# Patient Record
Sex: MALE | Race: OTHER | Employment: OTHER | URBAN - METROPOLITAN AREA
[De-identification: names, ages, dates, MRNs, and addresses within clinical notes are randomized per-mention and may not be internally consistent; named-entity substitution may affect disease eponyms.]

---

## 2017-02-15 ENCOUNTER — LAB ENCOUNTER (OUTPATIENT)
Dept: LAB | Facility: HOSPITAL | Age: 82
End: 2017-02-15
Attending: INTERNAL MEDICINE
Payer: MEDICARE

## 2017-02-15 DIAGNOSIS — I49.9 IRREGULAR HEART BEAT: Primary | ICD-10-CM

## 2017-02-15 PROCEDURE — 93010 ELECTROCARDIOGRAM REPORT: CPT | Performed by: INTERNAL MEDICINE

## 2017-02-15 PROCEDURE — 93005 ELECTROCARDIOGRAM TRACING: CPT

## 2017-03-02 ENCOUNTER — LAB ENCOUNTER (OUTPATIENT)
Dept: LAB | Facility: HOSPITAL | Age: 82
End: 2017-03-02
Attending: INTERNAL MEDICINE
Payer: MEDICARE

## 2017-03-02 ENCOUNTER — PRIOR ORIGINAL RECORDS (OUTPATIENT)
Dept: OTHER | Age: 82
End: 2017-03-02

## 2017-03-02 DIAGNOSIS — N39.0 UTI (URINARY TRACT INFECTION): ICD-10-CM

## 2017-03-02 DIAGNOSIS — I10 HYPERTENSION: ICD-10-CM

## 2017-03-02 DIAGNOSIS — E78.5 HYPERLIPIDEMIA: Primary | ICD-10-CM

## 2017-03-02 DIAGNOSIS — N40.0 PROSTATE ENLARGEMENT: ICD-10-CM

## 2017-03-02 LAB
ALBUMIN SERPL BCP-MCNC: 3.6 G/DL (ref 3.5–4.8)
ALBUMIN/GLOB SERPL: 1.2 {RATIO} (ref 1–2)
ALP SERPL-CCNC: 52 U/L (ref 32–100)
ALT SERPL-CCNC: 18 U/L (ref 17–63)
ANION GAP SERPL CALC-SCNC: 8 MMOL/L (ref 0–18)
AST SERPL-CCNC: 20 U/L (ref 15–41)
BILIRUB SERPL-MCNC: 1.1 MG/DL (ref 0.3–1.2)
BILIRUB UR QL: NEGATIVE
BUN SERPL-MCNC: 10 MG/DL (ref 8–20)
BUN/CREAT SERPL: 9.3 (ref 10–20)
CALCIUM SERPL-MCNC: 9.3 MG/DL (ref 8.5–10.5)
CHLORIDE SERPL-SCNC: 104 MMOL/L (ref 95–110)
CHOLEST SERPL-MCNC: 158 MG/DL (ref 110–200)
CLARITY UR: CLEAR
CO2 SERPL-SCNC: 29 MMOL/L (ref 22–32)
COLOR UR: YELLOW
CREAT SERPL-MCNC: 1.08 MG/DL (ref 0.5–1.5)
GLOBULIN PLAS-MCNC: 2.9 G/DL (ref 2.5–3.7)
GLUCOSE SERPL-MCNC: 92 MG/DL (ref 70–99)
GLUCOSE UR-MCNC: NEGATIVE MG/DL
HDLC SERPL-MCNC: 44 MG/DL
HGB UR QL STRIP.AUTO: NEGATIVE
KETONES UR-MCNC: NEGATIVE MG/DL
LDLC SERPL CALC-MCNC: 74 MG/DL (ref 0–99)
LEUKOCYTE ESTERASE UR QL STRIP.AUTO: NEGATIVE
NITRITE UR QL STRIP.AUTO: NEGATIVE
NONHDLC SERPL-MCNC: 114 MG/DL
OSMOLALITY UR CALC.SUM OF ELEC: 291 MOSM/KG (ref 275–295)
PH UR: 7 [PH] (ref 5–8)
POTASSIUM SERPL-SCNC: 4.1 MMOL/L (ref 3.3–5.1)
PROT SERPL-MCNC: 6.5 G/DL (ref 5.9–8.4)
PROT UR-MCNC: NEGATIVE MG/DL
PSA SERPL-MCNC: 4.1 NG/ML (ref 0–4)
SODIUM SERPL-SCNC: 141 MMOL/L (ref 136–144)
SP GR UR STRIP: 1.01 (ref 1–1.03)
TRIGL SERPL-MCNC: 202 MG/DL (ref 1–149)
UROBILINOGEN UR STRIP-ACNC: <2
VIT C UR-MCNC: NEGATIVE MG/DL

## 2017-03-02 PROCEDURE — 36415 COLL VENOUS BLD VENIPUNCTURE: CPT

## 2017-03-02 PROCEDURE — 84153 ASSAY OF PSA TOTAL: CPT

## 2017-03-02 PROCEDURE — 80061 LIPID PANEL: CPT

## 2017-03-02 PROCEDURE — 81003 URINALYSIS AUTO W/O SCOPE: CPT

## 2017-03-02 PROCEDURE — 80053 COMPREHEN METABOLIC PANEL: CPT

## 2017-03-17 ENCOUNTER — MYAURORA ACCOUNT LINK (OUTPATIENT)
Dept: OTHER | Age: 82
End: 2017-03-17

## 2017-03-21 ENCOUNTER — PRIOR ORIGINAL RECORDS (OUTPATIENT)
Dept: OTHER | Age: 82
End: 2017-03-21

## 2017-03-23 LAB
ALT (SGPT): 18 U/L
AST (SGOT): 20 U/L
BUN: 10 MG/DL
CHOLESTEROL, TOTAL: 158 MG/DL
CREATININE, SERUM: 1.08 MG/DL
GLUCOSE: 92 MG/DL
HDL CHOLESTEROL: 44 MG/DL
LDL CHOLESTEROL: 74 MG/DL
POTASSIUM, SERUM: 4.1 MEQ/L
SGOT (AST): 20 IU/L
SGPT (ALT): 18 IU/L
SODIUM: 141 MEQ/L
TRIGLYCERIDES: 202 MG/DL

## 2017-09-05 NOTE — PROGRESS NOTES
Neurology OutPsychiatrict Follow-up Note    Darius Mancini is a 80year old male. HPI:     Patient is being seen in follow-up for cognitive impairment. He is accompanied by his son to the visit today, who translates and helps provide history.     Patient w needed for Pain. Disp:  Rfl:    Vitamin D3 (VITAMIN D3) 2000 UNITS Oral Cap Take 2,000 Units by mouth daily. Disp:  Rfl:    Vitamin B-12 (VITAMIN B12) 1000 MCG Oral Tab Take 1,000 mcg by mouth daily.  Disp:  Rfl:        Allergies:    Pcn [Penicillins] 9/5/2018).     Christel Forrest MD

## 2018-01-15 ENCOUNTER — TELEPHONE (OUTPATIENT)
Dept: NEUROLOGY | Facility: CLINIC | Age: 83
End: 2018-01-15

## 2018-01-15 NOTE — TELEPHONE ENCOUNTER
Pts daughter would like to update Dr. Jens Montelongo with her concerns regarding pt but does not want to address them infront of pt because he might become hostile, would like to speak with someone before appt tomorrow

## 2018-01-15 NOTE — TELEPHONE ENCOUNTER
S/w daughter Lisa Wilkinson she has concerns regarding pt and is unable to address them in front of pt because he might become aggressive. Lisa Wilkinson stated MRI from 2015 was abnormal and demonstrated pt had dementia per Dr. Reena Galvan.  Pt was given Donepezil by PCP but d/c d

## 2018-01-16 NOTE — PATIENT INSTRUCTIONS
Caregiver Resources:    [facility] ([facility] [place]):     of Senior Services: Britt Agosto   Phone:   [phone] or  [phone]  Fax:   [phone]  TDD:   [phone]  Office Hours:   8 a.m. - 4:30 p.m.   Monday -

## 2018-01-22 NOTE — PROGRESS NOTES
[facility]    Dwight Najera is a 80year old male. HPI:     Patient is being seen in follow-up. He is accompanied by his daughter to the visit today. I saw him in clinic last 9/8/2017.     Daughter feels that he is functiona EXTRA STRENGTH) 500 MG Oral Tab Take 500 mg by mouth every 6 (six) hours as needed for Pain. Disp:  Rfl:    Vitamin D3 (VITAMIN D3) 2000 UNITS Oral Cap Take 2,000 Units by mouth daily.  Disp:  Rfl:    Vitamin B-12 (VITAMIN B12) 1000 MCG Oral Tab Take 1,000 active    –I did discuss with daughter caregiver issues and provided her with additional caregiver resources (see after visit summary); we also discussed prognosis, and advanced care planning         Return in about 3 months (around 4/16/2018).     Tommy Chakraborty

## 2018-01-27 ENCOUNTER — PATIENT MESSAGE (OUTPATIENT)
Dept: NEUROLOGY | Facility: CLINIC | Age: 83
End: 2018-01-27

## 2018-01-29 NOTE — TELEPHONE ENCOUNTER
From: Caroline Zhou  To: Santos Zarate MD  Sent: 1/27/2018 2:52 PM CST  Subject: Non-Urgent Medical Question    Doctor:  Dr. Greta Cesar has in my father's chart that he has vascular dementia, do you have any tests that will tell me if he has dementia?   Thank

## 2018-03-05 ENCOUNTER — LAB ENCOUNTER (OUTPATIENT)
Dept: LAB | Facility: HOSPITAL | Age: 83
End: 2018-03-05
Attending: INTERNAL MEDICINE
Payer: MEDICARE

## 2018-03-05 DIAGNOSIS — E78.5 HYPERLIPIDEMIA: Primary | ICD-10-CM

## 2018-03-05 LAB
ALBUMIN SERPL BCP-MCNC: 3.7 G/DL (ref 3.5–4.8)
ALBUMIN/GLOB SERPL: 1.2 {RATIO} (ref 1–2)
ALP SERPL-CCNC: 46 U/L (ref 32–100)
ALT SERPL-CCNC: 17 U/L (ref 17–63)
ANION GAP SERPL CALC-SCNC: 6 MMOL/L (ref 0–18)
AST SERPL-CCNC: 20 U/L (ref 15–41)
BILIRUB SERPL-MCNC: 1.1 MG/DL (ref 0.3–1.2)
BUN SERPL-MCNC: 11 MG/DL (ref 8–20)
BUN/CREAT SERPL: 10.1 (ref 10–20)
CALCIUM SERPL-MCNC: 9.1 MG/DL (ref 8.5–10.5)
CHLORIDE SERPL-SCNC: 103 MMOL/L (ref 95–110)
CHOLEST SERPL-MCNC: 172 MG/DL (ref 110–200)
CO2 SERPL-SCNC: 29 MMOL/L (ref 22–32)
CREAT SERPL-MCNC: 1.09 MG/DL (ref 0.5–1.5)
GLOBULIN PLAS-MCNC: 3.1 G/DL (ref 2.5–3.7)
GLUCOSE SERPL-MCNC: 97 MG/DL (ref 70–99)
HDLC SERPL-MCNC: 61 MG/DL
LDLC SERPL CALC-MCNC: 78 MG/DL (ref 0–99)
NONHDLC SERPL-MCNC: 111 MG/DL
OSMOLALITY UR CALC.SUM OF ELEC: 285 MOSM/KG (ref 275–295)
PATIENT FASTING: YES
POTASSIUM SERPL-SCNC: 3.6 MMOL/L (ref 3.3–5.1)
PROT SERPL-MCNC: 6.8 G/DL (ref 5.9–8.4)
SODIUM SERPL-SCNC: 138 MMOL/L (ref 136–144)
TRIGL SERPL-MCNC: 164 MG/DL (ref 1–149)

## 2018-03-05 PROCEDURE — 80053 COMPREHEN METABOLIC PANEL: CPT

## 2018-03-05 PROCEDURE — 36415 COLL VENOUS BLD VENIPUNCTURE: CPT

## 2018-03-05 PROCEDURE — 80061 LIPID PANEL: CPT

## 2018-03-20 ENCOUNTER — TELEPHONE (OUTPATIENT)
Dept: NEUROLOGY | Facility: CLINIC | Age: 83
End: 2018-03-20

## 2018-03-20 NOTE — TELEPHONE ENCOUNTER
Received call from [phone] at [facility]; she inquired regarding patient's decision-making capacity with regards to financial and other complex decisions; I advised based on my last evaluation of him in the clinic, and MMSE score, he has at least moderate dementi

## 2018-04-04 NOTE — PATIENT INSTRUCTIONS
Caregiver Resources:    [facility] ([facility] [place]):     of Senior Services: Britt Agosto   Phone:   [phone] or  [phone]  Fax:   [phone]  TDD:   [phone]  Office Hours:   8 a.m. - 4:30 p.m.   Monday - Vehicles. Watch for wandering  People with dementia may wander away from the house and get lost. To keep your loved one safer, try these tips:  · Have your loved one wear an ID bracelet at all times.  You can also enroll your loved one in the Alzheimer’s A stoves and other appliances. · Check food for spoilage. Your loved one may not know when food has gone bad. Other areas of the home  Suggested safety steps include:  · Lock up hazardous substances, such as bleach, pesticides, and paint thinners.   · Keep healthcare team will meet with you to discuss your loved one’s treatment plan. Understanding end-stage dementia  Symptoms are different for each person. But in general, dementia has 3 basic stages. Each stage can last a few months to years.  End-stage dem you and others involved in your loved one’s care may want to ask:  · How much longer does our loved one have to live? · How can symptoms be managed at this time? · What treatments might be helpful? · What are the risks and benefits of these treatments? healthcare team can help address your questions and concerns. You can also seek advice from a , , or . Getting support  Coping with your loved one’s condition can wear you down over time.  Grief, anger, fear, and worry you trust. This could be a counselor, , , or therapist. Another good option is joining a local support group for caregivers. Joining a support group can help you feel that you aren’t alone.  It’s also reassuring to share thoughts a intended as a substitute for professional medical care. Always follow your healthcare professional's instructions.

## 2018-04-06 NOTE — PROGRESS NOTES
[facility]    Veronica Garcia is a 80year old male. HPI:     Patient is being seen in follow-up. He is accompanied by his daughter to the visit today. I saw him last in January of this year.     I spoke with daughter for a pe simvastatin (ZOCOR) 20 MG Oral Tab 1 tablet daily. Disp:  Rfl: 2   tamsulosin HCl (FLOMAX) 0.4 MG Oral Cap 1 capsule daily. Disp:  Rfl: 2   finasteride (PROSCAR) 5 MG Oral Tab Take 5 mg by mouth daily.  Disp:  Rfl:    acetaminophen (TYLENOL EXTRA STRENGTH to a full tablet as needed for periods of agitation; we did also discuss patient and caregiver safety issues; daughter feels very strongly about continuing to care for patient in the home; we discussed the importance of strict 24/7 care and that patient sh

## 2018-04-09 ENCOUNTER — TELEPHONE (OUTPATIENT)
Dept: NEUROLOGY | Facility: CLINIC | Age: 83
End: 2018-04-09

## 2018-04-09 NOTE — TELEPHONE ENCOUNTER
S/w Claudia again at [facility]; she inquired about letter to support patient having son Janay Mckeon and/or daughter Branden Boone to be appointed POA (medical and financial); I agree this would be appropriate, letter drafted to be faxed to [phone].

## 2018-07-10 NOTE — PROGRESS NOTES
[facility]    Javed Almeida is a 80year old male. HPI:     Patient is being seen in follow-up. He is accompanied by his daughter to the visit today. I saw him last in April of this year.     Since last visit, patient has be acetaminophen (TYLENOL EXTRA STRENGTH) 500 MG Oral Tab Take 500 mg by mouth every 6 (six) hours as needed for Pain. Disp:  Rfl:    Vitamin D3 (VITAMIN D3) 2000 UNITS Oral Cap Take 2,000 Units by mouth daily.  Disp:  Rfl:    Vitamin B-12 (VITAMIN B12) 1000

## 2018-08-06 ENCOUNTER — OFFICE VISIT (OUTPATIENT)
Dept: SPEECH THERAPY | Facility: HOSPITAL | Age: 83
End: 2018-08-06
Attending: Other
Payer: MEDICARE

## 2018-08-06 PROCEDURE — 92610 EVALUATE SWALLOWING FUNCTION: CPT

## 2018-08-06 NOTE — PATIENT INSTRUCTIONS
LIQUID CONSISTENCIES    Liquids are generally classified into three groups: honey, nectar and thin consistency liquids.   Thin liquids can be the most difficult consistency for people with dysphagia or swallowing problems to control, especially if there pre-mixed beverages   : [address]   www.[employer]   [phone]    3. Thick It/ Thick It 2   : . com   [phone]    Most pharmacies, especially [facility]

## 2018-08-07 ENCOUNTER — APPOINTMENT (OUTPATIENT)
Dept: SPEECH THERAPY | Facility: HOSPITAL | Age: 83
End: 2018-08-07
Attending: Other
Payer: MEDICARE

## 2018-08-07 ENCOUNTER — TELEPHONE (OUTPATIENT)
Dept: NEUROLOGY | Facility: CLINIC | Age: 83
End: 2018-08-07

## 2018-08-07 NOTE — TELEPHONE ENCOUNTER
Notified Nida Suazo in outpatient rehab that order was placed. She was thankful for the return call.

## 2018-08-08 ENCOUNTER — OFFICE VISIT (OUTPATIENT)
Dept: SPEECH THERAPY | Facility: HOSPITAL | Age: 83
End: 2018-08-08
Attending: Other
Payer: MEDICARE

## 2018-08-08 PROCEDURE — 92526 ORAL FUNCTION THERAPY: CPT

## 2018-08-08 NOTE — PROGRESS NOTES
Diagnosis: Oropharyngeal Dysphagia   Authorized # of Visits:  12       Next MD visit: none scheduled  Fall Risk: standard         Precautions: n/a           Medication Changes since last visit?: No  Subjective:   Pt arrived to SLP session with his daughter changes in status. Goals:   1. The patient will tolerate a pureed diet and nectar thickened liquids with no clinical signs of aspiration for 100% of meal across 2 sessions.    2. The patient will use safe swallowing compensatory strategies (i.e., No s

## 2018-08-08 NOTE — PATIENT INSTRUCTIONS
1. Please thicken all liquids to NECTAR consistency   2. Please complete dysphagia exercises provided.          ADDUCTION EXERCISES      • Push your hands together and say [facility]    • Push your hands together and say “AH – AH – AH”     • Push your hands toget

## 2018-08-15 ENCOUNTER — APPOINTMENT (OUTPATIENT)
Dept: SPEECH THERAPY | Facility: HOSPITAL | Age: 83
End: 2018-08-15
Attending: Other
Payer: MEDICARE

## 2018-08-16 ENCOUNTER — OFFICE VISIT (OUTPATIENT)
Dept: SPEECH THERAPY | Facility: HOSPITAL | Age: 83
End: 2018-08-16
Attending: Other
Payer: MEDICARE

## 2018-08-16 PROCEDURE — 92526 ORAL FUNCTION THERAPY: CPT

## 2018-08-16 NOTE — PATIENT INSTRUCTIONS
1. Please continue the dysphagia exercises   2. Please continue to adhere to the recommended diet consistencies. 3.  VFSS on 8/23/18       Navjot Castle  Speech Language Pathologist   Phone Number [phone]

## 2018-08-16 NOTE — PROGRESS NOTES
Diagnosis: Oropharyngeal Dysphagia   Authorized # of Visits:  12       Next MD visit: none scheduled  Fall Risk: standard         Precautions: n/a           Medication Changes since last visit?: No  Subjective:   Pt arrived to SLP session with his daughter recipe with pt's daughter. Daughter thickened to appropriate consistency. Thickener reviewed. Pt's daughter bought thickener. VFSS  Scheduled on 8/23/18  Scheduled   On 8/23/18            Assessment:   STG #1 and STG #3 Met.    Pt positioned upright

## 2018-08-17 ENCOUNTER — APPOINTMENT (OUTPATIENT)
Dept: SPEECH THERAPY | Facility: HOSPITAL | Age: 83
End: 2018-08-17
Attending: Other
Payer: MEDICARE

## 2018-08-23 ENCOUNTER — HOSPITAL ENCOUNTER (OUTPATIENT)
Dept: GENERAL RADIOLOGY | Facility: HOSPITAL | Age: 83
Discharge: HOME | End: 2018-08-23
Attending: Other
Payer: MEDICARE

## 2018-08-23 DIAGNOSIS — F03.91 DEMENTIA WITH BEHAVIORAL DISTURBANCE, UNSPECIFIED DEMENTIA TYPE (HCC): ICD-10-CM

## 2018-08-23 DIAGNOSIS — R13.11 ORAL PHASE DYSPHAGIA: ICD-10-CM

## 2018-08-23 PROCEDURE — 92611 MOTION FLUOROSCOPY/SWALLOW: CPT

## 2018-08-23 PROCEDURE — 74230 X-RAY XM SWLNG FUNCJ C+: CPT | Performed by: OTHER

## 2018-08-23 NOTE — PATIENT INSTRUCTIONS
SWALLOWING INSTRUCTIONS    DIET: Pureed diet/Thin liquids NO STRAWS       ____ SIT UPRIGHT    ____ SMALL BITES AND SIPS    ____ EAT SLOWLY    ____ ALTERNATED LIQUIDS AND SOLIDS    ____ Yoly Delgado WITH EACH BITE    ____ NO STRAW        PLEASE CALL THE

## 2018-08-23 NOTE — PROGRESS NOTES
ADULT VIDEOFLUOROSCOPIC SWALLOWING STUDY    Referring Physician: Dr. Yony Sena  Diagnosis: Oropharyngeal Dysphagia    Date of Service: 8/23/2018   Radiologist: Dr. Amanda Stanford     Dear Dr. Yony Sena,    This letter is to inform you of [facility] Videofluoroscopic S A-P transit. Regular solids were attempted under fluoro; however, pt with no bite reflex and only able to scrape the barium off the cracker presented. Pharyngeal phase:  Impaired.  Pt presents with a delayed swallow response with swallows triggered at straw    Medication Administration:  Take pills one at a time    Further Follow-up:  No further follow up required     EDUCATION/INSTRUCTION  Reviewed results and recommendations with patient and family.   Agreement/Understanding verbalized and all question

## 2018-08-24 ENCOUNTER — APPOINTMENT (OUTPATIENT)
Dept: SPEECH THERAPY | Facility: HOSPITAL | Age: 83
End: 2018-08-24
Attending: Other
Payer: MEDICARE

## 2018-08-28 ENCOUNTER — APPOINTMENT (OUTPATIENT)
Dept: SPEECH THERAPY | Facility: HOSPITAL | Age: 83
End: 2018-08-28
Attending: Other
Payer: MEDICARE

## 2018-08-29 ENCOUNTER — APPOINTMENT (OUTPATIENT)
Dept: SPEECH THERAPY | Facility: HOSPITAL | Age: 83
End: 2018-08-29
Attending: Other
Payer: MEDICARE

## 2018-08-31 ENCOUNTER — APPOINTMENT (OUTPATIENT)
Dept: SPEECH THERAPY | Facility: HOSPITAL | Age: 83
End: 2018-08-31
Attending: Other
Payer: MEDICARE

## 2018-09-04 ENCOUNTER — APPOINTMENT (OUTPATIENT)
Dept: SPEECH THERAPY | Facility: HOSPITAL | Age: 83
End: 2018-09-04
Attending: Other
Payer: MEDICARE

## 2018-09-07 ENCOUNTER — APPOINTMENT (OUTPATIENT)
Dept: SPEECH THERAPY | Facility: HOSPITAL | Age: 83
End: 2018-09-07
Attending: Other
Payer: MEDICARE

## 2018-09-11 ENCOUNTER — APPOINTMENT (OUTPATIENT)
Dept: SPEECH THERAPY | Facility: HOSPITAL | Age: 83
End: 2018-09-11
Attending: Other
Payer: MEDICARE

## 2018-09-14 ENCOUNTER — APPOINTMENT (OUTPATIENT)
Dept: SPEECH THERAPY | Facility: HOSPITAL | Age: 83
End: 2018-09-14
Attending: Other
Payer: MEDICARE

## 2018-09-14 ENCOUNTER — TELEPHONE (OUTPATIENT)
Dept: NEUROLOGY | Facility: CLINIC | Age: 83
End: 2018-09-14

## 2018-09-18 ENCOUNTER — APPOINTMENT (OUTPATIENT)
Dept: SPEECH THERAPY | Facility: HOSPITAL | Age: 83
End: 2018-09-18
Attending: Other
Payer: MEDICARE

## 2018-09-18 ENCOUNTER — OFFICE VISIT (OUTPATIENT)
Dept: PODIATRY CLINIC | Facility: CLINIC | Age: 83
End: 2018-09-18
Payer: MEDICARE

## 2018-09-18 DIAGNOSIS — M79.674 PAIN IN TOES OF BOTH FEET: Primary | ICD-10-CM

## 2018-09-18 DIAGNOSIS — M79.675 PAIN IN TOES OF BOTH FEET: Primary | ICD-10-CM

## 2018-09-18 DIAGNOSIS — B35.1 ONYCHOMYCOSIS: ICD-10-CM

## 2018-09-18 PROCEDURE — 11721 DEBRIDE NAIL 6 OR MORE: CPT | Performed by: PODIATRIST

## 2018-09-18 PROCEDURE — 99202 OFFICE O/P NEW SF 15 MIN: CPT | Performed by: PODIATRIST

## 2018-09-18 NOTE — PROGRESS NOTES
HPI:    Patient ID: Caroline Zhou is a 80year old male. HPI  This 71-year-old male presents for care associated with his painful toenails. He is accompanied by family who relates a difficult time caring for him on a regular basis.   Review of Systems mycosis. There is no evidence of infection and he has no other foot related concerns. I discussed with his family all 4 options of care in reference to fungus. His only present option is periodic debridement. On this visit I reduced all 10 toenails.   I

## 2018-09-21 ENCOUNTER — APPOINTMENT (OUTPATIENT)
Dept: SPEECH THERAPY | Facility: HOSPITAL | Age: 83
End: 2018-09-21
Attending: Other
Payer: MEDICARE

## 2018-10-08 RX ORDER — MEMANTINE HYDROCHLORIDE 5 MG/1
TABLET ORAL
Qty: 60 TABLET | Refills: 2 | Status: SHIPPED | OUTPATIENT
Start: 2018-10-08 | End: 2018-10-17

## 2018-10-17 NOTE — PROGRESS NOTES
[facility]    Noreen Nj is a 80year old male. HPI:     Patient is being seen in follow-up. He is accompanied by his daughter to the visit today. I saw him last in July of this year.     Since last visit, he has been doi finasteride (PROSCAR) 5 MG Oral Tab Take 5 mg by mouth daily. Disp:  Rfl:    acetaminophen (TYLENOL EXTRA STRENGTH) 500 MG Oral Tab Take 500 mg by mouth every 6 (six) hours as needed for Pain.  Disp:  Rfl:    Vitamin D3 (VITAMIN D3) 2000 UNITS Oral Cap Ta 10 mg twice daily       Follow-up in clinic in 6 months, sooner as needed    Suzie Norman MD

## 2018-10-17 NOTE — PATIENT INSTRUCTIONS
We will increase the memantine (Namenda) in the following way:    5 mg in the morning and 10 mg in the evening x 1 week, then 10 mg twice daily

## 2018-11-03 NOTE — TELEPHONE ENCOUNTER
Refill request for seroquel 25 mg, take 1-2 tabs nightly as needed, #60, 5 refills    LOV: 10/17/18  NOV: 4/24/18

## 2018-11-05 RX ORDER — QUETIAPINE 25 MG/1
TABLET, FILM COATED ORAL
Qty: 60 TABLET | Refills: 5 | Status: SHIPPED | OUTPATIENT
Start: 2018-11-05 | End: 2019-04-24

## 2018-11-08 ENCOUNTER — TELEPHONE (OUTPATIENT)
Dept: NEUROLOGY | Facility: CLINIC | Age: 83
End: 2018-11-08

## 2018-12-19 ENCOUNTER — OFFICE VISIT (OUTPATIENT)
Dept: PODIATRY CLINIC | Facility: CLINIC | Age: 83
End: 2018-12-19
Payer: MEDICARE

## 2018-12-19 DIAGNOSIS — M79.674 PAIN IN TOES OF BOTH FEET: Primary | ICD-10-CM

## 2018-12-19 DIAGNOSIS — B35.1 ONYCHOMYCOSIS: ICD-10-CM

## 2018-12-19 DIAGNOSIS — M79.675 PAIN IN TOES OF BOTH FEET: Primary | ICD-10-CM

## 2018-12-19 PROCEDURE — 11721 DEBRIDE NAIL 6 OR MORE: CPT | Performed by: PODIATRIST

## 2018-12-19 NOTE — PROGRESS NOTES
HPI:    Patient ID: Reena Maldonado is a 80year old male. This pleasant 80-year-old male presents with family for care associated with his painful nails. He reports relief by previous treatment.       ROS:     I did review medical status, medications were nail, subungual debris, and some keratosis. Upon debridement there is no ulceration or infection.   I anticipate relief will see patient for care if and when symptoms redevelop         ASSESSMENT/PLAN:   Pain in toes of both feet  (primary encounter diagno

## 2019-03-01 VITALS
HEIGHT: 65 IN | WEIGHT: 188 LBS | BODY MASS INDEX: 31.32 KG/M2 | DIASTOLIC BLOOD PRESSURE: 64 MMHG | SYSTOLIC BLOOD PRESSURE: 116 MMHG | HEART RATE: 74 BPM

## 2019-03-08 ENCOUNTER — LAB ENCOUNTER (OUTPATIENT)
Dept: LAB | Facility: HOSPITAL | Age: 84
End: 2019-03-08
Attending: INTERNAL MEDICINE
Payer: MEDICARE

## 2019-03-08 DIAGNOSIS — I10 HTN (HYPERTENSION): ICD-10-CM

## 2019-03-08 DIAGNOSIS — E78.5 HYPERLIPIDEMIA: ICD-10-CM

## 2019-03-08 DIAGNOSIS — R63.4 WEIGHT LOSS: ICD-10-CM

## 2019-03-08 DIAGNOSIS — N40.0 BPH (BENIGN PROSTATIC HYPERPLASIA): Primary | ICD-10-CM

## 2019-03-08 LAB
ALBUMIN SERPL-MCNC: 3.2 G/DL (ref 3.4–5)
ALBUMIN/GLOB SERPL: 0.9 {RATIO} (ref 1–2)
ALP LIVER SERPL-CCNC: 77 U/L (ref 45–117)
ALT SERPL-CCNC: 20 U/L (ref 16–61)
ANION GAP SERPL CALC-SCNC: 3 MMOL/L (ref 0–18)
AST SERPL-CCNC: 16 U/L (ref 15–37)
BILIRUB SERPL-MCNC: 0.5 MG/DL (ref 0.1–2)
BUN BLD-MCNC: 15 MG/DL (ref 7–18)
BUN/CREAT SERPL: 14.2 (ref 10–20)
CALCIUM BLD-MCNC: 8.8 MG/DL (ref 8.5–10.1)
CHLORIDE SERPL-SCNC: 109 MMOL/L (ref 98–107)
CHOLEST SMN-MCNC: 137 MG/DL (ref ?–200)
CO2 SERPL-SCNC: 31 MMOL/L (ref 21–32)
CREAT BLD-MCNC: 1.06 MG/DL (ref 0.7–1.3)
GLOBULIN PLAS-MCNC: 3.4 G/DL (ref 2.8–4.4)
GLUCOSE BLD-MCNC: 88 MG/DL (ref 70–99)
HDLC SERPL-MCNC: 47 MG/DL (ref 40–59)
LDLC SERPL CALC-MCNC: 52 MG/DL (ref ?–100)
M PROTEIN MFR SERPL ELPH: 6.6 G/DL (ref 6.4–8.2)
NONHDLC SERPL-MCNC: 90 MG/DL (ref ?–130)
OSMOLALITY SERPL CALC.SUM OF ELEC: 296 MOSM/KG (ref 275–295)
POTASSIUM SERPL-SCNC: 4 MMOL/L (ref 3.5–5.1)
PSA SERPL-MCNC: 3.11 NG/ML (ref ?–4)
SODIUM SERPL-SCNC: 143 MMOL/L (ref 136–145)
TRIGL SERPL-MCNC: 190 MG/DL (ref 30–149)
VLDLC SERPL CALC-MCNC: 38 MG/DL (ref 0–30)

## 2019-03-08 PROCEDURE — 80061 LIPID PANEL: CPT

## 2019-03-08 PROCEDURE — 36415 COLL VENOUS BLD VENIPUNCTURE: CPT

## 2019-03-08 PROCEDURE — 84153 ASSAY OF PSA TOTAL: CPT

## 2019-03-08 PROCEDURE — 80053 COMPREHEN METABOLIC PANEL: CPT

## 2019-03-25 ENCOUNTER — TELEPHONE (OUTPATIENT)
Dept: NEUROLOGY | Facility: CLINIC | Age: 84
End: 2019-03-25

## 2019-03-25 NOTE — TELEPHONE ENCOUNTER
[facility] for memantine and quetapine fax received. Placed in 's bin for review.

## 2019-03-28 NOTE — TELEPHONE ENCOUNTER
[facility] drug utilization review for memantine and quetapine fax response reviewed, will fax back.

## 2019-04-22 ENCOUNTER — MED REC SCAN ONLY (OUTPATIENT)
Dept: NEUROLOGY | Facility: CLINIC | Age: 84
End: 2019-04-22

## 2019-04-22 RX ORDER — MEMANTINE HYDROCHLORIDE 10 MG/1
TABLET ORAL
Qty: 180 TABLET | Refills: 3 | Status: SHIPPED | OUTPATIENT
Start: 2019-04-22 | End: 2019-10-28

## 2019-04-24 NOTE — PROGRESS NOTES
[facility]    Lisa Lyons is a 80year old male. HPI:     Patient is being seen in follow-up. He is accompanied by his daughter to the visit today. I saw him in clinic last in October 2018.     Since last visit, his memory mcg by mouth daily.  Disp:  Rfl:        Allergies:    Pcn [Penicillins]       NAUSEA AND VOMITING      ROS:   GENERAL: no weight loss or fevers  RESPIRATORY: denies shortness of breath, wheezing or cough   PSYCH: see HPI  NEURO: see HPI      PHYSICAL EXAM:

## 2019-10-28 NOTE — PROGRESS NOTES
[facility]    Allyson Bond is a 80year old male. HPI:     Patient is being seen in follow-up. He is accompanied by his daughter to the visit today. I saw him in clinic last in April.     Since last visit, his dementia has daily., Disp: , Rfl:   Vitamin B-12 (VITAMIN B12) 1000 MCG Oral Tab, Take 1,000 mcg by mouth daily. , Disp: , Rfl:         Allergies:    Pcn [Penicillins]       NAUSEA AND VOMITING      ROS:   GENERAL: see HPI  RESPIRATORY: denies shortness of breath, wheez

## 2019-11-25 ENCOUNTER — OFFICE VISIT (OUTPATIENT)
Dept: HEMATOLOGY/ONCOLOGY | Facility: HOSPITAL | Age: 84
End: 2019-11-25
Attending: NURSE PRACTITIONER
Payer: MEDICARE

## 2019-11-25 VITALS
WEIGHT: 169 LBS | OXYGEN SATURATION: 97 % | HEART RATE: 84 BPM | RESPIRATION RATE: 18 BRPM | SYSTOLIC BLOOD PRESSURE: 129 MMHG | DIASTOLIC BLOOD PRESSURE: 95 MMHG | BODY MASS INDEX: 25.61 KG/M2 | HEIGHT: 68 IN | TEMPERATURE: 99 F

## 2019-11-25 DIAGNOSIS — F03.91 DEMENTIA WITH BEHAVIORAL DISTURBANCE, UNSPECIFIED DEMENTIA TYPE (HCC): ICD-10-CM

## 2019-11-25 DIAGNOSIS — R63.0 DECREASED APPETITE: Primary | ICD-10-CM

## 2019-11-25 DIAGNOSIS — Z71.89 GOALS OF CARE, COUNSELING/DISCUSSION: ICD-10-CM

## 2019-11-25 DIAGNOSIS — Z71.89 ADVANCE CARE PLANNING: ICD-10-CM

## 2019-11-25 PROCEDURE — 99203 OFFICE O/P NEW LOW 30 MIN: CPT | Performed by: NURSE PRACTITIONER

## 2019-11-25 NOTE — CONSULTS
Palliative Care Consult Note     Patient Name: Frieda Godoy   YOB: 1931   Medical Record Number: [id number]   Date of visit: 11/25/2019     Chief Complaint/Reason for Visit:  Patient presents with:  Palliative Care    Reason for Consultatio Dermatochalasis of both eyelids     OU   • Elevated PSA 2000    Negative biopsies   • Elevated PSA 2003    and free PSA at 19%-Negative biopsies   • Floaters 2008    OD   • Foot drop 2008    w/ weakness   • Lipid screening 3/15/2008    per: NG   • Other an Alcoholics Anonymous groups in [place]. He still likes to attend these meetings to this day. Lindsey Guerrero does not have a current or past history involving illicit drugs. Functional status:  Lindsey Guerrero required moderate assistance with most ADLs.  Lindsey Guerrero has a Margurite Dys at bedtime; can give additional tablet as needed for agitation 180 tablet 4   • Memantine HCl 10 MG Oral Tab Take 1 tablet (10 mg total) by mouth 2 (two) times daily.  180 tablet 4   • triamcinolone acetonide 0.1 % External Cream Apply topically 2 (two) errol Neck:      Musculoskeletal: Normal range of motion and neck supple. Cardiovascular:      Rate and Rhythm: Normal rate and regular rhythm. Pulses: Normal pulses. Heart sounds: Normal heart sounds. No murmur.    Pulmonary:      Effort: Pulmonary continue pursuing cancer treatment  -Provided emotional support to pt/family who appear to be coping adequately  -See above narrative for further details      Advance care planning counseling/discussion  -Pt is FULL CODE  -Daughter provided Living Will and

## 2020-01-14 ENCOUNTER — TELEPHONE (OUTPATIENT)
Dept: HEMATOLOGY/ONCOLOGY | Facility: HOSPITAL | Age: 85
End: 2020-01-14

## 2020-01-15 ENCOUNTER — APPOINTMENT (OUTPATIENT)
Dept: HEMATOLOGY/ONCOLOGY | Facility: HOSPITAL | Age: 85
End: 2020-01-15
Attending: NURSE PRACTITIONER
Payer: MEDICARE

## 2020-01-23 ENCOUNTER — DOCUMENTATION ONLY (OUTPATIENT)
Dept: HEMATOLOGY/ONCOLOGY | Facility: HOSPITAL | Age: 85
End: 2020-01-23

## 2020-01-23 ENCOUNTER — APPOINTMENT (OUTPATIENT)
Dept: HEMATOLOGY/ONCOLOGY | Facility: HOSPITAL | Age: 85
End: 2020-01-23
Attending: NURSE PRACTITIONER
Payer: MEDICARE

## 2020-01-23 NOTE — PROGRESS NOTES
I met with Abelwhit Boonebertram today (Audie's daughter/HCPOA). Katy Warren is not present today. Ashley Iqbal thought that since Carlos Dougherty during his consult appointment, he did not have to come with her to any more palliative care follow up appointments.  I explained to Ashley Iqbal

## 2020-04-14 ENCOUNTER — TELEPHONE (OUTPATIENT)
Dept: NEUROLOGY | Facility: CLINIC | Age: 85
End: 2020-04-14

## 2020-04-14 NOTE — TELEPHONE ENCOUNTER
Spoke to daughter Joyce Waggoner verified. She states she is getting a new phone and will then call back to schedule a video visit. Should have phone by end of the week.

## 2020-08-18 ENCOUNTER — TELEPHONE (OUTPATIENT)
Dept: NEUROLOGY | Facility: CLINIC | Age: 85
End: 2020-08-18

## 2020-08-21 RX ORDER — QUETIAPINE 25 MG/1
TABLET, FILM COATED ORAL
Qty: 180 TABLET | Refills: 0 | Status: SHIPPED | OUTPATIENT
Start: 2020-08-21 | End: 2021-02-09

## 2021-01-07 ENCOUNTER — MED REC SCAN ONLY (OUTPATIENT)
Dept: INTERNAL MEDICINE CLINIC | Facility: CLINIC | Age: 86
End: 2021-01-07

## 2021-01-07 ENCOUNTER — OFFICE VISIT (OUTPATIENT)
Dept: INTERNAL MEDICINE CLINIC | Facility: CLINIC | Age: 86
End: 2021-01-07
Payer: COMMERCIAL

## 2021-01-07 VITALS
BODY MASS INDEX: 29.65 KG/M2 | HEIGHT: 68 IN | OXYGEN SATURATION: 98 % | RESPIRATION RATE: 19 BRPM | DIASTOLIC BLOOD PRESSURE: 70 MMHG | HEART RATE: 75 BPM | TEMPERATURE: 98 F | WEIGHT: 195.63 LBS | SYSTOLIC BLOOD PRESSURE: 134 MMHG

## 2021-01-07 DIAGNOSIS — E78.2 MIXED HYPERLIPIDEMIA: ICD-10-CM

## 2021-01-07 DIAGNOSIS — Z00.00 ENCOUNTER FOR ANNUAL HEALTH EXAMINATION: ICD-10-CM

## 2021-01-07 DIAGNOSIS — Z00.00 WELLNESS EXAMINATION: ICD-10-CM

## 2021-01-07 DIAGNOSIS — E03.9 ACQUIRED HYPOTHYROIDISM: Primary | ICD-10-CM

## 2021-01-07 DIAGNOSIS — I10 ESSENTIAL HYPERTENSION, BENIGN: ICD-10-CM

## 2021-01-07 DIAGNOSIS — Z00.00 ADULT GENERAL MEDICAL EXAM: ICD-10-CM

## 2021-01-07 LAB
APPEARANCE: CLEAR
MULTISTIX LOT#: 1044 NUMERIC
PH, URINE: 6 (ref 4.5–8)
SPECIFIC GRAVITY: 1.01 (ref 1–1.03)
URINE-COLOR: YELLOW
UROBILINOGEN,SEMI-QN: 1 MG/DL (ref 0–1.9)

## 2021-01-07 PROCEDURE — 3075F SYST BP GE 130 - 139MM HG: CPT | Performed by: INTERNAL MEDICINE

## 2021-01-07 PROCEDURE — 99387 INIT PM E/M NEW PAT 65+ YRS: CPT | Performed by: INTERNAL MEDICINE

## 2021-01-07 PROCEDURE — 3008F BODY MASS INDEX DOCD: CPT | Performed by: INTERNAL MEDICINE

## 2021-01-07 PROCEDURE — 81003 URINALYSIS AUTO W/O SCOPE: CPT | Performed by: INTERNAL MEDICINE

## 2021-01-07 PROCEDURE — 96160 PT-FOCUSED HLTH RISK ASSMT: CPT | Performed by: INTERNAL MEDICINE

## 2021-01-07 PROCEDURE — 3078F DIAST BP <80 MM HG: CPT | Performed by: INTERNAL MEDICINE

## 2021-01-07 PROCEDURE — G0439 PPPS, SUBSEQ VISIT: HCPCS | Performed by: INTERNAL MEDICINE

## 2021-01-07 NOTE — PATIENT INSTRUCTIONS
ASSESSMENT AND PLAN:   Dylon Esquivel was seen today for wellness visit. Diagnoses and all orders for this visit:    Acquired hypothyroidism  -     TSH W REFLEX TO FREE T4; Future  -     FREE T4 (FREE THYROXINE);  Future    Mixed hyperlipidemia  -     LIPID PANEL; 6-month intervals for prediabetic patients        Cardiovascular Disease Screening     Cholesterol, covered every 5 yrs including Total, LDL and Trigs LDL Cholesterol (mg/dL)   Date Value   03/08/2019 52     Cholesterol, Total (mg/dL)   Date Value   03/08/ Influenza  Covered Annually No orders found for this or any previous visit. Please get every year    Pneumococcal 13 (Prevnar)  Covered Once after 65 No orders found for this or any previous visit.  Please get once after your 65th birthday    Pneumococcal 2

## 2021-01-07 NOTE — PROGRESS NOTES
HPI:    Patient ID: Doug Triplett is a 80year old male. Doug Triplett is a 80year old male who presents for a complete physical exam.  New to me. First visit with the clinic here.   HPI:       Wt Readings from Last 3 Encounters:  01/07/21 : 195 lb 9.6 [facility])    • Dermatochalasis of both eyelids     OU   • Elevated PSA 2000    Negative biopsies   • Elevated PSA 2003    and free PSA at 19%-Negative biopsies   • Floaters 2008    OD   • Foot drop 2008    w/ weakness   • Lipid screening 3/15/2008    per: NG Hypertension  Patient is here for follow up of hypertension. BP at home: not check. Has been compliant with medications. Exercise level: not active and has been following low salt diet. Weight has been stable.   Wt Readings from Last 3 Encounters: Frequency   This is a chronic problem. The current episode started more than 1 year ago (On 2 new med. Getting worse. ). The problem occurs every urination. The pain is at a severity of 0/10. The patient is experiencing no pain. There has been no fever.  He and stridor. Cardiovascular: Negative. Negative for chest pain, palpitations and leg swelling. Gastrointestinal: Negative.   Negative for abdominal distention, abdominal pain, anal bleeding, blood in stool, constipation, diarrhea, nausea, rectal pain Oral Cap 1 capsule daily. 2   • finasteride (PROSCAR) 5 MG Oral Tab Take 5 mg by mouth daily. • acetaminophen (TYLENOL EXTRA STRENGTH) 500 MG Oral Tab Take 500 mg by mouth every 6 (six) hours as needed for Pain.      • Vitamin D3 (VITAMIN D3) 2000 UNIT oz (88.7 kg)   SpO2 98%   BMI 29.74 kg/m²   BP Readings from Last 3 Encounters:  01/07/21 : 134/70  11/25/19 : (!) 129/95  04/24/19 : 122/70    Wt Readings from Last 3 Encounters:  01/07/21 : 195 lb 9.6 oz (88.7 kg)  11/25/19 : 169 lb (76.7 kg)  10/28/19 : No foreign body present in the left eye. Right conjunctiva is not injected. Right conjunctiva has no hemorrhage. Left conjunctiva is not injected. Left conjunctiva has no hemorrhage. No scleral icterus.  Right eye exhibits normal extraocular motion and no n posterior cervical adenopathy present. Left cervical: No superficial cervical, no deep cervical and no posterior cervical adenopathy present. Right: No supraclavicular adenopathy present. Left: No supraclavicular adenopathy present.    Mirta Vargas additional tablet as needed for agitation 180 tablet 0   • Memantine HCl 10 MG Oral Tab Take 1 tablet (10 mg total) by mouth 2 (two) times daily. 180 tablet 4   • triamcinolone acetonide 0.1 % External Cream Apply topically 2 (two) times daily.      • aspir vascular disease   • Glaucoma Neg    • Diabetes Neg    • Macular degeneration Neg       SOCIAL HISTORY   Social History    Tobacco Use      Smoking status: Former Smoker        Quit date: 1975        Years since quittin.0      Smokeless tobacco: N bathes him. Family lives upstairs and he lives on the first floor. He has not had any falls. Has caretaker 4 days a week for 4 hrs. Fall precautions. 3    RTC 6 months.      Patient Instructions     ASSESSMENT AND PLAN:   Gilbert Pierce was seen today for nehemias History of gestational diabetes or birth of baby weighing more than 9 pounds     Covered at least every 3 years,         Glucose (mg/dL)   Date Value   03/08/2019 80    Medicare covers annually or at 6-month intervals for prediabetic patients        Cardio 75 PSA due on 03/08/2020  No results found for this or any previous visit.    Annually (age 48 or over), MADAY not paid separately when covered E/M service is provided on same date    Immunizations      Influenza  Covered Annually No orders found for this or Tajik)  www. [email or web address] org  This link also has information from the [address] [place] [address] regarding Advance Directives. The patient indicates understanding of these issues and agrees to the plan.     Problem List:  Patient Active Problem screening of functional status. Taking medications as prescribed: Cannot do without help   He has Hearing problems based on screening of functional status. Hearing Problems?: Yes  He has Walking problems based on screening of functional status.    Diffi Cholesterol Labs:   Lab Results   Component Value Date    CHOLEST 137 03/08/2019    HDL 47 03/08/2019    LDL 52 03/08/2019    TRIG 190 (H) 03/08/2019          Last Chemistry Labs:   Lab Results   Component Value Date    AST 16 03/08/2019    ALT 20 03/08/20 He  has a past surgical history that includes back surgery; colonoscopy (9101,6546); and other surgical history (2000, 2003). His family history includes Cancer in his sister; Cancer (age of onset: 40) in his sister;  Heart Disease (age of onset: 80) i Sometimes I avoid social activities because I cannot hear well and fear I will reply improperly: No   Family members and friends have told me they think I may have hearing loss:  Yes                  Visual Acuity  Right Eye Visual Acuity: Uncorrected Right No  How does the patient maintain a good energy level?: Daily Walks(bike stacionary)  How would you describe your current health state?: Good  How do you maintain positive mental well-being?: Games;Puzzles(read news paper)    This section provided for quic Clients of institutions for the mentally retarded   Persons who live in the same house as a HepB virus carrier   Homosexual men   Illicit injectable drug abusers     Tetanus Toxoid  Only covered with a cut with metal- TD and TDaP Not covered by [facility]

## 2021-01-12 ENCOUNTER — OFFICE VISIT (OUTPATIENT)
Dept: SURGERY | Facility: CLINIC | Age: 86
End: 2021-01-12
Payer: COMMERCIAL

## 2021-01-12 VITALS
WEIGHT: 195 LBS | DIASTOLIC BLOOD PRESSURE: 74 MMHG | BODY MASS INDEX: 29.55 KG/M2 | HEIGHT: 68 IN | RESPIRATION RATE: 16 BRPM | HEART RATE: 81 BPM | SYSTOLIC BLOOD PRESSURE: 147 MMHG

## 2021-01-12 DIAGNOSIS — R35.0 URINARY FREQUENCY: Primary | ICD-10-CM

## 2021-01-12 PROCEDURE — 3008F BODY MASS INDEX DOCD: CPT | Performed by: UROLOGY

## 2021-01-12 PROCEDURE — 3078F DIAST BP <80 MM HG: CPT | Performed by: UROLOGY

## 2021-01-12 PROCEDURE — 3077F SYST BP >= 140 MM HG: CPT | Performed by: UROLOGY

## 2021-01-12 PROCEDURE — 99205 OFFICE O/P NEW HI 60 MIN: CPT | Performed by: UROLOGY

## 2021-01-12 NOTE — PROGRESS NOTES
SUBJECTIVE:  Darius Mancini is a 80year old male who presents for a consultation at the request of, and a copy of this note will be sent to, Dr. Shreyl Khan, for evaluation of  benign prostatic hyperplasia. He states that the problem is unchanged.  eCozy 37        bone cancer (cause of death)   • Other (Other) Other         No vascular disease   • Glaucoma Neg    • Diabetes Neg    • Macular degeneration Neg       Social History: Social History    Tobacco Use      Smoking status: Former Smoker        Quit d (primary encounter diagnosis)    No orders of the defined types were placed in this encounter. Recommend:  -PSA to be done with next blood draw. -Bladder scan was done in the office today showing a volume of 241.   The patient had voided about 2 hours ear

## 2021-02-09 ENCOUNTER — PATIENT MESSAGE (OUTPATIENT)
Dept: INTERNAL MEDICINE CLINIC | Facility: CLINIC | Age: 86
End: 2021-02-09

## 2021-02-09 DIAGNOSIS — F03.90 DEMENTIA (HCC): ICD-10-CM

## 2021-02-09 DIAGNOSIS — R41.3 MEMORY LOSS: ICD-10-CM

## 2021-02-09 DIAGNOSIS — E03.9 HYPOTHYROIDISM: ICD-10-CM

## 2021-02-09 RX ORDER — MEMANTINE HYDROCHLORIDE 10 MG/1
10 TABLET ORAL 2 TIMES DAILY
Qty: 180 TABLET | Refills: 2 | Status: SHIPPED | OUTPATIENT
Start: 2021-02-09 | End: 2021-10-28

## 2021-02-09 RX ORDER — FINASTERIDE 5 MG/1
5 TABLET, FILM COATED ORAL DAILY
Qty: 90 TABLET | Refills: 1 | Status: SHIPPED | OUTPATIENT
Start: 2021-02-09 | End: 2021-10-17

## 2021-02-09 RX ORDER — QUETIAPINE 25 MG/1
TABLET, FILM COATED ORAL
Qty: 90 TABLET | Refills: 1 | OUTPATIENT
Start: 2021-02-09

## 2021-02-09 RX ORDER — FINASTERIDE 5 MG/1
5 TABLET, FILM COATED ORAL DAILY
Qty: 90 TABLET | Refills: 1 | OUTPATIENT
Start: 2021-02-09

## 2021-02-09 RX ORDER — QUETIAPINE 25 MG/1
TABLET, FILM COATED ORAL
Qty: 180 TABLET | Refills: 0 | Status: SHIPPED | OUTPATIENT
Start: 2021-02-09 | End: 2021-05-02

## 2021-02-09 RX ORDER — SIMVASTATIN 20 MG
20 TABLET ORAL NIGHTLY
Qty: 90 TABLET | Refills: 1 | Status: SHIPPED | OUTPATIENT
Start: 2021-02-09 | End: 2021-06-04

## 2021-02-09 RX ORDER — AMLODIPINE BESYLATE 5 MG/1
5 TABLET ORAL DAILY
Qty: 90 TABLET | Refills: 1 | Status: SHIPPED | OUTPATIENT
Start: 2021-02-09 | End: 2021-08-02

## 2021-02-09 RX ORDER — MEMANTINE HYDROCHLORIDE 10 MG/1
10 TABLET ORAL 2 TIMES DAILY
Qty: 180 TABLET | Refills: 1 | OUTPATIENT
Start: 2021-02-09

## 2021-02-09 NOTE — TELEPHONE ENCOUNTER
From: Kristi Schulte  To: Adalid Gusman.  Mita Teixeira MD  Sent: 2/9/2021 9:39 AM CST  Subject: Prescription Question    Doctor: I try to refill medications on my chart, it would not allow me?  could you refill this medications for my father?  thank you Zaire Horne

## 2021-02-09 NOTE — TELEPHONE ENCOUNTER
[facility] message sent to the patient. Domonique Bo MD  Em Rn Triage 11 minutes ago (12:57 PM)     He has a urologist and also a neurologist that refill the other medications.     Routing comment

## 2021-02-10 NOTE — TELEPHONE ENCOUNTER
Daughter, Jing Vera, on [employer]. Asking about refill request message below. Daughter states she was informed by  that she would refill them. Informed her that all 5 were sent into the pharmacy. No further questions.

## 2021-03-05 RX ORDER — QUETIAPINE FUMARATE 25 MG/1
TABLET, FILM COATED ORAL
Qty: 180 TABLET | Refills: 0 | OUTPATIENT
Start: 2021-03-05

## 2021-03-09 ENCOUNTER — LAB ENCOUNTER (OUTPATIENT)
Dept: LAB | Facility: HOSPITAL | Age: 86
End: 2021-03-09
Attending: INTERNAL MEDICINE
Payer: MEDICARE

## 2021-03-09 DIAGNOSIS — E03.9 ACQUIRED HYPOTHYROIDISM: ICD-10-CM

## 2021-03-09 DIAGNOSIS — R35.0 URINARY FREQUENCY: ICD-10-CM

## 2021-03-09 DIAGNOSIS — E78.2 MIXED HYPERLIPIDEMIA: ICD-10-CM

## 2021-03-09 LAB
ALBUMIN SERPL-MCNC: 3.5 G/DL (ref 3.4–5)
ALBUMIN/GLOB SERPL: 1 {RATIO} (ref 1–2)
ALP LIVER SERPL-CCNC: 70 U/L
ALT SERPL-CCNC: 22 U/L
ANION GAP SERPL CALC-SCNC: 4 MMOL/L (ref 0–18)
AST SERPL-CCNC: 15 U/L (ref 15–37)
BILIRUB SERPL-MCNC: 0.6 MG/DL (ref 0.1–2)
BUN BLD-MCNC: 22 MG/DL (ref 7–18)
BUN/CREAT SERPL: 19 (ref 10–20)
CALCIUM BLD-MCNC: 9.1 MG/DL (ref 8.5–10.1)
CHLORIDE SERPL-SCNC: 109 MMOL/L (ref 98–112)
CHOLEST SMN-MCNC: 126 MG/DL (ref ?–200)
CO2 SERPL-SCNC: 30 MMOL/L (ref 21–32)
CREAT BLD-MCNC: 1.16 MG/DL
GLOBULIN PLAS-MCNC: 3.5 G/DL (ref 2.8–4.4)
GLUCOSE BLD-MCNC: 91 MG/DL (ref 70–99)
HDLC SERPL-MCNC: 51 MG/DL (ref 40–59)
LDLC SERPL CALC-MCNC: 53 MG/DL (ref ?–100)
M PROTEIN MFR SERPL ELPH: 7 G/DL (ref 6.4–8.2)
NONHDLC SERPL-MCNC: 75 MG/DL (ref ?–130)
OSMOLALITY SERPL CALC.SUM OF ELEC: 299 MOSM/KG (ref 275–295)
PATIENT FASTING Y/N/NP: YES
PATIENT FASTING Y/N/NP: YES
POTASSIUM SERPL-SCNC: 4 MMOL/L (ref 3.5–5.1)
PSA SERPL-MCNC: 3.35 NG/ML (ref ?–4)
SODIUM SERPL-SCNC: 143 MMOL/L (ref 136–145)
T4 FREE SERPL-MCNC: 0.9 NG/DL (ref 0.8–1.7)
TRIGL SERPL-MCNC: 110 MG/DL (ref 30–149)
TSI SER-ACNC: 3.6 MIU/ML (ref 0.36–3.74)
VLDLC SERPL CALC-MCNC: 22 MG/DL (ref 0–30)

## 2021-03-09 PROCEDURE — 84439 ASSAY OF FREE THYROXINE: CPT

## 2021-03-09 PROCEDURE — 80053 COMPREHEN METABOLIC PANEL: CPT

## 2021-03-09 PROCEDURE — 84153 ASSAY OF PSA TOTAL: CPT

## 2021-03-09 PROCEDURE — 80061 LIPID PANEL: CPT

## 2021-03-09 PROCEDURE — 36415 COLL VENOUS BLD VENIPUNCTURE: CPT

## 2021-03-09 PROCEDURE — 84443 ASSAY THYROID STIM HORMONE: CPT

## 2021-03-11 ENCOUNTER — PATIENT MESSAGE (OUTPATIENT)
Dept: INTERNAL MEDICINE CLINIC | Facility: CLINIC | Age: 86
End: 2021-03-11

## 2021-03-11 NOTE — TELEPHONE ENCOUNTER
From: Doug Triplett  To: Sunni Schreiber.  Bill Salvador MD  Sent: 3/11/2021 5:35 PM CST  Subject: Test Results Question    Doctor: my father had diarrhea for about three days, I gave him Pepto bismol and he is doing better, but I notice the urinal that he uses at [facility]

## 2021-03-17 ENCOUNTER — LAB ENCOUNTER (OUTPATIENT)
Dept: LAB | Facility: HOSPITAL | Age: 86
End: 2021-03-17
Attending: INTERNAL MEDICINE
Payer: MEDICARE

## 2021-03-17 DIAGNOSIS — N28.9 RENAL INSUFFICIENCY: ICD-10-CM

## 2021-03-17 LAB
ANION GAP SERPL CALC-SCNC: 2 MMOL/L (ref 0–18)
BUN BLD-MCNC: 19 MG/DL (ref 7–18)
BUN/CREAT SERPL: 15.4 (ref 10–20)
CALCIUM BLD-MCNC: 9.1 MG/DL (ref 8.5–10.1)
CHLORIDE SERPL-SCNC: 109 MMOL/L (ref 98–112)
CO2 SERPL-SCNC: 31 MMOL/L (ref 21–32)
CREAT BLD-MCNC: 1.23 MG/DL
GLUCOSE BLD-MCNC: 126 MG/DL (ref 70–99)
OSMOLALITY SERPL CALC.SUM OF ELEC: 298 MOSM/KG (ref 275–295)
PATIENT FASTING Y/N/NP: NO
POTASSIUM SERPL-SCNC: 3.7 MMOL/L (ref 3.5–5.1)
SODIUM SERPL-SCNC: 142 MMOL/L (ref 136–145)

## 2021-03-17 PROCEDURE — 36415 COLL VENOUS BLD VENIPUNCTURE: CPT

## 2021-03-17 PROCEDURE — 80048 BASIC METABOLIC PNL TOTAL CA: CPT

## 2021-03-24 ENCOUNTER — TELEPHONE (OUTPATIENT)
Dept: SURGERY | Facility: CLINIC | Age: 86
End: 2021-03-24

## 2021-03-24 NOTE — TELEPHONE ENCOUNTER
Mychart message converted to TE and FWD to pool and TRENTON to advise on labs from PCP   Ultrasound in system from PCP, not scheduled yet

## 2021-03-24 NOTE — TELEPHONE ENCOUNTER
----- Message from Eden Caballeroalta sent at 3/24/2021  2:29 PM CDT -----  Regarding: Test Results Question  Contact: [phone]  Doctor my father had two blood tests March 8 and March 15  Doctor Suzanne Kohli is concern with the results she ordered an   ultrasound of the kidneys ,  Please review the results an let me know if you want more tests  Minna Alejandro

## 2021-03-25 NOTE — TELEPHONE ENCOUNTER
It looks like he has mild renal insufficiency. I would agree that he needs to obtain a renal bladder ultrasound for further management.

## 2021-03-26 ENCOUNTER — HOSPITAL ENCOUNTER (OUTPATIENT)
Dept: ULTRASOUND IMAGING | Age: 86
Discharge: HOME | End: 2021-03-26
Attending: INTERNAL MEDICINE
Payer: MEDICARE

## 2021-03-26 DIAGNOSIS — N28.9 RENAL INSUFFICIENCY: ICD-10-CM

## 2021-03-26 PROCEDURE — 76770 US EXAM ABDO BACK WALL COMP: CPT | Performed by: INTERNAL MEDICINE

## 2021-03-29 PROBLEM — N32.3 DIVERTICULUM OF BLADDER: Status: ACTIVE | Noted: 2021-03-29

## 2021-04-26 ENCOUNTER — OFFICE VISIT (OUTPATIENT)
Dept: SURGERY | Facility: CLINIC | Age: 86
End: 2021-04-26
Payer: COMMERCIAL

## 2021-04-26 ENCOUNTER — TELEPHONE (OUTPATIENT)
Dept: INTERNAL MEDICINE CLINIC | Facility: CLINIC | Age: 86
End: 2021-04-26

## 2021-04-26 VITALS
HEART RATE: 63 BPM | WEIGHT: 195 LBS | BODY MASS INDEX: 30 KG/M2 | DIASTOLIC BLOOD PRESSURE: 70 MMHG | SYSTOLIC BLOOD PRESSURE: 130 MMHG

## 2021-04-26 DIAGNOSIS — R35.0 URINARY FREQUENCY: Primary | ICD-10-CM

## 2021-04-26 PROCEDURE — 3075F SYST BP GE 130 - 139MM HG: CPT | Performed by: UROLOGY

## 2021-04-26 PROCEDURE — 3078F DIAST BP <80 MM HG: CPT | Performed by: UROLOGY

## 2021-04-26 PROCEDURE — 99213 OFFICE O/P EST LOW 20 MIN: CPT | Performed by: UROLOGY

## 2021-04-26 NOTE — PROGRESS NOTES
Flavia Voss is a [address] old male. HPI:   Patient presents with: Follow - Up: patient presents for f/u on psa       17-year-old male accompanied by his daughter with a history of dementia in follow-up to visit January 12, 2021. Overall doing well. Years since quittin.3      Smokeless tobacco: Never Used    Alcohol use: No    Drug use: No       Medications (Active prior to today's visit):  Current Outpatient Medications   Medication Sig Dispense Refill   • amLODIPine Besylate 5 MG Oral Tab Ta follow-up on a as needed basis. Orders This Visit:  No orders of the defined types were placed in this encounter.       Meds This Visit:  Requested Prescriptions      No prescriptions requested or ordered in this encounter       Imaging & Referrals:

## 2021-04-26 NOTE — TELEPHONE ENCOUNTER
Arlen Molina, is the daughter of the patient. She needs to completed Guardianship papers before his next appointment in July. She would like an appointment in May.     Please contact Sloane Alford, [phone] to complete guardianship papers with Dr. Yenifer Keller or shawanda

## 2021-04-28 PROBLEM — Z71.85 VACCINE COUNSELING: Status: ACTIVE | Noted: 2021-04-28

## 2021-04-29 NOTE — PATIENT INSTRUCTIONS
ASSESSMENT/PLAN:   Dementia with behavioral disturbance, unspecified dementia type (hcc)  (primary encounter diagnosis) Stable. Acquired hypothyroidism Stable. Mixed hyperlipidemia Stable. Essential hypertension, benign Good control.  Careful occur anywhere, but they’re most common on the back, chest, or belly (abdomen). ? They usually appear on only one side of the body, spreading along the nerve pathway where the virus is reactivating. ?  The rash can also form around an eye, along one side last for only a few days, or for months or even years after you have had shingles. Antiviral medicines given during the first 72 hours of the rash can reduce the chance of postherpetic neuralgia.  Other medicines can be prescribed to help ease the pain and vaccine if you are healthy and age 48 or older, even if you've had shingles in the past. Two shots of the RZV vaccine are recommended.  You should get the second RZV shot 2 to 6 months after the first. The vaccine makes it less likely that you will develop do not require medical attention (report these to your doctor or health care professional if they continue or are bothersome):  · chills  · headache  · fever  · nausea, vomiting  · redness, warmth, pain, swelling or itching at site where injected  · tiredn

## 2021-04-29 NOTE — PROGRESS NOTES
HPI:    Patient ID: Srinivasan Robertson is a 80year old male. Patient presents with:  Complete Form   Hired . Needs form filled. Has caregiver paid by gov't 4 days a week. Hard to shower at bedtime. Caregiver gives the patient the shower.   Ronnie Mathew 147/74    Labs:   Lab Results   Component Value Date/Time     (H) 03/17/2021 10:57 AM     03/17/2021 10:57 AM    K 3.7 03/17/2021 10:57 AM     03/17/2021 10:57 AM    CO2 31.0 03/17/2021 10:57 AM    CREATSERUM 1.23 03/17/2021 10:57 AM cough, choking, chest tightness, shortness of breath, wheezing and stridor. Cardiovascular: Negative for chest pain, palpitations and leg swelling. Gastrointestinal: Negative for diarrhea.    Endocrine: Negative for cold intolerance, heat intolerance, 19%-Negative biopsies   • Floaters 2008    OD   • Foot drop 2008    w/ weakness   • Lipid screening 3/15/2008    per: NG   • Other and unspecified hyperlipidemia    • Pinguecula of both eyes     OU   • Prostate disorder    • Stomach ulcer 1969    per: NG-b rhythm. Pulses: Normal pulses. Carotid pulses are 2+ on the right side and 2+ on the left side. Radial pulses are 2+ on the right side and 2+ on the left side.         Dorsalis pedis pulses are 2+ on the right side and 2+ on the left si Good control. Careful with diet and excercise at least 30 minutes 3-4 times a week. Check blood pressures at different times on different days. Can purchase own blood pressure monitor. If not, check at local pharmacy.  Bake foods more and grill occasionally

## 2021-05-02 RX ORDER — QUETIAPINE FUMARATE 25 MG/1
TABLET, FILM COATED ORAL
Qty: 180 TABLET | Refills: 0 | Status: SHIPPED | OUTPATIENT
Start: 2021-05-02 | End: 2021-07-28

## 2021-05-03 ENCOUNTER — TELEPHONE (OUTPATIENT)
Dept: INTERNAL MEDICINE CLINIC | Facility: CLINIC | Age: 86
End: 2021-05-03

## 2021-05-03 NOTE — TELEPHONE ENCOUNTER
Called Nereida patient daughter, to let her know the forms are ready for her to . She states she will be here tomorrow to pick them up.

## 2021-05-14 ENCOUNTER — TELEPHONE (OUTPATIENT)
Dept: INTERNAL MEDICINE CLINIC | Facility: CLINIC | Age: 86
End: 2021-05-14

## 2021-05-29 ENCOUNTER — TELEPHONE (OUTPATIENT)
Dept: INTERNAL MEDICINE CLINIC | Facility: CLINIC | Age: 86
End: 2021-05-29

## 2021-05-29 NOTE — TELEPHONE ENCOUNTER
Dr. Lila Gunter received forms request from [place] [id number]  for his daughter Love Acevedo.   Emailed forms to the forms department

## 2021-06-04 DIAGNOSIS — R41.3 MEMORY LOSS: ICD-10-CM

## 2021-06-04 DIAGNOSIS — E03.9 HYPOTHYROIDISM: ICD-10-CM

## 2021-06-04 DIAGNOSIS — F03.90 DEMENTIA (HCC): ICD-10-CM

## 2021-06-04 RX ORDER — SIMVASTATIN 20 MG
20 TABLET ORAL NIGHTLY
Qty: 90 TABLET | Refills: 1 | Status: SHIPPED | OUTPATIENT
Start: 2021-06-04 | End: 2021-12-30

## 2021-06-28 NOTE — TELEPHONE ENCOUNTER
Betti Homans from [facility] called stating they never rec'd form completed by Dr. Blanka Cedeno.  Completed Report of Physician for Guardianship faxed to [phone] attn:Shyanne

## 2021-06-29 NOTE — TELEPHONE ENCOUNTER
Dr. Yasir Hanley,    Law firm requesting additional information needed for guardianship papers you recently completed. Please print out form (blue hyperlync), read circled paragraph and answer on form.  You stated he was not capable of financial decisions, but the

## 2021-07-08 ENCOUNTER — HOSPITAL ENCOUNTER (OUTPATIENT)
Dept: CT IMAGING | Facility: HOSPITAL | Age: 86
Discharge: HOME | End: 2021-07-08
Attending: INTERNAL MEDICINE
Payer: MEDICARE

## 2021-07-08 ENCOUNTER — TELEPHONE (OUTPATIENT)
Dept: INTERNAL MEDICINE CLINIC | Facility: CLINIC | Age: 86
End: 2021-07-08

## 2021-07-08 ENCOUNTER — OFFICE VISIT (OUTPATIENT)
Dept: INTERNAL MEDICINE CLINIC | Facility: CLINIC | Age: 86
End: 2021-07-08
Payer: COMMERCIAL

## 2021-07-08 VITALS
DIASTOLIC BLOOD PRESSURE: 68 MMHG | RESPIRATION RATE: 18 BRPM | TEMPERATURE: 98 F | BODY MASS INDEX: 29.76 KG/M2 | SYSTOLIC BLOOD PRESSURE: 137 MMHG | HEIGHT: 68 IN | WEIGHT: 196.38 LBS | OXYGEN SATURATION: 98 % | HEART RATE: 67 BPM

## 2021-07-08 DIAGNOSIS — Z71.85 VACCINE COUNSELING: ICD-10-CM

## 2021-07-08 DIAGNOSIS — R05.9 COUGH: ICD-10-CM

## 2021-07-08 DIAGNOSIS — F03.90 DEMENTIA WITHOUT BEHAVIORAL DISTURBANCE, UNSPECIFIED DEMENTIA TYPE (HCC): ICD-10-CM

## 2021-07-08 DIAGNOSIS — E03.9 ACQUIRED HYPOTHYROIDISM: ICD-10-CM

## 2021-07-08 DIAGNOSIS — I10 ESSENTIAL HYPERTENSION, BENIGN: Primary | ICD-10-CM

## 2021-07-08 DIAGNOSIS — R26.9 GAIT DIFFICULTY: ICD-10-CM

## 2021-07-08 DIAGNOSIS — R29.898 WEAKNESS OF EXTREMITY: ICD-10-CM

## 2021-07-08 PROCEDURE — 3008F BODY MASS INDEX DOCD: CPT | Performed by: INTERNAL MEDICINE

## 2021-07-08 PROCEDURE — 3078F DIAST BP <80 MM HG: CPT | Performed by: INTERNAL MEDICINE

## 2021-07-08 PROCEDURE — 99215 OFFICE O/P EST HI 40 MIN: CPT | Performed by: INTERNAL MEDICINE

## 2021-07-08 PROCEDURE — G0009 ADMIN PNEUMOCOCCAL VACCINE: HCPCS | Performed by: INTERNAL MEDICINE

## 2021-07-08 PROCEDURE — 3075F SYST BP GE 130 - 139MM HG: CPT | Performed by: INTERNAL MEDICINE

## 2021-07-08 PROCEDURE — 90732 PPSV23 VACC 2 YRS+ SUBQ/IM: CPT | Performed by: INTERNAL MEDICINE

## 2021-07-08 PROCEDURE — 70450 CT HEAD/BRAIN W/O DYE: CPT | Performed by: INTERNAL MEDICINE

## 2021-07-08 NOTE — TELEPHONE ENCOUNTER
Patient was advice to return in 4 months he need a morning appointment in [place] office please advice

## 2021-07-08 NOTE — PROGRESS NOTES
HPI:    Patient ID: Katharine Bean is a 80year old male. Taking care of dad for 20 yrs per daughter Leodan Sweeney. Has 2 brothers. One is living with dad, moved out after living with dad. Other brother is in [place] but not in same house.  This brother presigns lb 9.6 oz (89.2 kg)  04/26/21 : 195 lb (88.5 kg)    BP Readings from Last 3 Encounters:  07/08/21 : 137/68  04/29/21 : 143/77  04/26/21 : 130/70    Labs:   Lab Results   Component Value Date/Time     (H) 03/17/2021 10:57 AM     03/17/2021 10:5 sores, nosebleeds, postnasal drip, rhinorrhea, sinus pressure, sinus pain, sneezing, sore throat, tinnitus, trouble swallowing and voice change. Eyes: Negative for photophobia, pain, discharge, redness, itching and visual disturbance.    Respiratory: Pos STRENGTH) 500 MG Oral Tab Take 500 mg by mouth every 6 (six) hours as needed for Pain. • Vitamin D3 (VITAMIN D3) 2000 UNITS Oral Cap Take 2,000 Units by mouth daily. • Vitamin B-12 (VITAMIN B12) 1000 MCG Oral Tab Take 1,000 mcg by mouth daily. (88.5 kg)      Physical Exam  Vitals and nursing note reviewed. Constitutional:       General: He is not in acute distress. Appearance: He is well-developed. He is not ill-appearing, toxic-appearing or diaphoretic.       Interventions: He is not intub pedis pulses are 2+ on the right side and 2+ on the left side. Posterior tibial pulses are 2+ on the right side and 2+ on the left side.       Heart sounds: Normal heart sounds, S1 normal and S2 normal.   Pulmonary:      Effort: Pulmonary effort is n Decreased range of motion. Anterior drawer test negative. Normal pulse. Left Achilles Tendon: No tenderness or defects. Gonzalez's test negative. Comments: Walks with left foot pointed outwards or laterally.    Lymphadenopathy:      Head:      Rig foods. No salt. Use other seasonings. Acquired hypothyroidism Stable. Vaccine counseling Shingrix vaccine info. Check on insurance coverage. PCV 23 today. Dementia without behavioral disturbance, unspecified dementia type (hcc) Stable.      Weak

## 2021-07-08 NOTE — PATIENT INSTRUCTIONS
ASSESSMENT/PLAN:   Essential hypertension, benign  (primary encounter diagnosis) Good control. Careful with diet and excercise at least 30 minutes 3-4 times a week. Check blood pressures at different times on different days.  Can purchase own blood pre the symptoms of shingles? 1. The first sign of shingles is often pain, burning, tingling, or itching on one part of your face or body. You may also feel as if you have the flu, with fever and chills.   2. A red rash with small blisters appears in a few day lasting effects. But some people have complications during or after the infection comes out:  · Postherpetic neuralgia. This is the most common complication. It's more likely as people age, especially after age 61.  It' is nerve pain at the place where the particularly if they have never had chicken pox. The shingles vaccine  Two shingles vaccines are available to help prevent shingles or make it less painful:  · Zoster vaccine live (ZVL)  · Recombinant zoster vaccine (Magaly Hasting).  This is a newer vaccine that has children. What side effects may I notice from receiving this medicine?   Side effects that you should report to your doctor or health care professional as soon as possible:  · allergic reactions like skin rash, itching or hives, swelling of the face, lips,

## 2021-07-09 ENCOUNTER — TELEPHONE (OUTPATIENT)
Dept: INTERNAL MEDICINE CLINIC | Facility: CLINIC | Age: 86
End: 2021-07-09

## 2021-07-09 NOTE — TELEPHONE ENCOUNTER
Spoke to Ne[place] to see if she wants me to send all forms by fax to . Neavitt said to fax it to them.  Forms(ccp-[id number]) faxed to [phone]

## 2021-07-20 ENCOUNTER — PATIENT MESSAGE (OUTPATIENT)
Dept: INTERNAL MEDICINE CLINIC | Facility: CLINIC | Age: 86
End: 2021-07-20

## 2021-07-21 NOTE — TELEPHONE ENCOUNTER
From: Caroline Zhou  To: Katheryn Shook.  Meg Cochran MD  Sent: 7/20/2021 11:13 AM CDT  Subject: Other    My father has received a summons for jury service, could you write a letter that he is unable to attend    thank you Mamadou Briones

## 2021-07-21 NOTE — TELEPHONE ENCOUNTER
Hello triage team  As you know, I am not on-call today.   I am not sure why you guys are forwarding me the messages for Dr. Rafael El

## 2021-07-21 NOTE — TELEPHONE ENCOUNTER
Routed to Dr. Elmira Alexander for Dr Santos Villeda for advise, thanks.     Future Appointments   Date Time Provider Kateryna Thu   8/6/2021 10:00 AM CF XR RM3 IVP [facility] DIAG RAD [facility]   11/8/2021 11:30 AM Johnny Medrano MD [id number] [place] [id number]

## 2021-07-28 RX ORDER — QUETIAPINE FUMARATE 25 MG/1
TABLET, FILM COATED ORAL
Qty: 180 TABLET | Refills: 0 | Status: SHIPPED | OUTPATIENT
Start: 2021-07-28

## 2021-08-02 DIAGNOSIS — R41.3 MEMORY LOSS: ICD-10-CM

## 2021-08-02 DIAGNOSIS — E03.9 HYPOTHYROIDISM: ICD-10-CM

## 2021-08-02 DIAGNOSIS — F03.90 DEMENTIA (HCC): ICD-10-CM

## 2021-08-02 RX ORDER — AMLODIPINE BESYLATE 5 MG/1
5 TABLET ORAL DAILY
Qty: 90 TABLET | Refills: 1 | Status: SHIPPED | OUTPATIENT
Start: 2021-08-02 | End: 2022-02-22

## 2021-08-02 NOTE — TELEPHONE ENCOUNTER
Refill passed per [facility] protocol.      Requested Prescriptions   Pending Prescriptions Disp Refills    AMLODIPINE BESYLATE 5 MG Oral Tab [Pharmacy Med Name: AMLODIPINE BESYLATE 5 MG TAB] 90 tablet 1     Sig: TAKE 1 TABLET BY MOUTH EVERY DAY        Hypertensive Medications Protocol Passed - 8/2/2021  9:06 AM        Passed - CMP or BMP in past 12 months        Passed - Appointment in past 6 or next 3 months        Passed - GFR Non- > 50     Lab Results   Component Value Date    GFRNAA 51 (L) 03/17/2021                      Recent Outpatient Visits              3 weeks ago Essential hypertension, benign    [facility] [place] [place], Deb Galvan MD    Office Visit    3 months ago Dementia with behavioral disturbance, unspecified dementia type [facility])    [facility] [place] [place], Deb Galvan MD    Office Visit    3 months ago Urinary frequency    [facility] for Zoie Gutierrez MD    Office Visit    5 months ago Encounter for vaccination    [facility] [place]    Nurse Only    5 months ago Encounter for vaccination    [place]    Nurse Only             Future Appointments         Provider Department Appt Notes    In 4 days Rudy Polanco, MARNIE; [facility] XR RM3 IVP [facility] Speech Pathology     In 3 months Tiffany Aguilar MD [facility] [place], [facility] per dr mayberry

## 2021-08-06 ENCOUNTER — HOSPITAL ENCOUNTER (OUTPATIENT)
Dept: GENERAL RADIOLOGY | Facility: HOSPITAL | Age: 86
Discharge: HOME | End: 2021-08-06
Attending: INTERNAL MEDICINE
Payer: MEDICARE

## 2021-08-06 DIAGNOSIS — R05.9 COUGH: ICD-10-CM

## 2021-08-06 PROCEDURE — 92611 MOTION FLUOROSCOPY/SWALLOW: CPT

## 2021-08-06 PROCEDURE — 74230 X-RAY XM SWLNG FUNCJ C+: CPT | Performed by: INTERNAL MEDICINE

## 2021-08-06 NOTE — PATIENT INSTRUCTIONS
Solids: Minced and moist  Liquids:  Thin    Recommended compensatory strategies:   Sit upright  Small sips  Small bites  Eat slowly  Alternate liquids and solids    Medication Administration:  Take whole in pureed    Avanell Handing MA/CCC-SLP  Speech Silverio Varela

## 2021-08-06 NOTE — PROGRESS NOTES
ADULT VIDEOFLUOROSCOPIC SWALLOWING STUDY       ADULT VIDEOFLUOROSCOPIC SWALLOWING STUDY:   Referring Physician: Lionel Singh      Radiologist: Dr. Dread Fierro  Diagnosis: dysphagia    Date of Service: 8/6/2021     PATIENT SUMMARY   Chief Complaint: The patient w puree, solid and thin liquids. Study Position and View:  Patient was seated upright and viewed laterally. Pain Assessment: The patient reports pain at a level of 0/10.     Oral phase:  Reduced bolus formation and control resulted in moderately increas Thin    Recommended compensatory strategies:   Sit upright  Small sips  Small bites  Eat slowly  Alternate liquids and solids    Medication Administration:  Take whole in pureed    Further Follow-up:  None warranted at this time.       EDUCATION/INSTRUCTION

## 2021-08-16 ENCOUNTER — TELEPHONE (OUTPATIENT)
Dept: INTERNAL MEDICINE CLINIC | Facility: CLINIC | Age: 86
End: 2021-08-16

## 2021-08-16 NOTE — TELEPHONE ENCOUNTER
Joel Schafer MD 3 days ago     had first shingles shot at [facility] pharmacy August 13   Patient send a my chart message: Added shingles vaccines in his immunization history.

## 2021-10-02 ENCOUNTER — HOSPITAL ENCOUNTER (OUTPATIENT)
Dept: GENERAL RADIOLOGY | Facility: HOSPITAL | Age: 86
Discharge: HOME | End: 2021-10-02
Attending: INTERNAL MEDICINE
Payer: MEDICARE

## 2021-10-02 ENCOUNTER — OFFICE VISIT (OUTPATIENT)
Dept: OTOLARYNGOLOGY | Facility: CLINIC | Age: 86
End: 2021-10-02
Payer: COMMERCIAL

## 2021-10-02 DIAGNOSIS — H61.23 CERUMEN DEBRIS ON TYMPANIC MEMBRANE OF BOTH EARS: Primary | ICD-10-CM

## 2021-10-02 DIAGNOSIS — R05.9 COUGH: ICD-10-CM

## 2021-10-02 PROCEDURE — 69210 REMOVE IMPACTED EAR WAX UNI: CPT | Performed by: SPECIALIST

## 2021-10-02 PROCEDURE — 71046 X-RAY EXAM CHEST 2 VIEWS: CPT | Performed by: INTERNAL MEDICINE

## 2021-10-02 NOTE — PROGRESS NOTES
Ears = bilateral cerumen occlussions. Fully cleaned under microscope using instrumentation and suctioning. Normal tympanic membranes. Ears fully cleaned. Follow-up if there are further problems. Continued use of hearing aids.

## 2021-10-02 NOTE — PATIENT INSTRUCTIONS
Your ears were fully cleaned of cerumen and hair. Follow-up if there are further problems. Continued use of hearing aids.

## 2021-10-04 DIAGNOSIS — R41.3 MEMORY LOSS: Primary | ICD-10-CM

## 2021-10-04 RX ORDER — MEMANTINE HYDROCHLORIDE 28 MG/1
28 CAPSULE, EXTENDED RELEASE ORAL DAILY
COMMUNITY
Start: 2021-09-13

## 2021-10-14 ENCOUNTER — IMMUNIZATION (OUTPATIENT)
Dept: LAB | Facility: HOSPITAL | Age: 86
End: 2021-10-14
Attending: EMERGENCY MEDICINE
Payer: MEDICARE

## 2021-10-14 DIAGNOSIS — Z23 NEED FOR VACCINATION: Primary | ICD-10-CM

## 2021-10-14 PROCEDURE — 0003A SARSCOV2 VAC 30MCG/0.3ML IM: CPT

## 2021-10-17 DIAGNOSIS — R41.3 MEMORY LOSS: ICD-10-CM

## 2021-10-17 DIAGNOSIS — F03.90 DEMENTIA (HCC): ICD-10-CM

## 2021-10-17 DIAGNOSIS — E03.9 HYPOTHYROIDISM: ICD-10-CM

## 2021-10-17 RX ORDER — FINASTERIDE 5 MG/1
5 TABLET, FILM COATED ORAL DAILY
Qty: 90 TABLET | Refills: 1 | Status: SHIPPED | OUTPATIENT
Start: 2021-10-17 | End: 2022-04-18

## 2021-10-18 NOTE — TELEPHONE ENCOUNTER
Please review refill protocol failed/ no protocol  Requested Prescriptions   Pending Prescriptions Disp Refills    FINASTERIDE 5 MG Oral Tab [Pharmacy Med Name: FINASTERIDE 5 MG TABLET] 90 tablet 1     Sig: TAKE 1 TABLET BY MOUTH EVERY DAY        There is no refill protocol information for this order

## 2021-10-28 RX ORDER — MEMANTINE HYDROCHLORIDE 10 MG/1
10 TABLET ORAL 2 TIMES DAILY
Qty: 180 TABLET | Refills: 2 | Status: SHIPPED | OUTPATIENT
Start: 2021-10-28 | End: 2021-11-05 | Stop reason: DRUGHIGH

## 2021-11-08 ENCOUNTER — OFFICE VISIT (OUTPATIENT)
Dept: INTERNAL MEDICINE CLINIC | Facility: CLINIC | Age: 86
End: 2021-11-08
Payer: COMMERCIAL

## 2021-11-08 VITALS
DIASTOLIC BLOOD PRESSURE: 73 MMHG | HEART RATE: 76 BPM | OXYGEN SATURATION: 97 % | BODY MASS INDEX: 29.98 KG/M2 | HEIGHT: 68 IN | RESPIRATION RATE: 18 BRPM | TEMPERATURE: 98 F | SYSTOLIC BLOOD PRESSURE: 130 MMHG | WEIGHT: 197.81 LBS

## 2021-11-08 DIAGNOSIS — E03.9 ACQUIRED HYPOTHYROIDISM: Primary | ICD-10-CM

## 2021-11-08 DIAGNOSIS — Z71.85 VACCINE COUNSELING: ICD-10-CM

## 2021-11-08 DIAGNOSIS — F03.91 DEMENTIA WITH BEHAVIORAL DISTURBANCE, UNSPECIFIED DEMENTIA TYPE (HCC): ICD-10-CM

## 2021-11-08 DIAGNOSIS — I10 ESSENTIAL HYPERTENSION, BENIGN: ICD-10-CM

## 2021-11-08 PROCEDURE — 3008F BODY MASS INDEX DOCD: CPT | Performed by: INTERNAL MEDICINE

## 2021-11-08 PROCEDURE — 81003 URINALYSIS AUTO W/O SCOPE: CPT | Performed by: INTERNAL MEDICINE

## 2021-11-08 PROCEDURE — 99214 OFFICE O/P EST MOD 30 MIN: CPT | Performed by: INTERNAL MEDICINE

## 2021-11-08 PROCEDURE — 3078F DIAST BP <80 MM HG: CPT | Performed by: INTERNAL MEDICINE

## 2021-11-08 PROCEDURE — 3075F SYST BP GE 130 - 139MM HG: CPT | Performed by: INTERNAL MEDICINE

## 2021-11-08 PROCEDURE — G0008 ADMIN INFLUENZA VIRUS VAC: HCPCS | Performed by: INTERNAL MEDICINE

## 2021-11-08 PROCEDURE — 90662 IIV NO PRSV INCREASED AG IM: CPT | Performed by: INTERNAL MEDICINE

## 2021-11-08 NOTE — PATIENT INSTRUCTIONS
Shingles (Herpes Zoster)     Talk to your healthcare provider about the shingles vaccine. Shingles is also called herpes zoster. It's a painful skin rash caused by the herpes zoster virus. This is the same virus that causes chickenpox.  After a person h medicines are most often only prescribed if you are seen by a healthcare provider within the first 72 hours of having the rash. But antiviral medicines may be prescribed even after 72 hours if someone's immune system is weak.  Or if the infection is extensi of these:  · New symptoms or symptoms that don’t go away with treatment  · A rash or blisters near your eye  · More drainage, fever, or rash after treatment  · Severe pain that doesn’t go away  How can shingles be prevented?   You can only get shingles if y substitute for professional medical care. Always follow your healthcare professional's instructions. Text SUPPORT1 us 22689 to learn if you may be eligible for financial support with your medication(s). Msg & Data Rates May Apply.  Msg freq varies care provider before I take this medicine?   They need to know if you have any of these conditions:  · blood disorders or disease  · cancer like leukemia or lymphoma  · immune system problems or therapy  · an unusual or allergic reaction to vaccines, other Referrals:  FLU VACC HIGH DOSE PRSV FREE     2nd shingrix thru pharmacy.    RTc 1-7-22 for physical.

## 2021-11-08 NOTE — PROGRESS NOTES
HPI:    Patient ID: Katharine Bean is a 80year old male. Hypertension  Patient is here for follow up of hypertension. BP at home: not check. Has been compliant with medications. Exercise level: somewhat active and has been following low salt diet.   Evander Harada JAVON Orozco 75 03/09/2021 09:12 AM          Saw neurologist, increased meds. Memory lot worse. More often. Asked same question over and over again. Denies any other co.     Conjunctivitis  This is a new problem.  The current episode started in the past 7 agitation, behavioral problems, confusion, dysphoric mood, hallucinations, self-injury, sleep disturbance and suicidal ideas. The patient is not nervous/anxious and is not hyperactive. Zones out more. More active with caregiver.     All other system per: NG-bleeding      Past Surgical History:   Procedure Laterality Date   • BACK SURGERY      2 back surgeries   • COLONOSCOPY  2000,2007    per: NG   • OTHER SURGICAL HISTORY  2000, 2003    x2 negative prostate biopsies      Family History   Problem Rela body, discharge or hordeolum. Extraocular Movements: Extraocular movements intact. Right eye: Normal extraocular motion and no nystagmus. Left eye: Normal extraocular motion and no nystagmus.       Conjunctiva/sclera:      Right eye: Right co No superficial, deep or posterior cervical adenopathy. Upper Body:      Right upper body: No supraclavicular adenopathy. Left upper body: No supraclavicular adenopathy. Skin:     General: Skin is warm and dry.    Neurological:      Mental Status

## 2021-11-25 DIAGNOSIS — F03.90 DEMENTIA (HCC): ICD-10-CM

## 2021-11-25 DIAGNOSIS — R41.3 MEMORY LOSS: ICD-10-CM

## 2021-11-25 DIAGNOSIS — E03.9 HYPOTHYROIDISM: ICD-10-CM

## 2021-11-26 RX ORDER — OMEPRAZOLE 20 MG/1
20 CAPSULE, DELAYED RELEASE ORAL DAILY
Qty: 90 CAPSULE | Refills: 1 | Status: SHIPPED | OUTPATIENT
Start: 2021-11-26 | End: 2022-05-19

## 2021-11-26 NOTE — TELEPHONE ENCOUNTER
Refill passed per [place] [facility] protocol.     Requested Prescriptions   Pending Prescriptions Disp Refills    OMEPRAZOLE 20 MG Oral Capsule Delayed Release [Pharmacy Med Name: OMEPRAZOLE DR 20 MG CAPSULE] 90 capsule 1     Sig: TAKE 1 CAPSULE BY MOUTH EVERY DAY        Gastrointestional Medication Protocol Passed - 11/25/2021  9:42 PM        Passed - Appointment in past 12 or next 3 months              Future Appointments         Provider Department Appt Notes    In 1 month Perfecto Goldmann., MD [facility] [place], [address]             Recent Outpatient Visits              2 weeks ago Acquired hypothyroidism    [facility], Kandy Garcia MD    Office Visit    1 month ago Cerumen debris on tympanic membrane of both ears    [facility] for David Sanders MD    Office Visit    4 months ago Essential hypertension, benign    [facility], [address], Kandy Garcia MD    Office Visit    7 months ago Dementia with behavioral disturbance, unspecified dementia type [facility])    [facility], [address], Kandy Garcia MD    Office Visit    7 months ago Urinary frequency    [facility] for Erika See MD    Office Visit

## 2021-12-30 ENCOUNTER — NURSE TRIAGE (OUTPATIENT)
Dept: INTERNAL MEDICINE CLINIC | Facility: CLINIC | Age: 86
End: 2021-12-30

## 2021-12-30 ENCOUNTER — HOSPITAL ENCOUNTER (EMERGENCY)
Facility: HOSPITAL | Age: 86
Discharge: HOME | End: 2021-12-30
Attending: EMERGENCY MEDICINE
Payer: MEDICARE

## 2021-12-30 ENCOUNTER — APPOINTMENT (OUTPATIENT)
Dept: GENERAL RADIOLOGY | Facility: HOSPITAL | Age: 86
End: 2021-12-30
Attending: EMERGENCY MEDICINE
Payer: MEDICARE

## 2021-12-30 VITALS
DIASTOLIC BLOOD PRESSURE: 71 MMHG | HEART RATE: 80 BPM | HEIGHT: 65 IN | BODY MASS INDEX: 33.32 KG/M2 | SYSTOLIC BLOOD PRESSURE: 116 MMHG | TEMPERATURE: 100 F | OXYGEN SATURATION: 95 % | WEIGHT: 200 LBS | RESPIRATION RATE: 18 BRPM

## 2021-12-30 DIAGNOSIS — M54.9 BACK PAIN WITHOUT RADIATION: Primary | ICD-10-CM

## 2021-12-30 DIAGNOSIS — E03.9 HYPOTHYROIDISM: ICD-10-CM

## 2021-12-30 DIAGNOSIS — F03.90 DEMENTIA (HCC): ICD-10-CM

## 2021-12-30 DIAGNOSIS — R41.3 MEMORY LOSS: ICD-10-CM

## 2021-12-30 PROCEDURE — 99283 EMERGENCY DEPT VISIT LOW MDM: CPT

## 2021-12-30 PROCEDURE — 72100 X-RAY EXAM L-S SPINE 2/3 VWS: CPT | Performed by: EMERGENCY MEDICINE

## 2021-12-30 RX ORDER — TRAMADOL HYDROCHLORIDE 50 MG/1
50 TABLET ORAL ONCE
Status: COMPLETED | OUTPATIENT
Start: 2021-12-30 | End: 2021-12-30

## 2021-12-30 RX ORDER — TRAMADOL HYDROCHLORIDE 50 MG/1
50 TABLET ORAL EVERY 6 HOURS PRN
Qty: 10 TABLET | Refills: 0 | Status: SHIPPED | OUTPATIENT
Start: 2021-12-30 | End: 2022-01-06

## 2021-12-30 RX ORDER — SIMVASTATIN 20 MG
20 TABLET ORAL NIGHTLY
Qty: 90 TABLET | Refills: 1 | Status: SHIPPED | OUTPATIENT
Start: 2021-12-30 | End: 2022-06-26

## 2021-12-30 RX ORDER — METHYLPREDNISOLONE 4 MG/1
TABLET ORAL
Qty: 1 EACH | Refills: 0 | Status: SHIPPED | OUTPATIENT
Start: 2021-12-30 | End: 2022-01-07

## 2021-12-30 NOTE — ED PROVIDER NOTES
Patient Seen in: [facility] AND [facility] Emergency Department      History   Patient presents with:  Back Pain    Stated Complaint: Back pain sent from Dr. Ladarius Grant:   HPI    51-year-old male with history of previous back surgery, here with complai Negative for dizziness. Psychiatric/Behavioral: Negative for suicidal ideas. All other systems reviewed and are negative. Positive for stated complaint: Back pain sent from Dr. Jacob Toro  Other systems are as noted in HPI.   Constitutional and vital s discussed above.     Dictated by (CST): Lindsey Walsh MD on 12/30/2021 at 12:50 PM     Finalized by (CST): Lindsey Walsh MD on 12/30/2021 at 12:53 PM              EMERGENCY DEPARTMENT COURSE AND TREATMENT:  Patient's condition was stable during Emergency Prescribed:  Current Discharge Medication List    START taking these medications    traMADol 50 MG Oral Tab  Take 1 tablet (50 mg total) by mouth every 6 (six) hours as needed.   Qty: 10 tablet Refills: 0    methylPREDNISolone (MEDROL) 4 MG Oral Tablet Ther

## 2021-12-30 NOTE — TELEPHONE ENCOUNTER
Action Requested: Summary for Provider     []  Critical Lab, Recommendations Needed  [] Need Additional Advice  [x]   FYI    []   Need Orders  [] Need Medications Sent to Pharmacy  []  Other     SUMMARY: Spoke to patient's daughter.  Daughter states patie

## 2021-12-30 NOTE — ED INITIAL ASSESSMENT (HPI)
Patient here with his wife who reports lower back pain and left hip pain for a few days. Denies any falls, but has been sitting in his lazy boy recliner for a while now. Taking XS tylenol without relief. Ambulatory with cane, but slowly.      Hx of dementia

## 2021-12-31 NOTE — TELEPHONE ENCOUNTER
Per my review of the chart, pt was seen in [facility] ED yesterday as advised. Pt has an appt with Dr. Maricarmen Santos scheduled for 1/13/22. Closing encounter.      ER NOTES  Clinical Impression:  Back pain without radiation  (primary encounter diagnosis)      Disposition

## 2022-01-04 ENCOUNTER — PATIENT MESSAGE (OUTPATIENT)
Dept: INTERNAL MEDICINE CLINIC | Facility: CLINIC | Age: 87
End: 2022-01-04

## 2022-01-05 ENCOUNTER — TELEPHONE (OUTPATIENT)
Dept: INTERNAL MEDICINE CLINIC | Facility: CLINIC | Age: 87
End: 2022-01-05

## 2022-01-05 RX ORDER — TRAMADOL HYDROCHLORIDE 50 MG/1
50 TABLET ORAL EVERY 6 HOURS PRN
Qty: 10 TABLET | Refills: 0 | OUTPATIENT
Start: 2022-01-05 | End: 2022-01-12

## 2022-01-05 NOTE — TELEPHONE ENCOUNTER
He just got 10 tablets on December 30 he should not be out that quickly. If he is having that many issues he needs to come in and be evaluated. He can see anyone.

## 2022-01-05 NOTE — TELEPHONE ENCOUNTER
[facility]-EOS message sent. Please triage patient.  ER visit 12/30.    ----- Message from Reid Fernandez sent at 1/5/2022  6:43 AM CST -----  Regarding: fallow up from ER visit  yes my father needs the Tramadol  thank you   Yayo Leyva.,

## 2022-01-05 NOTE — TELEPHONE ENCOUNTER
From: Jessa Mazariegos  To: Tommie Lieberman.  Barrera Benavides MD  Sent: 1/4/2022 11:13 AM CST  Subject: fallow up from ER visit    Good morning, my father was in the ER on 12-, the doctor gave him Methylprednisolone 4mg dosepk and Tramadol hcl 50 mg   I give my fathe

## 2022-01-05 NOTE — TELEPHONE ENCOUNTER
Routed to Dr Cali Powell for advise, thanks. Also Informaat message sent to pt, await reply.      Requested Prescriptions     Pending Prescriptions Disp Refills   • traMADol 50 MG Oral Tab 10 tablet 0     Sig: Take 1 tablet (50 mg total) by mouth every 6 (six) ho

## 2022-01-05 NOTE — TELEPHONE ENCOUNTER
Daughter called back and she stated that pt is doing well with the tramadol but by Saturday he will be out of tramadol and appt with  is not until 1/13 so she wanted to know if she can have more tramadol prescribed to pt.  I informed her of Dr. Luci Romero

## 2022-01-07 ENCOUNTER — OFFICE VISIT (OUTPATIENT)
Dept: INTERNAL MEDICINE CLINIC | Facility: CLINIC | Age: 87
End: 2022-01-07
Payer: COMMERCIAL

## 2022-01-07 VITALS
BODY MASS INDEX: 33.67 KG/M2 | SYSTOLIC BLOOD PRESSURE: 110 MMHG | DIASTOLIC BLOOD PRESSURE: 58 MMHG | HEART RATE: 85 BPM | OXYGEN SATURATION: 98 % | HEIGHT: 65 IN | WEIGHT: 202.13 LBS

## 2022-01-07 DIAGNOSIS — M54.50 CHRONIC BILATERAL LOW BACK PAIN WITHOUT SCIATICA: ICD-10-CM

## 2022-01-07 DIAGNOSIS — G89.29 CHRONIC BILATERAL LOW BACK PAIN WITHOUT SCIATICA: ICD-10-CM

## 2022-01-07 DIAGNOSIS — R26.9 GAIT DIFFICULTY: ICD-10-CM

## 2022-01-07 DIAGNOSIS — E78.2 MIXED HYPERLIPIDEMIA: Primary | ICD-10-CM

## 2022-01-07 DIAGNOSIS — I10 ESSENTIAL HYPERTENSION, BENIGN: ICD-10-CM

## 2022-01-07 LAB
APPEARANCE: CLEAR
BILIRUBIN: NEGATIVE
GLUCOSE (URINE DIPSTICK): NEGATIVE MG/DL
KETONES (URINE DIPSTICK): NEGATIVE MG/DL
LEUKOCYTES: NEGATIVE
MULTISTIX LOT#: NORMAL NUMERIC
NITRITE, URINE: NEGATIVE
OCCULT BLOOD: NEGATIVE
PH, URINE: 6.5 (ref 4.5–8)
PROTEIN (URINE DIPSTICK): NEGATIVE MG/DL
SPECIFIC GRAVITY: 1.01 (ref 1–1.03)
URINE-COLOR: YELLOW
UROBILINOGEN,SEMI-QN: 0.2 MG/DL (ref 0–1.9)

## 2022-01-07 PROCEDURE — 3074F SYST BP LT 130 MM HG: CPT | Performed by: NURSE PRACTITIONER

## 2022-01-07 PROCEDURE — 99214 OFFICE O/P EST MOD 30 MIN: CPT | Performed by: NURSE PRACTITIONER

## 2022-01-07 PROCEDURE — 3078F DIAST BP <80 MM HG: CPT | Performed by: NURSE PRACTITIONER

## 2022-01-07 PROCEDURE — 81003 URINALYSIS AUTO W/O SCOPE: CPT | Performed by: NURSE PRACTITIONER

## 2022-01-07 PROCEDURE — 3008F BODY MASS INDEX DOCD: CPT | Performed by: NURSE PRACTITIONER

## 2022-01-07 RX ORDER — TRAMADOL HYDROCHLORIDE 50 MG/1
50 TABLET ORAL NIGHTLY
Qty: 14 TABLET | Refills: 0 | Status: SHIPPED | OUTPATIENT
Start: 2022-01-07 | End: 2022-01-13

## 2022-01-07 RX ORDER — TRAMADOL HYDROCHLORIDE 50 MG/1
50 TABLET ORAL EVERY 6 HOURS PRN
COMMUNITY
End: 2022-01-07

## 2022-01-07 NOTE — PROGRESS NOTES
HPI:    Patient ID: Jessa Mazariegos is a 80year old male. Pt presents to the clinic w/ spouse. Has caregiver. Following up from [address] [place] [address] ER 12/30/2021 Dx w/ back pain. Started on Medrol Dosepak and tramadol. Spouse giving tramadol before bed.   Medrol Dosep TRIG 110 03/09/2021 09:12 AM    LDL 53 03/09/2021 09:12 AM    NONHDLC 75 03/09/2021 09:12 AM           Wt Readings from Last 3 Encounters:  01/07/22 : 202 lb 1.6 oz (91.7 kg)  12/30/21 : 200 lb (90.7 kg)  11/08/21 : 197 lb 12.8 oz (89.7 kg)    BP Readings mouth daily. 90 tablet 1   • QUEtiapine Fumarate 25 MG Oral Tab TAKE 1 TABLET BY MOUTH AT BEDTIME CAN GIVE ADDITIONAL TABLET AS NEEDED FOR AGITATION 180 tablet 0   • tamsulosin (FLOMAX) cap Take 1 capsule (0.4 mg total) by mouth daily.  90 capsule 1   • tri PHYSICAL EXAM:   /58 (BP Location: Left arm, Patient Position: Sitting, Cuff Size: large)   Pulse 85   Ht 5' 5\" (1.651 m)   Wt 202 lb 1.6 oz (91.7 kg)   SpO2 98%   BMI 33.63 kg/m²   BP Readings from Last 3 Encounters:  01/07/22 : 110/58  12/30/2 and neck supple. Lumbar back: Tenderness and bony tenderness present. No swelling, edema or spasms. Negative right straight leg raise test and negative left straight leg raise test.   Skin:     General: Skin is warm and dry.    Neurological:      Menta

## 2022-01-07 NOTE — PATIENT INSTRUCTIONS
ASSESSMENT/PLAN:   Mixed hyperlipidemia  (primary encounter diagnosis)  Check blood    Essential hypertension, benign  Careful with diet and excercise at least 30 minutes 3-4 times a week. Check blood pressures at different times on different days.  Can p

## 2022-01-13 ENCOUNTER — TELEPHONE (OUTPATIENT)
Dept: INTERNAL MEDICINE CLINIC | Facility: CLINIC | Age: 87
End: 2022-01-13

## 2022-01-13 ENCOUNTER — OFFICE VISIT (OUTPATIENT)
Dept: INTERNAL MEDICINE CLINIC | Facility: CLINIC | Age: 87
End: 2022-01-13
Payer: COMMERCIAL

## 2022-01-13 VITALS
SYSTOLIC BLOOD PRESSURE: 119 MMHG | TEMPERATURE: 99 F | OXYGEN SATURATION: 97 % | WEIGHT: 204.19 LBS | HEART RATE: 68 BPM | RESPIRATION RATE: 17 BRPM | HEIGHT: 65 IN | BODY MASS INDEX: 34.02 KG/M2 | DIASTOLIC BLOOD PRESSURE: 68 MMHG

## 2022-01-13 DIAGNOSIS — D22.9 ATYPICAL MOLE: ICD-10-CM

## 2022-01-13 DIAGNOSIS — R26.9 GAIT DIFFICULTY: ICD-10-CM

## 2022-01-13 DIAGNOSIS — E03.9 ACQUIRED HYPOTHYROIDISM: Primary | ICD-10-CM

## 2022-01-13 DIAGNOSIS — Z71.85 VACCINE COUNSELING: ICD-10-CM

## 2022-01-13 DIAGNOSIS — Z00.00 ENCOUNTER FOR ANNUAL HEALTH EXAMINATION: ICD-10-CM

## 2022-01-13 DIAGNOSIS — F03.91 DEMENTIA WITH BEHAVIORAL DISTURBANCE, UNSPECIFIED DEMENTIA TYPE (HCC): ICD-10-CM

## 2022-01-13 DIAGNOSIS — E78.2 MIXED HYPERLIPIDEMIA: ICD-10-CM

## 2022-01-13 DIAGNOSIS — I10 ESSENTIAL HYPERTENSION, BENIGN: ICD-10-CM

## 2022-01-13 LAB
ALBUMIN SERPL-MCNC: 3.2 G/DL (ref 3.4–5)
ALBUMIN/GLOB SERPL: 1 {RATIO} (ref 1–2)
ALP LIVER SERPL-CCNC: 76 U/L
ALT SERPL-CCNC: 31 U/L
ANION GAP SERPL CALC-SCNC: 5 MMOL/L (ref 0–18)
AST SERPL-CCNC: 12 U/L (ref 15–37)
BILIRUB SERPL-MCNC: 0.4 MG/DL (ref 0.1–2)
BUN BLD-MCNC: 15 MG/DL (ref 7–18)
BUN/CREAT SERPL: 13.2 (ref 10–20)
CALCIUM BLD-MCNC: 8.8 MG/DL (ref 8.5–10.1)
CHLORIDE SERPL-SCNC: 106 MMOL/L (ref 98–112)
CHOLEST SERPL-MCNC: 125 MG/DL (ref ?–200)
CO2 SERPL-SCNC: 29 MMOL/L (ref 21–32)
CREAT BLD-MCNC: 1.14 MG/DL
CRP SERPL-MCNC: 0.54 MG/DL (ref ?–0.3)
ERYTHROCYTE [SEDIMENTATION RATE] IN BLOOD: 10 MM/HR
FASTING PATIENT LIPID ANSWER: YES
FASTING STATUS PATIENT QL REPORTED: YES
GLOBULIN PLAS-MCNC: 3.3 G/DL (ref 2.8–4.4)
GLUCOSE BLD-MCNC: 79 MG/DL (ref 70–99)
HDLC SERPL-MCNC: 51 MG/DL (ref 40–59)
LDLC SERPL CALC-MCNC: 43 MG/DL (ref ?–100)
NONHDLC SERPL-MCNC: 74 MG/DL (ref ?–130)
OSMOLALITY SERPL CALC.SUM OF ELEC: 290 MOSM/KG (ref 275–295)
POTASSIUM SERPL-SCNC: 4.6 MMOL/L (ref 3.5–5.1)
PROT SERPL-MCNC: 6.5 G/DL (ref 6.4–8.2)
SODIUM SERPL-SCNC: 140 MMOL/L (ref 136–145)
T4 FREE SERPL-MCNC: 1 NG/DL (ref 0.8–1.7)
TRIGL SERPL-MCNC: 196 MG/DL (ref 30–149)
TSI SER-ACNC: 2.36 MIU/ML (ref 0.36–3.74)
VLDLC SERPL CALC-MCNC: 27 MG/DL (ref 0–30)

## 2022-01-13 PROCEDURE — 36415 COLL VENOUS BLD VENIPUNCTURE: CPT | Performed by: INTERNAL MEDICINE

## 2022-01-13 PROCEDURE — 96160 PT-FOCUSED HLTH RISK ASSMT: CPT | Performed by: INTERNAL MEDICINE

## 2022-01-13 PROCEDURE — G0439 PPPS, SUBSEQ VISIT: HCPCS | Performed by: INTERNAL MEDICINE

## 2022-01-13 PROCEDURE — 3078F DIAST BP <80 MM HG: CPT | Performed by: INTERNAL MEDICINE

## 2022-01-13 PROCEDURE — 3008F BODY MASS INDEX DOCD: CPT | Performed by: INTERNAL MEDICINE

## 2022-01-13 PROCEDURE — 3074F SYST BP LT 130 MM HG: CPT | Performed by: INTERNAL MEDICINE

## 2022-01-13 PROCEDURE — 99397 PER PM REEVAL EST PAT 65+ YR: CPT | Performed by: INTERNAL MEDICINE

## 2022-01-13 RX ORDER — TRAMADOL HYDROCHLORIDE 50 MG/1
50 TABLET ORAL NIGHTLY
Qty: 30 TABLET | Refills: 0 | Status: SHIPPED | OUTPATIENT
Start: 2022-01-13

## 2022-01-13 NOTE — TELEPHONE ENCOUNTER
Tenzin Reveles Faxed referrals, last office visit notes, face sheet and insurance card to [facility] @[phone]

## 2022-01-13 NOTE — PROGRESS NOTES
HPI:    Patient ID: Rayna Lopez is a 80year old male. Rayna Lopez is a 80year old male who presents for a complete physical exam.  He is here with his daughter Keegan Metcalf.    HPI:     Wt Readings from Last 3 Encounters:  01/13/22 : 204 lb 3.2 oz (92.6 k needed for Pain. • Vitamin D3 (VITAMIN D3) 2000 UNITS Oral Cap Take 2,000 Units by mouth daily. • Vitamin B-12 (VITAMIN B12) 1000 MCG Oral Tab Take 1,000 mcg by mouth daily.         Past Medical History:   Diagnosis Date   • Acid reflux    • Catarac exercise, low fat diet, The patient indicates understanding of these issues and agrees to the plan. The patient is asked to return for annual physical in 1 year. Hypertension  Patient is here for follow up of hypertension. BP at home: not check.    Has HDL 51 03/09/2021 09:12 AM    TRIG 110 03/09/2021 09:12 AM    LDL 53 03/09/2021 09:12 AM    NONHDLC 75 03/09/2021 09:12 AM          Tramadol has helped. Walking better. Only at bedtime that night to go to the bathroom. takes tramadol.  Does not get up at constipation, diarrhea, nausea, rectal pain and vomiting. Endocrine: Negative for cold intolerance, heat intolerance, polydipsia, polyphagia and polyuria. Genitourinary: Negative.   Negative for decreased urine volume, difficulty urinating, dysuria, fla acetonide 0.1 % External Cream Apply topically 2 (two) times daily. • aspirin 81 MG Oral Tab Take 81 mg by mouth daily. • acetaminophen (TYLENOL EXTRA STRENGTH) 500 MG Oral Tab Take 500 mg by mouth every 6 (six) hours as needed for Pain.      • Mariela Encounters:  01/13/22 : 119/68  01/07/22 : 110/58  12/30/21 : 116/71    Wt Readings from Last 3 Encounters:  01/13/22 : 204 lb 3.2 oz (92.6 kg)  01/07/22 : 202 lb 1.6 oz (91.7 kg)  12/30/21 : 200 lb (90.7 kg)      Physical Exam  Vitals and nursing note rev Normal extraocular motion and no nystagmus. Conjunctiva/sclera: Conjunctivae normal.      Right eye: Right conjunctiva is not injected. No chemosis, exudate or hemorrhage. Left eye: Left conjunctiva is not injected.  No chemosis, exudate or hemorrh Right side of head: No submental, submandibular, preauricular, posterior auricular or occipital adenopathy. Left side of head: No submental, submandibular, preauricular, posterior auricular or occipital adenopathy.       Cervical: No cervical frankie falling?: Yes  Have you fallen in the past year?: No      Cognitive Assessment     What day of the week is this?: Incorrect  What month is it?: Incorrect  What year is it?: Incorrect  Recall \"Ball\": Correct  Recall \"Flag\": Correct  Recall \"Tree\": Cor Advanced Directive:  He has a Living Will on file in [place]. He does NOT have a Power of  for [employer] on file in [place].    Advance care planning including the explanation and discussion of advance directives standard forms performed Face to [facility] 07/19/2017        ALLERGIES:   He is allergic to pcn [penicillins]. CURRENT MEDICATIONS:   traMADol 50 MG Oral Tab, Take 1 tablet (50 mg total) by mouth at bedtime. simvastatin 20 MG Oral Tab, Take 1 tablet (20 mg total) by mouth nightly.   omeprazole 2 drugs.     EXAM:   /68 (BP Location: Right arm, Patient Position: Sitting, Cuff Size: adult)   Pulse 68   Temp 98.8 °F (37.1 °C) (Oral)   Resp 17   Ht 5' 5\" (1.651 m)   Wt 204 lb 3.2 oz (92.6 kg)   SpO2 97%   BMI 33.98 kg/m²   Estimated body mass in Vaccination History     Immunization History   Administered Date(s) Administered   • Covid-19 Vaccine Pfizer 30 mcg/0.3 ml 02/12/2021, 03/05/2021, 10/14/2021   • FLU VAC High Dose 65 YRS & Older PRSV Free (84744) 09/26/2018, 10/01/2019, 10/21/2020, 11/ when taking meds. Careful with addiction potential for these drugs. Discussed with patient of side effects and use of these medications. Atypical mole R ear top.   -     DERM - INTERNAL  DW pt. And daughter of LINDSAY's mole check.      RTC 6 months for FU (EKG)   Covered if needed at Welcome to Medicare, and non-screening if indicated for medical reasons 02/15/2017      Ultrasound Screening for Abdominal Aortic Aneurysm (AAA) Covered once in a lifetime for one of the following risk factors   • Men who are 6

## 2022-01-13 NOTE — PATIENT INSTRUCTIONS
PLAN SUMMARY:   Diagnoses and all orders for this visit:    Acquired hypothyroidism  -     TSH W REFLEX TO FREE T4; Future  -     FREE T4 (FREE THYROXINE); Future    Encounter for annual health examination Urine clear. Mixed hyperlipidemia Check blood. cancer, especially Hodgkin disease or lymphoma  · Take medicines that weaken your immune system  What are the symptoms of shingles? 1. The first sign of shingles is often pain, burning, tingling, or itching on one part of your face or body.  You may also f stronger pain medicines. What are possible complications of shingles? Shingles often goes away with no lasting effects. But some people have complications during or after the infection comes out:  · Postherpetic neuralgia.  This is the most common complic getting chemotherapy for cancer, people who have had organ transplants, or people with HIV infections), particularly if they have never had chicken pox.   The shingles vaccine  Two shingles vaccines are available to help prevent shingles or make it less lauren pediatrician regarding the use of this medicine in children. This medicine is not approved for use in children. What side effects may I notice from receiving this medicine?   Side effects that you should report to your doctor or health care professional as [address] Agu[address]re SCREENING SCHEDULE   Tests on this list are recommended by your physician but may not be covered, or covered at this frequency, by your insurer.    Please check with your insurance carrier before scheduling to verify time    Pneumococcal Each vaccine (Iewwedc60 & Ustsbebub92) covered once after 65 Prevnar 13: 10/01/2019    Fodvvjtrl80: 07/08/2021     No recommendations at this time    Hepatitis B One screening covered for patients with certain risk factors   -  No jorge

## 2022-01-14 ENCOUNTER — TELEPHONE (OUTPATIENT)
Dept: INTERNAL MEDICINE CLINIC | Facility: CLINIC | Age: 87
End: 2022-01-14

## 2022-01-18 NOTE — TELEPHONE ENCOUNTER
Message left for pt's daughter to call the office for a list of [facility] agency's for her dad.

## 2022-01-18 NOTE — TELEPHONE ENCOUNTER
Can we call daughter and give her the information she can make a decision which 1 and then we will need to send the orders to that agency.

## 2022-01-18 NOTE — TELEPHONE ENCOUNTER
Spoke with Res  about other [facility] agency's that accept pt insurance [facility]zaiah Reese [facility] Ph# [phone]  Fax# [phone]. Spoke with patient's insurance and they gave several agency's.     [facility]  Ph# [phone]    Dayna Garcia [facility] Ph# [phone]    t

## 2022-01-31 DIAGNOSIS — R41.3 MEMORY LOSS: ICD-10-CM

## 2022-01-31 DIAGNOSIS — F03.90 DEMENTIA (HCC): ICD-10-CM

## 2022-01-31 DIAGNOSIS — E03.9 HYPOTHYROIDISM: ICD-10-CM

## 2022-01-31 RX ORDER — TAMSULOSIN HYDROCHLORIDE 0.4 MG/1
0.4 CAPSULE ORAL DAILY
Qty: 90 CAPSULE | Refills: 1 | Status: SHIPPED | OUTPATIENT
Start: 2022-01-31 | End: 2022-07-28

## 2022-01-31 NOTE — TELEPHONE ENCOUNTER
Please review; protocol failed/No Protcol    Requested Prescriptions   Pending Prescriptions Disp Refills    tamsulosin (FLOMAX) cap [Pharmacy Med Name: TAMSULOSIN HCL 0.4 MG CAPSULE] 90 capsule 1     Sig: TAKE 1 CAPSULE BY MOUTH EVERY DAY        There is no refill protocol information for this order           Recent Outpatient Visits              2 weeks ago Acquired hypothyroidism    [address] [place], [address], Anni Mata MD    Office Visit    3 weeks ago Mixed hyperlipidemia    [facility], [facility], [facility], APRN    Office Visit    2 months ago Acquired hypothyroidism    [address] [place], [address], Anni Mata MD    Office Visit    4 months ago Cerumen debris on tympanic membrane of both ears    [employer] for Charma Drowkylee, MD    Office Visit    6 months ago Essential hypertension, benign    [address] [place], [address], Asa Avery, MD    Office Visit            Future Appointments         Provider Department Appt Notes    In 3 days Amelia Adams, [address] [facility], [place]-Physiatry Bilateral low back pain without sciatica    In 5 months Pipo Pollack MD [address] [facility], [address] [facility]

## 2022-02-20 DIAGNOSIS — R41.3 MEMORY LOSS: ICD-10-CM

## 2022-02-20 DIAGNOSIS — F03.90 DEMENTIA (HCC): ICD-10-CM

## 2022-02-20 DIAGNOSIS — E03.9 HYPOTHYROIDISM: ICD-10-CM

## 2022-02-21 ENCOUNTER — TELEPHONE (OUTPATIENT)
Dept: INTERNAL MEDICINE CLINIC | Facility: CLINIC | Age: 87
End: 2022-02-21

## 2022-02-21 NOTE — TELEPHONE ENCOUNTER
Noted.  I think Dr. Chente Brady will be able to take care of that.   If not, please send it to [facility]

## 2022-02-21 NOTE — TELEPHONE ENCOUNTER
Noted. Daughter had verbalized understanding earlier to follow-up with our office if Dr. Newton Certain cannot fill.

## 2022-02-21 NOTE — TELEPHONE ENCOUNTER
Dr. Gume Snowden (for Dr. Randell Gaston) -   Okay to refill? See Dr. Radha Enriquez notes below, office visit. In 2/20/21 Refill Encounter - Rx pended there. Patient's daughter called office (on WILIAN). Patient's date of birth and full name both confirmed. Will see Dr. Vamsi Leone tomorrow for back pain. Appointment with PM&R was delayed due to snow. Takes Tramadol 50 mg for back, 1 tablet at night only for pain. Has one pill left for tonight. Daughter asking if Dr. Vamsi Leone will refill Tramdadol instead of Dr. Randell Gaston. RN informed her that Dr. Vamsi Leone can address this at appointment tomorrow. She verbalizes understanding and is agreeable to instructions. However, upon chart review, 1/13/22 Office Visit note with Dr. Randell Gaston:   \"Gait difficulty  -     PHYSICAL THERAPY EXTERNAL  -     [facility] per daughter. Pt. Know to get meds. From me ONLY. Also, patient knows not to take meds. and drive. Careful with grogginess when taking meds. Careful with addiction potential for these drugs. Discussed with patient of side effects and use of these medications.  \"    Future Appointments   Date Time Provider Kateryna Andino   2/22/2022  9:00 AM Ligia Rasheed, DO PM&R [facility]   7/14/2022 11:00 AM Ulises Madrid., MD [id number]  LNTG [id number]

## 2022-02-22 ENCOUNTER — OFFICE VISIT (OUTPATIENT)
Dept: PHYSICAL MEDICINE AND REHAB | Facility: CLINIC | Age: 87
End: 2022-02-22
Payer: COMMERCIAL

## 2022-02-22 VITALS
DIASTOLIC BLOOD PRESSURE: 54 MMHG | BODY MASS INDEX: 33.99 KG/M2 | HEIGHT: 65 IN | WEIGHT: 204 LBS | OXYGEN SATURATION: 94 % | HEART RATE: 85 BPM | SYSTOLIC BLOOD PRESSURE: 112 MMHG

## 2022-02-22 DIAGNOSIS — F03.91 DEMENTIA WITH BEHAVIORAL DISTURBANCE, UNSPECIFIED DEMENTIA TYPE (HCC): ICD-10-CM

## 2022-02-22 DIAGNOSIS — G89.29 CHRONIC BILATERAL LOW BACK PAIN WITHOUT SCIATICA: Primary | ICD-10-CM

## 2022-02-22 DIAGNOSIS — M54.50 CHRONIC BILATERAL LOW BACK PAIN WITHOUT SCIATICA: Primary | ICD-10-CM

## 2022-02-22 DIAGNOSIS — M21.372 ACQUIRED LEFT FOOT DROP: ICD-10-CM

## 2022-02-22 PROCEDURE — 3008F BODY MASS INDEX DOCD: CPT | Performed by: PHYSICAL MEDICINE & REHABILITATION

## 2022-02-22 PROCEDURE — 99204 OFFICE O/P NEW MOD 45 MIN: CPT | Performed by: PHYSICAL MEDICINE & REHABILITATION

## 2022-02-22 PROCEDURE — 3074F SYST BP LT 130 MM HG: CPT | Performed by: PHYSICAL MEDICINE & REHABILITATION

## 2022-02-22 PROCEDURE — 3078F DIAST BP <80 MM HG: CPT | Performed by: PHYSICAL MEDICINE & REHABILITATION

## 2022-02-22 RX ORDER — TRAMADOL HYDROCHLORIDE 50 MG/1
50 TABLET ORAL NIGHTLY PRN
Qty: 30 TABLET | Refills: 0 | Status: SHIPPED | OUTPATIENT
Start: 2022-02-22 | End: 2022-03-21

## 2022-02-22 RX ORDER — AMLODIPINE BESYLATE 5 MG/1
TABLET ORAL
Qty: 90 TABLET | Refills: 1 | Status: SHIPPED | OUTPATIENT
Start: 2022-02-22 | End: 2022-05-17

## 2022-02-22 RX ORDER — TRAMADOL HYDROCHLORIDE 50 MG/1
50 TABLET ORAL NIGHTLY PRN
Qty: 30 TABLET | Refills: 0 | Status: CANCELLED | OUTPATIENT
Start: 2022-02-22

## 2022-02-22 RX ORDER — TRAMADOL HYDROCHLORIDE 50 MG/1
50 TABLET ORAL EVERY 6 HOURS PRN
Refills: 0 | OUTPATIENT
Start: 2022-02-22

## 2022-02-22 NOTE — TELEPHONE ENCOUNTER
Refill passed per [employer] [facility] protocol. Chart reviewed. Duplicate request for Tramadol. Dr. Fide Stock approved refill today 2/22/22    Requested Prescriptions   Pending Prescriptions Disp Refills    AMLODIPINE 5 MG Oral Tab [Pharmacy Med Name: AMLODIPINE BESYLATE 5 MG TAB] 90 tablet 1     Sig: TAKE 1 TABLET BY MOUTH EVERY DAY        Hypertensive Medications Protocol Passed - 2/21/2022  9:19 AM        Passed - CMP or BMP in past 12 months        Passed - Appointment in past 6 or next 3 months        Passed - GFR Non- > 50     Lab Results   Component Value Date    GFRNAA 56 (L) 01/13/2022                    traMADol 50 MG Oral Tab [Pharmacy Med Name: TRAMADOL HCL 50 MG TABLET]  0     Sig: Take 1 tablet (50 mg total) by mouth every 6 (six) hours as needed for Pain. There is no refill protocol information for this order           Recent Outpatient Visits              1 month ago Acquired hypothyroidism    [facility], [address], Madeline Fernández MD    Office Visit    1 month ago Mixed hyperlipidemia    [facility], [address], Jana Choe APRN    Office Visit    3 months ago Acquired hypothyroidism    [facility], [address], Madeline Fernández MD    Office Visit    4 months ago Cerumen debris on tympanic membrane of both ears    [facility], [address], Morales Crisostomo MD    Office Visit    7 months ago Essential hypertension, benign    [facility], [address], Anisa Avery Cha., MD    Office Visit            Future Appointments         Provider Department Appt Notes    In 4 weeks Gennaro Patel, [address] [facility], [place]-Physiatry Return in about 4 weeks (around 3/22/2022).     In 4 months Bhavana Chang MD [facility], [facility], [facility]

## 2022-02-22 NOTE — H&P
History and Physical    C/C: chronic low back pain, left leg weakness    HPI: 80year old male presents with chronic low back pain. Was working until age 79, when he began having severe low back pain that was unresponsive to conservative therapy. He ultimately underwent two lumbar surgeries, the second one in 2008 which seemed to help. His daughter gives his history; he has dementia. His daughter is his primary caregiver, and she noticed that last year he seemed to develop a left foot drop. 2 months ago ended up in the ER due to excruciating pain. Was having difficulty transitioning from sit-stand, similar symptoms to what he experienced in 2008. Daughter has been giving him tramadol at night time, which seems to help. He has almost fallen a few times. Daughter states she can see that he has a foot drop when he walks. Uses a cane outside the household. XR lumbar spine done in ER. Daughter states he has problems with short term memory, and is not able to verbalize how much pain he has unless he tells her right away. Daughter lives on second floor, usually spends the night with him. They have a caregiver who comes during the daytime 4 days a week. With regards to his current level of function he is able to put on socks using a sock aid; his daughter lays out his clothes in the morning and he is able to dress himself. He does require supervision throughout the day.      Allergies: PCN    Patient-reported ROS  Constitutional  Sleep Disturbance: denies  Chills: denies  Fever: denies  Weight Gain: denies  Weight Loss: denies   Cardiovascular  Chest Pain: denies  Irregular Heartbeat: denies   Respiratory  Painful Breathing: denies  Wheezing: denies   Gastrointestinal  Bowel Incontinence: denies  Heartburn: denies  Abdominal Pain: denies  Blood in Stool : denies  Rectal Pain: denies   Hematology  Easy Bruising: denies  Easy Bleeding: denies   Genitourinary  Difficulty Urinating: denies  Bladder Incontinence: denies  Pelvic Pain: denies  Painful Urination: denies   Musculoskeletal  Joint Stiffness: admits  Painful Joints: admits  Tailbone Pain: admits  Swollen Joints: admits   Peripheral Vascular  Swelling of Legs/Feet: denies  Cold Extremities: denies   Skin  Open Sores: denies  Nodules or Lumps: denies  Rash: denies   Neurological  Loss of Strength Since last Visit: denies  Tingling/Numbness: admits  Balance: admits   Psychiatric  Anxiety: admits  Depressed Mood: admits     Physical exam:  BMI 33.95  /54  HR 85  O2 sat 94%    Lumbar spine exam:    ROM deferred  No tenderness to palpation lumbar paraspinals. no ttp PSIS. Difficulty following instructions to manually test strength. He appears to have symmetric strength in bilateral lower extremities. He does not have an appreciable foot drop/slap when ambulating within the exam room  1/4 quad, gastrocs reflexes b/l  Seated slump test negative  SLR negative bilaterally    Imaging: XR lumbar spine independently reviewed. 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 Assessment and plan:  1. Chronic low back pain  2. ? Acquired left foot drop  3. History of dementia    We discussed the goals of treatment today, which are essentially to keep him as functional as possible and to control his pain. We discussed that an MRI of the lumbar spine can be done for further evaluation of questionable left foot drop, but it is probably better to focus on maintaining his current level of function and control pain rather than proceeding with further diagnostic work-up that he may not be able to tolerate. It is unclear whether he would be able to tolerate lying flat for the duration required to obtain a quality MRI. We can consider MRI in the future should the left leg weakness worsen, or if pain becomes progressive.     In order to maintain his current level of function I did order home health physical therapy for assessment, to focus on maintaining his current level of ADLs, and for gait training. Refilled tramadol 50 mg nightly. We discussed increasing the dose during the daytime, but his daughter states that he seems to be doing reasonably well and she has not needed to give it to him during the daytime. He will follow up in 4 weeks.     Jeanne Engle DO  Physical Medicine and [address]

## 2022-02-24 ENCOUNTER — MED REC SCAN ONLY (OUTPATIENT)
Dept: NEUROLOGY | Facility: CLINIC | Age: 87
End: 2022-02-24

## 2022-03-16 ENCOUNTER — PATIENT MESSAGE (OUTPATIENT)
Dept: PHYSICAL MEDICINE AND REHAB | Facility: CLINIC | Age: 87
End: 2022-03-16

## 2022-03-16 NOTE — TELEPHONE ENCOUNTER
From: Tasha Rod  To:  Luis Quigley DO  Sent: 3/16/2022 2:18 PM CDT  Subject: home therapy    I called home health care and they tell me they do not take my father's insurance could you give me another referral?  Blake Paris (daughter)

## 2022-03-21 RX ORDER — TRAMADOL HYDROCHLORIDE 50 MG/1
TABLET ORAL
Qty: 30 TABLET | Refills: 0 | Status: SHIPPED | OUTPATIENT
Start: 2022-03-21

## 2022-03-21 NOTE — TELEPHONE ENCOUNTER
Please either refer him to another home health PT company or have them contact their insurance to find another home health company.

## 2022-04-07 ENCOUNTER — TELEPHONE (OUTPATIENT)
Dept: NEUROLOGY | Facility: CLINIC | Age: 87
End: 2022-04-07

## 2022-04-07 NOTE — TELEPHONE ENCOUNTER
Pt emergency contact calling to request new PT orders, this should be Outpatients to [address] [facility]. Keeley Jaramillo

## 2022-04-11 ENCOUNTER — TELEPHONE (OUTPATIENT)
Dept: PHYSICAL MEDICINE AND REHAB | Facility: CLINIC | Age: 87
End: 2022-04-11

## 2022-04-13 ENCOUNTER — TELEPHONE (OUTPATIENT)
Dept: PHYSICAL THERAPY | Facility: HOSPITAL | Age: 87
End: 2022-04-13

## 2022-04-15 ENCOUNTER — OFFICE VISIT (OUTPATIENT)
Dept: INTERNAL MEDICINE CLINIC | Facility: CLINIC | Age: 87
End: 2022-04-15
Payer: COMMERCIAL

## 2022-04-15 VITALS
HEIGHT: 65 IN | WEIGHT: 206 LBS | RESPIRATION RATE: 17 BRPM | DIASTOLIC BLOOD PRESSURE: 66 MMHG | TEMPERATURE: 98 F | BODY MASS INDEX: 34.32 KG/M2 | HEART RATE: 76 BPM | OXYGEN SATURATION: 99 % | SYSTOLIC BLOOD PRESSURE: 144 MMHG

## 2022-04-15 DIAGNOSIS — R21 RASH: Primary | ICD-10-CM

## 2022-04-15 PROCEDURE — 3077F SYST BP >= 140 MM HG: CPT | Performed by: INTERNAL MEDICINE

## 2022-04-15 PROCEDURE — 3078F DIAST BP <80 MM HG: CPT | Performed by: INTERNAL MEDICINE

## 2022-04-15 PROCEDURE — 99213 OFFICE O/P EST LOW 20 MIN: CPT | Performed by: INTERNAL MEDICINE

## 2022-04-15 PROCEDURE — 3008F BODY MASS INDEX DOCD: CPT | Performed by: INTERNAL MEDICINE

## 2022-04-17 DIAGNOSIS — R41.3 MEMORY LOSS: ICD-10-CM

## 2022-04-17 DIAGNOSIS — E03.9 HYPOTHYROIDISM: ICD-10-CM

## 2022-04-17 DIAGNOSIS — F03.90 DEMENTIA (HCC): ICD-10-CM

## 2022-04-18 ENCOUNTER — OFFICE VISIT (OUTPATIENT)
Dept: PHYSICAL THERAPY | Age: 87
End: 2022-04-18
Attending: PHYSICAL MEDICINE & REHABILITATION
Payer: MEDICARE

## 2022-04-18 DIAGNOSIS — G89.29 CHRONIC BILATERAL LOW BACK PAIN WITHOUT SCIATICA: ICD-10-CM

## 2022-04-18 DIAGNOSIS — M21.372 ACQUIRED LEFT FOOT DROP: ICD-10-CM

## 2022-04-18 DIAGNOSIS — M54.50 CHRONIC BILATERAL LOW BACK PAIN WITHOUT SCIATICA: ICD-10-CM

## 2022-04-18 PROCEDURE — 97110 THERAPEUTIC EXERCISES: CPT

## 2022-04-18 PROCEDURE — 97162 PT EVAL MOD COMPLEX 30 MIN: CPT

## 2022-04-18 RX ORDER — FINASTERIDE 5 MG/1
5 TABLET, FILM COATED ORAL DAILY
Qty: 90 TABLET | Refills: 1 | Status: SHIPPED | OUTPATIENT
Start: 2022-04-18 | End: 2022-10-16

## 2022-04-20 RX ORDER — TRAMADOL HYDROCHLORIDE 50 MG/1
50 TABLET ORAL NIGHTLY
Qty: 30 TABLET | Refills: 0 | OUTPATIENT
Start: 2022-04-20

## 2022-04-20 NOTE — TELEPHONE ENCOUNTER
Please review. Protocol failed or has no protocol.     Requested Prescriptions   Pending Prescriptions Disp Refills    TRAMADOL 50 MG Oral Tab [Pharmacy Med Name: TRAMADOL HCL 50 MG TABLET] 14 tablet 0     Sig: TAKE 1 TABLET BY MOUTH EVERYDAY AT BEDTIME        There is no refill protocol information for this order           Recent Outpatient Visits              2 days ago Chronic bilateral low back pain without sciatica    [address], Melia Bradshaw    Office Visit    5 days ago [facility], Sena Yancey MD    Office Visit    1 month ago Chronic bilateral low back pain without sciatica    [facility]    Office Visit    3 months ago Acquired hypothyroidism    [address], [place], Paulette Hassan MD    Office Visit    3 months ago Mixed hyperlipidemia    [address] [facility]    Office Visit            Future Appointments         Provider Department Appt Notes    Tomorrow [address] [address] [place], [facility]  c/p $40, no auth, no limit    In 5 days [address] [place], [address] [place] [address]  c/p $40, no auth, no limit    In 1 week [address] [place], [address] [facility]  c/p $40, no auth, no limit    In 2 weeks [place] [place],  [facility]  c/p $40, no auth, no limit    In 2 weeks [address] [place], [address] [place] [address]  c/p $40, no auth, no limit    In 3 weeks [place] [place], [address] [place] [address]  c/p $40, no auth, no limit    In 4 weeks [address] [address] [place], [address] [place] [address] [place] [facility]  c/p $40, no auth, no limit    In 1 month Argelia Hyatt, [address] [place] [address]  c/p $40, no auth, no limit    In 2 months Jose Spain MD [facility], [facility], [address] [facility] [address]     In 3 months Mary Jones MD [facility] Dermatology NEW** SPOT OF CONCERN

## 2022-04-21 ENCOUNTER — OFFICE VISIT (OUTPATIENT)
Dept: PHYSICAL THERAPY | Age: 87
End: 2022-04-21
Attending: PHYSICAL MEDICINE & REHABILITATION
Payer: MEDICARE

## 2022-04-21 ENCOUNTER — TELEPHONE (OUTPATIENT)
Dept: PHYSICAL MEDICINE AND REHAB | Facility: CLINIC | Age: 87
End: 2022-04-21

## 2022-04-21 PROCEDURE — 97110 THERAPEUTIC EXERCISES: CPT

## 2022-04-21 RX ORDER — TRAMADOL HYDROCHLORIDE 50 MG/1
TABLET ORAL
Qty: 30 TABLET | Refills: 0 | Status: SHIPPED | OUTPATIENT
Start: 2022-04-21

## 2022-04-21 NOTE — TELEPHONE ENCOUNTER
Spoke with pt's daughter per WILIAN, BEATRICE verified, she is returning our call. Pt's daughter was informed of MD recommendation, she stated understanding. Rx already filled by Dr Bryn Del Real.

## 2022-04-21 NOTE — TELEPHONE ENCOUNTER
Since he is seeing physical medicine rehab Dr. Matthew Gupta they will have to refill it. Only one physician can refill it.

## 2022-04-21 NOTE — TELEPHONE ENCOUNTER
Refill Request    Medication request: TRAMADOL 50 MG Oral Tab  TAKE 1 TABLET BY MOUTH NIGHTLY AS NEEDED FOR PAIN.    OBZ:8/62/7219 Michaelpadma [place], DO   Due back to clinic per last office note: \"He will follow up in 4 weeks. \"  NOV: Visit date not found      [place]: 3/21/22 #30    Urine drug screen (if applicable): n/a  Pain contract: n/a    LOV plan (if weaning or changing medications): Per  at LOV: \"Refilled tramadol 50 mg nightly. We discussed increasing the dose during the daytime, but his daughter states that he seems to be doing reasonably well and she has not needed to give it to him during the daytime. \"

## 2022-04-25 ENCOUNTER — APPOINTMENT (OUTPATIENT)
Dept: PHYSICAL THERAPY | Age: 87
End: 2022-04-25
Attending: PHYSICAL MEDICINE & REHABILITATION
Payer: MEDICARE

## 2022-04-27 ENCOUNTER — OFFICE VISIT (OUTPATIENT)
Dept: PHYSICAL THERAPY | Age: 87
End: 2022-04-27
Attending: PHYSICAL MEDICINE & REHABILITATION
Payer: MEDICARE

## 2022-04-27 PROCEDURE — 97110 THERAPEUTIC EXERCISES: CPT

## 2022-05-04 ENCOUNTER — OFFICE VISIT (OUTPATIENT)
Dept: PHYSICAL THERAPY | Age: 87
End: 2022-05-04
Attending: PHYSICAL MEDICINE & REHABILITATION
Payer: MEDICARE

## 2022-05-04 PROCEDURE — 97110 THERAPEUTIC EXERCISES: CPT

## 2022-05-10 ENCOUNTER — OFFICE VISIT (OUTPATIENT)
Dept: PHYSICAL THERAPY | Age: 87
End: 2022-05-10
Attending: PHYSICAL MEDICINE & REHABILITATION
Payer: MEDICARE

## 2022-05-10 PROCEDURE — 97110 THERAPEUTIC EXERCISES: CPT

## 2022-05-13 ENCOUNTER — NURSE TRIAGE (OUTPATIENT)
Dept: INTERNAL MEDICINE CLINIC | Facility: CLINIC | Age: 87
End: 2022-05-13

## 2022-05-14 ENCOUNTER — OFFICE VISIT (OUTPATIENT)
Dept: OTOLARYNGOLOGY | Facility: CLINIC | Age: 87
End: 2022-05-14
Payer: COMMERCIAL

## 2022-05-14 DIAGNOSIS — H61.23 CERUMEN DEBRIS ON TYMPANIC MEMBRANE OF BOTH EARS: Primary | ICD-10-CM

## 2022-05-14 PROCEDURE — 69210 REMOVE IMPACTED EAR WAX UNI: CPT | Performed by: SPECIALIST

## 2022-05-14 NOTE — TELEPHONE ENCOUNTER
Swelling was gone this morning. Will keep an eye on it. To call back or go to ER if s/s worsen of fail to improve as anticipated. Verbalized good understanding of all with intent to comply.

## 2022-05-17 ENCOUNTER — OFFICE VISIT (OUTPATIENT)
Dept: INTERNAL MEDICINE CLINIC | Facility: CLINIC | Age: 87
End: 2022-05-17
Payer: COMMERCIAL

## 2022-05-17 ENCOUNTER — OFFICE VISIT (OUTPATIENT)
Dept: PHYSICAL THERAPY | Age: 87
End: 2022-05-17
Attending: PHYSICAL MEDICINE & REHABILITATION
Payer: MEDICARE

## 2022-05-17 VITALS
BODY MASS INDEX: 33.66 KG/M2 | OXYGEN SATURATION: 98 % | RESPIRATION RATE: 17 BRPM | HEIGHT: 65 IN | DIASTOLIC BLOOD PRESSURE: 64 MMHG | TEMPERATURE: 98 F | WEIGHT: 202 LBS | HEART RATE: 72 BPM | SYSTOLIC BLOOD PRESSURE: 110 MMHG

## 2022-05-17 DIAGNOSIS — M25.474 BILATERAL SWELLING OF FEET AND ANKLES: Primary | ICD-10-CM

## 2022-05-17 DIAGNOSIS — M25.471 BILATERAL SWELLING OF FEET AND ANKLES: Primary | ICD-10-CM

## 2022-05-17 DIAGNOSIS — T88.7XXA MEDICATION SIDE EFFECTS: ICD-10-CM

## 2022-05-17 DIAGNOSIS — M25.472 BILATERAL SWELLING OF FEET AND ANKLES: Primary | ICD-10-CM

## 2022-05-17 DIAGNOSIS — M25.475 BILATERAL SWELLING OF FEET AND ANKLES: Primary | ICD-10-CM

## 2022-05-17 PROCEDURE — 3078F DIAST BP <80 MM HG: CPT | Performed by: INTERNAL MEDICINE

## 2022-05-17 PROCEDURE — 3008F BODY MASS INDEX DOCD: CPT | Performed by: INTERNAL MEDICINE

## 2022-05-17 PROCEDURE — 3074F SYST BP LT 130 MM HG: CPT | Performed by: INTERNAL MEDICINE

## 2022-05-17 PROCEDURE — 97110 THERAPEUTIC EXERCISES: CPT

## 2022-05-17 PROCEDURE — 99214 OFFICE O/P EST MOD 30 MIN: CPT | Performed by: INTERNAL MEDICINE

## 2022-05-17 RX ORDER — LOSARTAN POTASSIUM 25 MG/1
25 TABLET ORAL DAILY
Qty: 30 TABLET | Refills: 2 | Status: SHIPPED | OUTPATIENT
Start: 2022-05-17

## 2022-05-19 ENCOUNTER — OFFICE VISIT (OUTPATIENT)
Dept: PHYSICAL THERAPY | Age: 87
End: 2022-05-19
Attending: PHYSICAL MEDICINE & REHABILITATION
Payer: MEDICARE

## 2022-05-19 DIAGNOSIS — F03.90 DEMENTIA (HCC): ICD-10-CM

## 2022-05-19 DIAGNOSIS — E03.9 HYPOTHYROIDISM: ICD-10-CM

## 2022-05-19 DIAGNOSIS — R41.3 MEMORY LOSS: ICD-10-CM

## 2022-05-19 PROCEDURE — 97110 THERAPEUTIC EXERCISES: CPT

## 2022-05-19 RX ORDER — OMEPRAZOLE 20 MG/1
20 CAPSULE, DELAYED RELEASE ORAL DAILY
Qty: 90 CAPSULE | Refills: 1 | Status: SHIPPED | OUTPATIENT
Start: 2022-05-19 | End: 2022-11-15

## 2022-05-19 NOTE — TELEPHONE ENCOUNTER
Refill passed per [place] [employer] [facility] protocol.       Requested Prescriptions   Pending Prescriptions Disp Refills    OMEPRAZOLE 20 MG Oral Capsule Delayed Release [Pharmacy Med Name: OMEPRAZOLE DR 20 MG CAPSULE] 90 capsule 1     Sig: TAKE 1 CAPSULE BY MOUTH EVERY DAY        Gastrointestional Medication Protocol Passed - 5/19/2022 12:06 AM        Passed - Appointment in past 15 or next 3 months               Future Appointments         Provider Department Appt Notes    Today Lena Mai, PT [address] [place] [facility]  c/p $40, no auth, no limit    In 5 days Lena Mai, PT [address] [place]  c/p $40, no auth, no limit    In 1 week Chong Sawant, [address] [facility]-Physiatr check existing condition after receiving pt    In 1 week Lena Mai, [address] [place]     In 1 month Valery Acosta MD [facility], [facility]     In 2 months Kathleen Russo MD [facility] [place] Dermatology NEW** SPOT OF CONCERN            Recent Outpatient Visits              2 days ago Bilateral swelling of feet and ankles    [facility], [address], Shari Azul MD    Office Visit    2 days ago     [address] Lena Mai [place]    Office Visit    5 days ago Cerumen debris on tympanic membrane of both ears    [facility], [address], Gia Martin MD    Office Visit    1 week ago     [address] [place] Lena [place]    Office Visit    2 weeks ago     Bethany Meier [place]    Office Visit

## 2022-05-21 NOTE — TELEPHONE ENCOUNTER
Gregoria Lacy from [facility] called and stated due to patient's insurance [employer], they are unable to accept the referral and do not take that insurance.      # [phone] Level of Care Disposition: Discharge     Patient was seen by ED provider and [facility] AN AFFILIATE OF [facility] staff. The case was presented to psychiatric provider on-call Dr. Lito Lutz.   Based on the ED evaluation and information presented to the provider by LAURA Guzman it is the recommendation of the on call psychiatric provider that the patient be discharged from a psychiatric standpoint with the following referrals: [facility]                       LAURA Marquez  05/21/22 1050

## 2022-05-22 DIAGNOSIS — G89.29 CHRONIC BILATERAL LOW BACK PAIN WITHOUT SCIATICA: ICD-10-CM

## 2022-05-22 DIAGNOSIS — M21.372 ACQUIRED LEFT FOOT DROP: ICD-10-CM

## 2022-05-22 DIAGNOSIS — M54.50 CHRONIC BILATERAL LOW BACK PAIN WITHOUT SCIATICA: ICD-10-CM

## 2022-05-22 RX ORDER — TRAMADOL HYDROCHLORIDE 50 MG/1
50 TABLET ORAL NIGHTLY PRN
Qty: 30 TABLET | Refills: 0 | Status: CANCELLED | OUTPATIENT
Start: 2022-05-22

## 2022-05-23 RX ORDER — TRAMADOL HYDROCHLORIDE 50 MG/1
TABLET ORAL
Qty: 30 TABLET | Refills: 0 | Status: SHIPPED | OUTPATIENT
Start: 2022-05-23

## 2022-05-24 ENCOUNTER — OFFICE VISIT (OUTPATIENT)
Dept: PHYSICAL THERAPY | Age: 87
End: 2022-05-24
Attending: PHYSICAL MEDICINE & REHABILITATION
Payer: MEDICARE

## 2022-05-24 PROCEDURE — 97110 THERAPEUTIC EXERCISES: CPT

## 2022-05-31 ENCOUNTER — OFFICE VISIT (OUTPATIENT)
Dept: PHYSICAL MEDICINE AND REHAB | Facility: CLINIC | Age: 87
End: 2022-05-31
Payer: COMMERCIAL

## 2022-05-31 VITALS
HEIGHT: 65 IN | OXYGEN SATURATION: 97 % | BODY MASS INDEX: 33.66 KG/M2 | WEIGHT: 202 LBS | DIASTOLIC BLOOD PRESSURE: 60 MMHG | HEART RATE: 75 BPM | SYSTOLIC BLOOD PRESSURE: 118 MMHG

## 2022-05-31 DIAGNOSIS — G89.29 CHRONIC BILATERAL LOW BACK PAIN WITHOUT SCIATICA: Primary | ICD-10-CM

## 2022-05-31 DIAGNOSIS — M54.50 CHRONIC BILATERAL LOW BACK PAIN WITHOUT SCIATICA: Primary | ICD-10-CM

## 2022-05-31 PROCEDURE — 3008F BODY MASS INDEX DOCD: CPT | Performed by: PHYSICAL MEDICINE & REHABILITATION

## 2022-05-31 PROCEDURE — 3078F DIAST BP <80 MM HG: CPT | Performed by: PHYSICAL MEDICINE & REHABILITATION

## 2022-05-31 PROCEDURE — 99213 OFFICE O/P EST LOW 20 MIN: CPT | Performed by: PHYSICAL MEDICINE & REHABILITATION

## 2022-05-31 PROCEDURE — 3074F SYST BP LT 130 MM HG: CPT | Performed by: PHYSICAL MEDICINE & REHABILITATION

## 2022-06-01 ENCOUNTER — OFFICE VISIT (OUTPATIENT)
Dept: PHYSICAL THERAPY | Age: 87
End: 2022-06-01
Attending: PHYSICAL MEDICINE & REHABILITATION
Payer: MEDICARE

## 2022-06-01 PROCEDURE — 97110 THERAPEUTIC EXERCISES: CPT

## 2022-06-19 DIAGNOSIS — M54.50 CHRONIC BILATERAL LOW BACK PAIN WITHOUT SCIATICA: ICD-10-CM

## 2022-06-19 DIAGNOSIS — G89.29 CHRONIC BILATERAL LOW BACK PAIN WITHOUT SCIATICA: ICD-10-CM

## 2022-06-19 DIAGNOSIS — M21.372 ACQUIRED LEFT FOOT DROP: ICD-10-CM

## 2022-06-20 RX ORDER — TRAMADOL HYDROCHLORIDE 50 MG/1
50 TABLET ORAL NIGHTLY PRN
Qty: 30 TABLET | Refills: 0 | Status: SHIPPED | OUTPATIENT
Start: 2022-06-22 | End: 2022-07-17

## 2022-06-20 NOTE — TELEPHONE ENCOUNTER
Refill Request    Medication request: TRAMADOL 50 MG Oral Tab    LOV:5/31/2022 Marimar Alejandro DO   Due back to clinic per last office note:  3-4M  NOV: 8/4/2022 Marimar Alejandro DO      [place]: 5/23/22 #30    Urine drug screen (if applicable): None  Pain contract: None    LOV plan (if weaning or changing medications): Continue Tramadol at bedtime

## 2022-06-26 DIAGNOSIS — R41.3 MEMORY LOSS: ICD-10-CM

## 2022-06-26 DIAGNOSIS — F03.90 DEMENTIA (HCC): ICD-10-CM

## 2022-06-26 DIAGNOSIS — E03.9 HYPOTHYROIDISM: ICD-10-CM

## 2022-06-26 RX ORDER — SIMVASTATIN 20 MG
20 TABLET ORAL NIGHTLY
Qty: 90 TABLET | Refills: 1 | Status: SHIPPED | OUTPATIENT
Start: 2022-06-26 | End: 2022-12-18

## 2022-06-29 ENCOUNTER — APPOINTMENT (OUTPATIENT)
Dept: PHYSICAL THERAPY | Age: 87
End: 2022-06-29
Attending: INTERNAL MEDICINE
Payer: MEDICARE

## 2022-07-06 ENCOUNTER — TELEPHONE (OUTPATIENT)
Dept: INTERNAL MEDICINE CLINIC | Facility: CLINIC | Age: 87
End: 2022-07-06

## 2022-07-06 ENCOUNTER — OFFICE VISIT (OUTPATIENT)
Dept: PHYSICAL THERAPY | Age: 87
End: 2022-07-06
Attending: INTERNAL MEDICINE
Payer: MEDICARE

## 2022-07-06 PROCEDURE — 97110 THERAPEUTIC EXERCISES: CPT

## 2022-07-06 NOTE — TELEPHONE ENCOUNTER
Daughter came in to the office wanted to leave a message regarding her dad he is sleeping most of the day cant keep him up. He sleep very well during the night.  another symptom she has notice he has a bloated stomach no pain she was wondering if you can order blood test before their appointment next week

## 2022-07-06 NOTE — TELEPHONE ENCOUNTER
For this issue maybe he needs to come in and see someone ASAP may be urinary tract infection and infection. Would not wait until appointment.

## 2022-07-06 NOTE — TELEPHONE ENCOUNTER
Patient's daughter Lopez Fong (identified patient/), was informed of Dr Ashley Simms instructions. Future Appointments   Date Time Provider Kateryna Andino   2022 10:00 AM MAKENZIE Fermin [id number] [place] [id number]   2022 11:00 AM Jeanine Jesus MD [id number] [place] [address]   2022 11:30 AM Berenice Jones DO PM&R [facility]   2022 10:30 AM Areli Mello MD [facility]     Informed to wear a mask. Nereida verbalized understanding.

## 2022-07-07 ENCOUNTER — OFFICE VISIT (OUTPATIENT)
Dept: INTERNAL MEDICINE CLINIC | Facility: CLINIC | Age: 87
End: 2022-07-07
Payer: COMMERCIAL

## 2022-07-07 VITALS
HEART RATE: 67 BPM | HEIGHT: 65 IN | WEIGHT: 208.38 LBS | SYSTOLIC BLOOD PRESSURE: 130 MMHG | OXYGEN SATURATION: 99 % | DIASTOLIC BLOOD PRESSURE: 62 MMHG | BODY MASS INDEX: 34.72 KG/M2

## 2022-07-07 DIAGNOSIS — I10 ESSENTIAL HYPERTENSION, BENIGN: ICD-10-CM

## 2022-07-07 DIAGNOSIS — F03.91 DEMENTIA WITH BEHAVIORAL DISTURBANCE, UNSPECIFIED DEMENTIA TYPE (HCC): ICD-10-CM

## 2022-07-07 DIAGNOSIS — R53.83 FATIGUE, UNSPECIFIED TYPE: ICD-10-CM

## 2022-07-07 DIAGNOSIS — E78.2 MIXED HYPERLIPIDEMIA: ICD-10-CM

## 2022-07-07 DIAGNOSIS — R14.0 BLOATING: Primary | ICD-10-CM

## 2022-07-07 DIAGNOSIS — E03.9 ACQUIRED HYPOTHYROIDISM: ICD-10-CM

## 2022-07-07 LAB
ALBUMIN SERPL-MCNC: 3.2 G/DL (ref 3.4–5)
ALBUMIN/GLOB SERPL: 0.9 {RATIO} (ref 1–2)
ALP LIVER SERPL-CCNC: 62 U/L
ALT SERPL-CCNC: 35 U/L
ANION GAP SERPL CALC-SCNC: 8 MMOL/L (ref 0–18)
APPEARANCE: CLEAR
AST SERPL-CCNC: 16 U/L (ref 15–37)
BASOPHILS # BLD AUTO: 0.03 X10(3) UL (ref 0–0.2)
BASOPHILS NFR BLD AUTO: 0.4 %
BILIRUB SERPL-MCNC: 0.6 MG/DL (ref 0.1–2)
BILIRUB UR QL: NEGATIVE
BILIRUBIN: NEGATIVE
BUN BLD-MCNC: 20 MG/DL (ref 7–18)
BUN/CREAT SERPL: 15.5 (ref 10–20)
CALCIUM BLD-MCNC: 8.9 MG/DL (ref 8.5–10.1)
CHLORIDE SERPL-SCNC: 110 MMOL/L (ref 98–112)
CHOLEST SERPL-MCNC: 124 MG/DL (ref ?–200)
CLARITY UR: CLEAR
CO2 SERPL-SCNC: 24 MMOL/L (ref 21–32)
COLOR UR: YELLOW
CREAT BLD-MCNC: 1.29 MG/DL
DEPRECATED RDW RBC AUTO: 47.1 FL (ref 35.1–46.3)
EOSINOPHIL # BLD AUTO: 0.56 X10(3) UL (ref 0–0.7)
EOSINOPHIL NFR BLD AUTO: 6.9 %
ERYTHROCYTE [DISTWIDTH] IN BLOOD BY AUTOMATED COUNT: 13.7 % (ref 11–15)
FASTING PATIENT LIPID ANSWER: NO
FASTING STATUS PATIENT QL REPORTED: NO
GLOBULIN PLAS-MCNC: 3.7 G/DL (ref 2.8–4.4)
GLUCOSE (URINE DIPSTICK): NEGATIVE MG/DL
GLUCOSE BLD-MCNC: 109 MG/DL (ref 70–99)
GLUCOSE UR-MCNC: NEGATIVE MG/DL
HCT VFR BLD AUTO: 48.3 %
HDLC SERPL-MCNC: 41 MG/DL (ref 40–59)
HGB BLD-MCNC: 15 G/DL
HGB UR QL STRIP.AUTO: NEGATIVE
IMM GRANULOCYTES # BLD AUTO: 0.01 X10(3) UL (ref 0–1)
IMM GRANULOCYTES NFR BLD: 0.1 %
KETONES (URINE DIPSTICK): NEGATIVE MG/DL
KETONES UR-MCNC: NEGATIVE MG/DL
LDLC SERPL CALC-MCNC: 50 MG/DL (ref ?–100)
LEUKOCYTE ESTERASE UR QL STRIP.AUTO: NEGATIVE
LEUKOCYTES: NEGATIVE
LYMPHOCYTES # BLD AUTO: 2.39 X10(3) UL (ref 1–4)
LYMPHOCYTES NFR BLD AUTO: 29.4 %
MCH RBC QN AUTO: 29.1 PG (ref 26–34)
MCHC RBC AUTO-ENTMCNC: 31.1 G/DL (ref 31–37)
MCV RBC AUTO: 93.8 FL
MONOCYTES # BLD AUTO: 1 X10(3) UL (ref 0.1–1)
MONOCYTES NFR BLD AUTO: 12.3 %
MULTISTIX LOT#: NORMAL NUMERIC
NEUTROPHILS # BLD AUTO: 4.14 X10 (3) UL (ref 1.5–7.7)
NEUTROPHILS # BLD AUTO: 4.14 X10(3) UL (ref 1.5–7.7)
NEUTROPHILS NFR BLD AUTO: 50.9 %
NITRITE UR QL STRIP.AUTO: NEGATIVE
NITRITE, URINE: NEGATIVE
NONHDLC SERPL-MCNC: 83 MG/DL (ref ?–130)
OCCULT BLOOD: NEGATIVE
OSMOLALITY SERPL CALC.SUM OF ELEC: 297 MOSM/KG (ref 275–295)
PH UR: 7 [PH] (ref 5–8)
PH, URINE: 6 (ref 4.5–8)
PLATELET # BLD AUTO: 160 10(3)UL (ref 150–450)
POTASSIUM SERPL-SCNC: 4.3 MMOL/L (ref 3.5–5.1)
PROT SERPL-MCNC: 6.9 G/DL (ref 6.4–8.2)
PROT UR-MCNC: NEGATIVE MG/DL
PROTEIN (URINE DIPSTICK): NEGATIVE MG/DL
RBC # BLD AUTO: 5.15 X10(6)UL
SODIUM SERPL-SCNC: 142 MMOL/L (ref 136–145)
SP GR UR STRIP: 1.01 (ref 1–1.03)
SPECIFIC GRAVITY: 1 (ref 1–1.03)
TRIGL SERPL-MCNC: 201 MG/DL (ref 30–149)
TSI SER-ACNC: 3.44 MIU/ML (ref 0.36–3.74)
URINE-COLOR: YELLOW
UROBILINOGEN UR STRIP-ACNC: <2
UROBILINOGEN,SEMI-QN: 0.2 MG/DL (ref 0–1.9)
VIT B12 SERPL-MCNC: 731 PG/ML (ref 193–986)
VIT C UR-MCNC: NEGATIVE MG/DL
VLDLC SERPL CALC-MCNC: 29 MG/DL (ref 0–30)
WBC # BLD AUTO: 8.1 X10(3) UL (ref 4–11)

## 2022-07-07 PROCEDURE — 81003 URINALYSIS AUTO W/O SCOPE: CPT | Performed by: NURSE PRACTITIONER

## 2022-07-07 PROCEDURE — 3078F DIAST BP <80 MM HG: CPT | Performed by: NURSE PRACTITIONER

## 2022-07-07 PROCEDURE — 3075F SYST BP GE 130 - 139MM HG: CPT | Performed by: NURSE PRACTITIONER

## 2022-07-07 PROCEDURE — 3008F BODY MASS INDEX DOCD: CPT | Performed by: NURSE PRACTITIONER

## 2022-07-07 PROCEDURE — 99214 OFFICE O/P EST MOD 30 MIN: CPT | Performed by: NURSE PRACTITIONER

## 2022-07-07 PROCEDURE — 36415 COLL VENOUS BLD VENIPUNCTURE: CPT | Performed by: NURSE PRACTITIONER

## 2022-07-07 NOTE — PATIENT INSTRUCTIONS
ASSESSMENT/PLAN:   Bloating  (primary encounter diagnosis)  Increase fluids  Recommend increasing water, decreasing amount of milk and dairy daily  Fiber fruits:  Peaches, pears, nectarines, mangos, prunes  Fiber veges:  Broccoli, asparagus, cauliflower, cabbage  Dietary fiber is soriano to maintaining regular, soft stools and good bowel health. May try colace 1 tablet every other day with water daily. If no improvement in 2 weeks may increase to daily. Call if concerns or still with constipation issues    Fatigue, unspecified type  Check blood    Acquired hypothyroidism  Check blood    Essential hypertension, benign  Careful with diet and excercise at least 30 minutes 3-4 times a week. Check blood pressures at different times on different days. Can purchase own blood pressure monitor. If not, check at local pharmacy. Bake foods more and grill occasionally. Avoid fried foods. No salt. Use other seasonings.    CHeck blood    Dementia with behavioral disturbance, unspecified dementia type (hcc)  Follow up with neurology    Mixed hyperlipidemia  Check blood  Orders Placed This Encounter      CBC With Differential With Platelet      TSH W Reflex To Free T4      Vitamin B12      URINALYSIS, AUTO, W/O SCOPE      Urinalysis, Routine      Comp Metabolic Panel (14)      Urine Culture, Routine    Follow-up in 3 months or sooner if needed  Meds This Visit:  Requested Prescriptions      No prescriptions requested or ordered in this encounter       Imaging & Referrals:  None

## 2022-07-14 NOTE — PATIENT INSTRUCTIONS
ASSESSMENT/PLAN:   Dementia with behavioral disturbance, unspecified dementia type (hcc)  (primary encounter diagnosis) Stable. Mixed hyperlipidemia Stable. Acquired hypothyroidism Stable. Vaccine counseling Covid booster recc. Essential hypertension, benign Good control. Careful with diet and excercise at least 30 minutes 3-4 times a week. Check blood pressures at different times on different days. Can purchase own blood pressure monitor. If not, check at local pharmacy. Bake foods more and grill occasionally. Avoid fried foods. No salt. Use other seasonings. Cough. Try singuilar 10 mg at night as needed. Discussed with patient of side effects and use of these medications. No orders of the defined types were placed in this encounter.       Meds This Visit:  Requested Prescriptions      No prescriptions requested or ordered in this encounter       Imaging & Referrals:  None     RTC 6 months FU and medicare physical.

## 2022-07-17 DIAGNOSIS — G89.29 CHRONIC BILATERAL LOW BACK PAIN WITHOUT SCIATICA: ICD-10-CM

## 2022-07-17 DIAGNOSIS — M21.372 ACQUIRED LEFT FOOT DROP: ICD-10-CM

## 2022-07-17 DIAGNOSIS — M54.50 CHRONIC BILATERAL LOW BACK PAIN WITHOUT SCIATICA: ICD-10-CM

## 2022-07-19 RX ORDER — TRAMADOL HYDROCHLORIDE 50 MG/1
50 TABLET ORAL NIGHTLY PRN
Qty: 30 TABLET | Refills: 0 | Status: SHIPPED | OUTPATIENT
Start: 2022-07-19

## 2022-07-19 RX ORDER — MELOXICAM 7.5 MG/1
TABLET ORAL
Qty: 14 TABLET | Refills: 0 | OUTPATIENT
Start: 2022-07-19

## 2022-07-19 NOTE — TELEPHONE ENCOUNTER
Medication request: Meloxicam 7.5 mg oral tab. TAKE 1 TABLET BY MOUTH EVERY DAY FOR 5 DAYS THEN 1 TABLET DAILY AS NEEDED FOR PAIN. WITH FOOD: #14. No refills.          LOV: 5/31/2022  NOV:8/9/2022    [place]: 6/7/2022 #14

## 2022-07-28 DIAGNOSIS — F03.90 DEMENTIA (HCC): ICD-10-CM

## 2022-07-28 DIAGNOSIS — R41.3 MEMORY LOSS: ICD-10-CM

## 2022-07-28 DIAGNOSIS — E03.9 HYPOTHYROIDISM: ICD-10-CM

## 2022-07-28 RX ORDER — LOSARTAN POTASSIUM 25 MG/1
25 TABLET ORAL DAILY
Qty: 90 TABLET | Refills: 1 | Status: SHIPPED | OUTPATIENT
Start: 2022-07-28 | End: 2023-02-01

## 2022-07-28 RX ORDER — TAMSULOSIN HYDROCHLORIDE 0.4 MG/1
0.4 CAPSULE ORAL DAILY
Qty: 90 CAPSULE | Refills: 1 | Status: SHIPPED | OUTPATIENT
Start: 2022-07-28 | End: 2023-02-01

## 2022-08-08 ENCOUNTER — MED REC SCAN ONLY (OUTPATIENT)
Dept: PHYSICAL MEDICINE AND REHAB | Facility: CLINIC | Age: 87
End: 2022-08-08

## 2022-08-09 ENCOUNTER — OFFICE VISIT (OUTPATIENT)
Dept: PHYSICAL MEDICINE AND REHAB | Facility: CLINIC | Age: 87
End: 2022-08-09
Payer: COMMERCIAL

## 2022-08-09 VITALS
BODY MASS INDEX: 34.32 KG/M2 | OXYGEN SATURATION: 94 % | WEIGHT: 206 LBS | HEIGHT: 65 IN | HEART RATE: 73 BPM | DIASTOLIC BLOOD PRESSURE: 70 MMHG | SYSTOLIC BLOOD PRESSURE: 110 MMHG

## 2022-08-09 DIAGNOSIS — R26.89 FUNCTIONAL GAIT ABNORMALITY: Primary | ICD-10-CM

## 2022-08-09 DIAGNOSIS — G89.29 CHRONIC BILATERAL LOW BACK PAIN WITHOUT SCIATICA: ICD-10-CM

## 2022-08-09 DIAGNOSIS — M54.50 CHRONIC BILATERAL LOW BACK PAIN WITHOUT SCIATICA: ICD-10-CM

## 2022-08-09 PROCEDURE — 3074F SYST BP LT 130 MM HG: CPT | Performed by: PHYSICAL MEDICINE & REHABILITATION

## 2022-08-09 PROCEDURE — 99214 OFFICE O/P EST MOD 30 MIN: CPT | Performed by: PHYSICAL MEDICINE & REHABILITATION

## 2022-08-09 PROCEDURE — 3078F DIAST BP <80 MM HG: CPT | Performed by: PHYSICAL MEDICINE & REHABILITATION

## 2022-08-09 PROCEDURE — 3008F BODY MASS INDEX DOCD: CPT | Performed by: PHYSICAL MEDICINE & REHABILITATION

## 2022-08-09 RX ORDER — TRAMADOL HYDROCHLORIDE 50 MG/1
50 TABLET ORAL NIGHTLY PRN
Qty: 30 TABLET | Refills: 0 | Status: SHIPPED | OUTPATIENT
Start: 2022-08-18

## 2022-08-12 ENCOUNTER — OFFICE VISIT (OUTPATIENT)
Dept: DERMATOLOGY CLINIC | Facility: CLINIC | Age: 87
End: 2022-08-12
Payer: COMMERCIAL

## 2022-08-12 DIAGNOSIS — L82.1 SK (SEBORRHEIC KERATOSIS): ICD-10-CM

## 2022-08-12 DIAGNOSIS — D23.60 BENIGN NEOPLASM OF SKIN OF UPPER LIMB, INCLUDING SHOULDER, UNSPECIFIED LATERALITY: ICD-10-CM

## 2022-08-12 DIAGNOSIS — L81.4 LENTIGO: ICD-10-CM

## 2022-08-12 DIAGNOSIS — D23.70 BENIGN NEOPLASM OF SKIN OF LOWER LIMB, INCLUDING HIP, UNSPECIFIED LATERALITY: ICD-10-CM

## 2022-08-12 DIAGNOSIS — D23.9 BENIGN NEOPLASM OF SKIN, UNSPECIFIED LOCATION: Primary | ICD-10-CM

## 2022-08-12 DIAGNOSIS — D23.30 BENIGN NEOPLASM OF SKIN OF FACE: ICD-10-CM

## 2022-08-12 DIAGNOSIS — D22.9 MULTIPLE NEVI: ICD-10-CM

## 2022-08-12 DIAGNOSIS — D23.4 BENIGN NEOPLASM OF SCALP AND SKIN OF NECK: ICD-10-CM

## 2022-08-12 DIAGNOSIS — D23.5 BENIGN NEOPLASM OF SKIN OF TRUNK, EXCEPT SCROTUM: ICD-10-CM

## 2022-08-12 DIAGNOSIS — L82.1 VERRUCOUS KERATOSIS: ICD-10-CM

## 2022-08-22 ENCOUNTER — TELEPHONE (OUTPATIENT)
Dept: PHYSICAL MEDICINE AND REHAB | Facility: CLINIC | Age: 87
End: 2022-08-22

## 2022-08-22 NOTE — TELEPHONE ENCOUNTER
Patients daughter is calling in stating insurance is requesting prior authorization is needed for physical therapy.

## 2022-08-22 NOTE — TELEPHONE ENCOUNTER
Physical therapist request authorization for PT from insurance. LOV for PT 07/06/22 with therapist Az Coronado, message routed for review.

## 2022-08-26 DIAGNOSIS — F03.90 DEMENTIA (HCC): ICD-10-CM

## 2022-08-26 DIAGNOSIS — E03.9 HYPOTHYROIDISM: ICD-10-CM

## 2022-08-26 DIAGNOSIS — R41.3 MEMORY LOSS: ICD-10-CM

## 2022-08-26 RX ORDER — AMLODIPINE BESYLATE 5 MG/1
TABLET ORAL
Qty: 90 TABLET | Refills: 1 | OUTPATIENT
Start: 2022-08-26

## 2022-09-15 DIAGNOSIS — G89.29 CHRONIC BILATERAL LOW BACK PAIN WITHOUT SCIATICA: ICD-10-CM

## 2022-09-15 DIAGNOSIS — M54.50 CHRONIC BILATERAL LOW BACK PAIN WITHOUT SCIATICA: ICD-10-CM

## 2022-09-15 RX ORDER — TRAMADOL HYDROCHLORIDE 50 MG/1
50 TABLET ORAL NIGHTLY PRN
Qty: 30 TABLET | Refills: 0 | Status: SHIPPED | OUTPATIENT
Start: 2022-09-17 | End: 2022-10-19

## 2022-09-15 NOTE — TELEPHONE ENCOUNTER
Refill Request    Medication request: traMADol 50 MG Oral Tab    LOV:8/9/2022 Yoshi Husbands, DO   Due back to clinic per last office note:  PRN -> MRI worsening symptoms. NOV: 10/11/2022 Yoshi Husbands, DO      [place]: 8/18/22 #30    Urine drug screen (if applicable): None  Pain contract: None    LOV plan (if weaning or changing medications): Tramadol post-dated for later this month.

## 2022-09-19 ENCOUNTER — TELEPHONE (OUTPATIENT)
Dept: PHYSICAL MEDICINE AND REHAB | Facility: CLINIC | Age: 87
End: 2022-09-19

## 2022-09-19 DIAGNOSIS — F03.91 DEMENTIA WITH BEHAVIORAL DISTURBANCE, UNSPECIFIED DEMENTIA TYPE: ICD-10-CM

## 2022-09-19 DIAGNOSIS — M21.372 ACQUIRED LEFT FOOT DROP: ICD-10-CM

## 2022-09-19 DIAGNOSIS — R26.89 FUNCTIONAL GAIT ABNORMALITY: ICD-10-CM

## 2022-09-19 DIAGNOSIS — M54.50 CHRONIC BILATERAL LOW BACK PAIN WITHOUT SCIATICA: Primary | ICD-10-CM

## 2022-09-19 DIAGNOSIS — G89.29 CHRONIC BILATERAL LOW BACK PAIN WITHOUT SCIATICA: Primary | ICD-10-CM

## 2022-09-19 NOTE — TELEPHONE ENCOUNTER
Last Physical Therapy order was placed 2022 for Physical Therapy which is now considered  as the orders are only valid for 30 days   fyi-the Physical Therapy facility will obtain authorization    Patients daughter Keith Torrez says patient needs a new PT order. Keith Torrez says patients balance is worsening due to lbp and did not start the Physical Therapy in 2022 when order was placed.

## 2022-09-21 ENCOUNTER — ORDER TRANSCRIPTION (OUTPATIENT)
Dept: PHYSICAL THERAPY | Facility: HOSPITAL | Age: 87
End: 2022-09-21

## 2022-09-21 DIAGNOSIS — M54.50 LUMBAGO: Primary | ICD-10-CM

## 2022-09-28 ENCOUNTER — TELEPHONE (OUTPATIENT)
Dept: PHYSICAL THERAPY | Facility: HOSPITAL | Age: 87
End: 2022-09-28

## 2022-09-29 ENCOUNTER — OFFICE VISIT (OUTPATIENT)
Dept: PHYSICAL THERAPY | Age: 87
End: 2022-09-29
Attending: PHYSICAL MEDICINE & REHABILITATION

## 2022-09-29 PROCEDURE — 97162 PT EVAL MOD COMPLEX 30 MIN: CPT

## 2022-09-29 PROCEDURE — 97110 THERAPEUTIC EXERCISES: CPT

## 2022-10-03 ENCOUNTER — OFFICE VISIT (OUTPATIENT)
Dept: PHYSICAL THERAPY | Age: 87
End: 2022-10-03
Attending: PHYSICAL MEDICINE & REHABILITATION
Payer: MEDICARE

## 2022-10-03 PROCEDURE — 97110 THERAPEUTIC EXERCISES: CPT

## 2022-10-08 RX ORDER — MONTELUKAST SODIUM 10 MG/1
10 TABLET ORAL NIGHTLY
Qty: 90 TABLET | Refills: 1 | Status: SHIPPED | OUTPATIENT
Start: 2022-10-08 | End: 2023-04-08

## 2022-10-08 NOTE — TELEPHONE ENCOUNTER
Refill passed per [place] [employer] [facility] protocol.   Requested Prescriptions   Pending Prescriptions Disp Refills    MONTELUKAST 10 MG Oral Tab [Pharmacy Med Name: MONTELUKAST SOD 10 MG TABLET] 90 tablet 1     Sig: TAKE 1 TABLET BY MOUTH EVERY DAY AT NIGHT        Asthma & COPD Medication Protocol Passed - 10/8/2022  8:42 AM        Passed - In person appointment or virtual visit in the past 6 mos or appointment in next 3 mos       Recent Outpatient Visits              5 days ago     [address], Melia Razo    Office Visit    1 week ago     [address] Argelia Hyatt [place]    Office Visit    1 month ago Benign neoplasm of skin, unspecified location    [facility] Mary Jones [facility]    Office Visit    2 months ago Functional gait abnormality    [facility]    Office Visit    2 months ago Dementia with behavioral disturbance, unspecified dementia type [facility])    [facility], [facility], [address] [facility], Everardo Avery MD    Office Visit     Future Appointments         Provider Department Appt Notes    In 1 week Argelia Hyatt,  [address] [facility] MCR  $40 c/p, no limit, no auth    In 1 week [facility] dEmar, [address] [facility] MCR  $40 c/p, no limit, no auth    In 2 weeks [facility] Edmar, [address] [facility] MCR  $40 c/p, no limit, no auth    In 3 weeks [facility], [address] [facility] MCR  $40 c/p, no limit, no auth    In 3 weeks [facility] [place], [address] [facility] MCR  $40 c/p, no limit, no auth    In 1 month [facility], [address] [facility] MCR  $40 c/p, no limit, no Magdalen Pucker In 1 month [place], [place] MCR  $40 c/p, no limit, no auth    In 1 month Arlyss Bullion, [place] MCR  $40 c/p, no limit, no auth                   Future Appointments         Provider Department Appt Notes    In 1 week Arlyss Bullion, PT [place] MCR  $40 c/p, no limit, no auth    In 1 week Arlyss Bullion, [place] MCR  $40 c/p, no limit, no auth    In 2 weeks Arlyss Bullion, [place] MCR  $40 c/p, no limit, no auth    In 3 weeks Arlyss Bullion, [place] MCR  $40 c/p, no limit, no auth    In 3 weeks Arlyss Bullion, [place] MCR  $40 c/p, no limit, no auth    In 1 month Arlyss Bullion, [place] MCR  $40 c/p, no limit, no auth    In 1 month Arlyss Bullion, [place] MCR  $40 c/p, no limit, no auth    In 1 month Arlyss Bullion, [place] MCR  $40 c/p, no limit, no [place]          Recent Outpatient Visits              5 days ago     [address] Arlyss Bullion, [place]    Office Visit    1 week ago     [address] Arlyss Bull[place], [place]    Office Visit    1 month ago Benign neoplasm of skin, unspecified location    [facility] [place] [facility] Giselle Vines MD    Office Visit    2 months ago Functional gait abnormality    [facility] [place]    Office Visit    2 months ago Dementia with behavioral disturbance, unspecified dementia type [facility])    [facility], [facility], [address], Lazara Lundberg MD    Office Visit

## 2022-10-15 DIAGNOSIS — F03.90 DEMENTIA (HCC): ICD-10-CM

## 2022-10-15 DIAGNOSIS — E03.9 HYPOTHYROIDISM: ICD-10-CM

## 2022-10-15 DIAGNOSIS — R41.3 MEMORY LOSS: ICD-10-CM

## 2022-10-16 DIAGNOSIS — G89.29 CHRONIC BILATERAL LOW BACK PAIN WITHOUT SCIATICA: ICD-10-CM

## 2022-10-16 DIAGNOSIS — M54.50 CHRONIC BILATERAL LOW BACK PAIN WITHOUT SCIATICA: ICD-10-CM

## 2022-10-16 RX ORDER — FINASTERIDE 5 MG/1
5 TABLET, FILM COATED ORAL DAILY
Qty: 90 TABLET | Refills: 1 | Status: SHIPPED | OUTPATIENT
Start: 2022-10-16 | End: 2023-04-13

## 2022-10-16 NOTE — TELEPHONE ENCOUNTER
Refill passed per [facility] protocol. Requested Prescriptions   Pending Prescriptions Disp Refills    FINASTERIDE 5 MG Oral Tab [Pharmacy Med Name: FINASTERIDE 5 MG TABLET] 90 tablet 1     Sig: Take 1 tablet (5 mg total) by mouth daily.         Genitourinary Medications Passed - 10/15/2022 12:04 AM        Passed - Patient does not have pulmonary hypertension on problem list        Passed - In person appointment or virtual visit in the past 12 mos or appointment in next 3 mos       Recent Outpatient Visits              1 week ago     [address] [place]    Office Visit    2 weeks ago     [address] [place], [place]    Office Visit    2 months ago Benign neoplasm of skin, unspecified location    [facility] [place] [facility] Vilma Chun MD    Office Visit    2 months ago Functional gait abnormality    [facility] Matilda García DO    Office Visit    3 months ago Dementia with behavioral disturbance, unspecified dementia type [facility])    [facility], [address] [facility],[address], [facility]    Office Visit     Future Appointments         Provider Department Appt Notes    Tomorrow [facility], [address] [facility] MCR  $40 c/p, no limit, no auth    In 5 days [facility], [address] [facility] MCR  $40 c/p, no limit, no auth    In 1 week [facility], [address] [facility] MCR  $40 c/p, no limit, no auth    In 2 weeks [facility], [address] [facility] MCR  $40 c/p, no limit, no auth    In 2 weeks [facility], [address] [facility] MCR  $40 c/p, no limit, no auth    In 3 weeks [facility] [address] MCR  $40 c/p, no limit, no auth    In 3 weeks Mj Comber, [facility] MCR  $40 c/p, no limit, no auth    In 1 month Mj Comber, [facility] MCR  $40 c/p, no limit, no Jefm Brothers                     Recent Outpatient Visits              1 week ago     [address] Mj [place], [place]    Office Visit    2 weeks ago     [address] Mj [place], [place]    Office Visit    2 months ago Benign neoplasm of skin, unspecified location    [facility] Frances Izquierdo MD    Office Visit    2 months ago Functional gait abnormality    [address] [facility] Yoshi Husbands, DO    Office Visit    3 months ago Dementia with behavioral disturbance, unspecified dementia type [facility])    [facility], [address] [place] [address],[address], Marci Avery MD    Office Visit            Future Appointments         Provider Department Appt Notes    Tomorrow Mj Comber, [facility] MCR  $40 c/p, no limit, no auth    In 5 days Mj Comber, [facility] MCR  $40 c/p, no limit, no auth    In 1 week Mj Comber, [facility] MCR  $40 c/p, no limit, no auth    In 2 weeks Mj Comber, [facility] MCR  $40 c/p, no limit, no auth    In 2 weeks Mj Comber, [facility] MCR  $40 c/p, no limit, no auth    In 3 weeks Mj Comber, [facility] MCR  $40 c/p, no limit, no auth    In 3 weeks Mj Comber, [facility] MCR  $40 c/p, no limit, no auth    In 1 month Mahesh Lujan, [facility] MCR  $40 c/p, no limit, no Yisel Shook

## 2022-10-17 ENCOUNTER — OFFICE VISIT (OUTPATIENT)
Dept: PHYSICAL THERAPY | Age: 87
End: 2022-10-17
Attending: PHYSICAL MEDICINE & REHABILITATION
Payer: MEDICARE

## 2022-10-17 PROCEDURE — 97110 THERAPEUTIC EXERCISES: CPT

## 2022-10-18 DIAGNOSIS — M54.50 CHRONIC BILATERAL LOW BACK PAIN WITHOUT SCIATICA: ICD-10-CM

## 2022-10-18 DIAGNOSIS — G89.29 CHRONIC BILATERAL LOW BACK PAIN WITHOUT SCIATICA: ICD-10-CM

## 2022-10-18 RX ORDER — TRAMADOL HYDROCHLORIDE 50 MG/1
50 TABLET ORAL NIGHTLY PRN
Qty: 30 TABLET | Refills: 0 | Status: CANCELLED | OUTPATIENT
Start: 2022-10-18

## 2022-10-19 RX ORDER — TRAMADOL HYDROCHLORIDE 50 MG/1
TABLET ORAL
Qty: 30 TABLET | Refills: 0 | Status: SHIPPED | OUTPATIENT
Start: 2022-10-19

## 2022-10-21 ENCOUNTER — OFFICE VISIT (OUTPATIENT)
Dept: PHYSICAL THERAPY | Age: 87
End: 2022-10-21
Attending: PHYSICAL MEDICINE & REHABILITATION
Payer: MEDICARE

## 2022-10-21 PROCEDURE — 97110 THERAPEUTIC EXERCISES: CPT

## 2022-10-21 NOTE — TELEPHONE ENCOUNTER
Faxed letter to [phone] at number below. Swati Rabago MD  10/20/22 [id number]

## 2022-10-25 ENCOUNTER — APPOINTMENT (OUTPATIENT)
Dept: PHYSICAL THERAPY | Age: 87
End: 2022-10-25
Attending: PHYSICAL MEDICINE & REHABILITATION
Payer: MEDICARE

## 2022-10-28 ENCOUNTER — OFFICE VISIT (OUTPATIENT)
Dept: PHYSICAL THERAPY | Age: 87
End: 2022-10-28
Attending: PHYSICAL MEDICINE & REHABILITATION
Payer: MEDICARE

## 2022-10-28 PROCEDURE — 97110 THERAPEUTIC EXERCISES: CPT

## 2022-11-02 ENCOUNTER — OFFICE VISIT (OUTPATIENT)
Dept: PHYSICAL THERAPY | Age: 87
End: 2022-11-02
Attending: PHYSICAL MEDICINE & REHABILITATION
Payer: MEDICARE

## 2022-11-02 PROCEDURE — 97110 THERAPEUTIC EXERCISES: CPT

## 2022-11-04 ENCOUNTER — OFFICE VISIT (OUTPATIENT)
Dept: PHYSICAL THERAPY | Age: 87
End: 2022-11-04
Attending: PHYSICAL MEDICINE & REHABILITATION
Payer: MEDICARE

## 2022-11-04 PROCEDURE — 97110 THERAPEUTIC EXERCISES: CPT

## 2022-11-07 ENCOUNTER — APPOINTMENT (OUTPATIENT)
Dept: PHYSICAL THERAPY | Age: 87
End: 2022-11-07
Attending: PHYSICAL MEDICINE & REHABILITATION
Payer: MEDICARE

## 2022-11-10 ENCOUNTER — OFFICE VISIT (OUTPATIENT)
Dept: PHYSICAL THERAPY | Age: 87
End: 2022-11-10
Attending: PHYSICAL MEDICINE & REHABILITATION
Payer: MEDICARE

## 2022-11-10 PROCEDURE — 97110 THERAPEUTIC EXERCISES: CPT

## 2022-11-14 DIAGNOSIS — E03.9 HYPOTHYROIDISM: ICD-10-CM

## 2022-11-14 DIAGNOSIS — R41.3 MEMORY LOSS: ICD-10-CM

## 2022-11-14 DIAGNOSIS — F03.90 DEMENTIA (HCC): ICD-10-CM

## 2022-11-15 RX ORDER — OMEPRAZOLE 20 MG/1
20 CAPSULE, DELAYED RELEASE ORAL DAILY
Qty: 90 CAPSULE | Refills: 1 | Status: SHIPPED | OUTPATIENT
Start: 2022-11-15

## 2022-11-15 NOTE — TELEPHONE ENCOUNTER
Refill passed per [employer] protocol.     Requested Prescriptions   Pending Prescriptions Disp Refills    OMEPRAZOLE 20 MG Oral Capsule Delayed Release [Pharmacy Med Name: OMEPRAZOLE DR 20 MG CAPSULE] 90 capsule 1     Sig: TAKE 1 CAPSULE BY MOUTH EVERY DAY       Gastrointestional Medication Protocol Passed - 11/14/2022 12:04 AM        Passed - In person appointment or virtual visit in the past 12 mos or appointment in next 3 mos     Recent Outpatient Visits              5 days ago     [address], [place]    Office Visit    1 week ago     [address], [place]    Office Visit    1 week ago     [address] [place], [place]    Office Visit    2 weeks ago     [address] [place], [place]    Office Visit    3 weeks ago     [address] [place], [place]    Office Visit          Future Appointments         Provider Department Appt Notes    In 3 days Mj Pillai, [address]     In 1 week Yoshi Husbands, [address] [facility] [place]-Physiatry back pain    In 2 weeks Mj Pillai, PT [address] MCR  $40 c/p, no limit, no auth    In 4 months Johnny Olea MD [employer] [employer], [address] [facility] [address] [address] [facility] [address] follow up on arthur                  Recent Outpatient Visits              5 days ago     [address], [place]    Office Visit    1 week ago     [address], [place]    Office Visit    1 week ago     [address], [place]    Office Visit    2 weeks ago     [facility] [address] Melia Bishop    Office Visit    3 weeks ago     [address] David Hoover [place]    Office Visit          Future Appointments         Provider Department Appt Notes    In 3 days David Hoover, [address]     In 1 week Eleonora Barnes, [address] [facility], [place]-Physiatry back pain    In 2 weeks David Hoover, [address] MCR  $40 c/p, no limit, no auth    In 4 months Verito Dow MD [facility], [facility], [address] [facility] follow up on arthur

## 2022-11-16 DIAGNOSIS — M54.50 CHRONIC BILATERAL LOW BACK PAIN WITHOUT SCIATICA: ICD-10-CM

## 2022-11-16 DIAGNOSIS — G89.29 CHRONIC BILATERAL LOW BACK PAIN WITHOUT SCIATICA: ICD-10-CM

## 2022-11-16 RX ORDER — TRAMADOL HYDROCHLORIDE 50 MG/1
50 TABLET ORAL NIGHTLY PRN
Qty: 30 TABLET | Refills: 0 | Status: CANCELLED | OUTPATIENT
Start: 2022-11-16

## 2022-11-17 ENCOUNTER — APPOINTMENT (OUTPATIENT)
Dept: PHYSICAL THERAPY | Age: 87
End: 2022-11-17
Attending: PHYSICAL MEDICINE & REHABILITATION
Payer: MEDICARE

## 2022-11-18 ENCOUNTER — OFFICE VISIT (OUTPATIENT)
Dept: PHYSICAL THERAPY | Age: 87
End: 2022-11-18
Attending: PHYSICAL MEDICINE & REHABILITATION
Payer: MEDICARE

## 2022-11-18 PROCEDURE — 97110 THERAPEUTIC EXERCISES: CPT

## 2022-11-18 RX ORDER — TRAMADOL HYDROCHLORIDE 50 MG/1
TABLET ORAL
Qty: 30 TABLET | Refills: 0 | Status: SHIPPED | OUTPATIENT
Start: 2022-11-18

## 2022-11-18 NOTE — TELEPHONE ENCOUNTER
Refill Request    Medication request: TRAMADOL 50 MG Oral Tab. TAKE 1 TABLET BY MOUTH NIGHTLY AS NEEDED FOR PAIN.    Juliana Florentino DO   Due back to clinic per last office note: Per Dr. Gardenia Goncalves: Jarrell Copeland in about 2 months (around 10/9/2022). \"  NOV: 11/22/2022 Travis Tian DO      [place]: 10/19/2022 #30    Urine drug screen (if applicable): NONE  Pain contract: NONE    LOV plan (if weaning or changing medications): Per Dr. Gardenia Goncalves: Varsha Adams post-dated for later this month. \"

## 2022-11-18 NOTE — TELEPHONE ENCOUNTER
See other refill TE 11/16/2022. Duplicate request removed. Sent [employer] message to update patient.

## 2022-11-22 ENCOUNTER — OFFICE VISIT (OUTPATIENT)
Dept: PHYSICAL MEDICINE AND REHAB | Facility: CLINIC | Age: 87
End: 2022-11-22
Payer: COMMERCIAL

## 2022-11-22 VITALS
HEIGHT: 65 IN | HEART RATE: 72 BPM | OXYGEN SATURATION: 95 % | DIASTOLIC BLOOD PRESSURE: 72 MMHG | BODY MASS INDEX: 34.32 KG/M2 | WEIGHT: 206 LBS | SYSTOLIC BLOOD PRESSURE: 122 MMHG

## 2022-11-22 DIAGNOSIS — R26.89 FUNCTIONAL GAIT ABNORMALITY: ICD-10-CM

## 2022-11-22 DIAGNOSIS — M54.50 CHRONIC BILATERAL LOW BACK PAIN WITHOUT SCIATICA: Primary | ICD-10-CM

## 2022-11-22 DIAGNOSIS — G89.29 CHRONIC BILATERAL LOW BACK PAIN WITHOUT SCIATICA: Primary | ICD-10-CM

## 2022-11-22 PROCEDURE — 3008F BODY MASS INDEX DOCD: CPT | Performed by: PHYSICAL MEDICINE & REHABILITATION

## 2022-11-22 PROCEDURE — 3078F DIAST BP <80 MM HG: CPT | Performed by: PHYSICAL MEDICINE & REHABILITATION

## 2022-11-22 PROCEDURE — 3074F SYST BP LT 130 MM HG: CPT | Performed by: PHYSICAL MEDICINE & REHABILITATION

## 2022-11-22 PROCEDURE — 99213 OFFICE O/P EST LOW 20 MIN: CPT | Performed by: PHYSICAL MEDICINE & REHABILITATION

## 2022-11-22 NOTE — PATIENT INSTRUCTIONS
Continue the tramadol at night time. If you are able to walk with your caregiver that will be good going forwards. If the weather does not permit or if you are afraid of falling in bad weather I would recommend using a recumbent or stationary bike to maintain the strength in your legs until a time when you walk more regularly.

## 2022-12-05 ENCOUNTER — TELEPHONE (OUTPATIENT)
Dept: PHYSICAL THERAPY | Facility: HOSPITAL | Age: 87
End: 2022-12-05

## 2022-12-05 ENCOUNTER — APPOINTMENT (OUTPATIENT)
Dept: PHYSICAL THERAPY | Age: 87
End: 2022-12-05
Attending: PHYSICAL MEDICINE & REHABILITATION

## 2022-12-17 DIAGNOSIS — G89.29 CHRONIC BILATERAL LOW BACK PAIN WITHOUT SCIATICA: ICD-10-CM

## 2022-12-17 DIAGNOSIS — M54.50 CHRONIC BILATERAL LOW BACK PAIN WITHOUT SCIATICA: ICD-10-CM

## 2022-12-19 RX ORDER — TRAMADOL HYDROCHLORIDE 50 MG/1
TABLET ORAL
Qty: 30 TABLET | Refills: 0 | OUTPATIENT
Start: 2022-12-19

## 2022-12-19 NOTE — TELEPHONE ENCOUNTER
Duplicate request.   See refill request 12/16/2022 for details - approved by Dr. Ruth Kehr on 12/19/2022. Updated patient via [address]

## 2022-12-20 ENCOUNTER — OFFICE VISIT (OUTPATIENT)
Dept: PHYSICAL THERAPY | Age: 87
End: 2022-12-20
Attending: PHYSICAL MEDICINE & REHABILITATION
Payer: MEDICARE

## 2022-12-20 PROCEDURE — 97110 THERAPEUTIC EXERCISES: CPT

## 2023-01-16 DIAGNOSIS — M54.50 CHRONIC BILATERAL LOW BACK PAIN WITHOUT SCIATICA: ICD-10-CM

## 2023-01-16 DIAGNOSIS — G89.29 CHRONIC BILATERAL LOW BACK PAIN WITHOUT SCIATICA: ICD-10-CM

## 2023-01-16 NOTE — TELEPHONE ENCOUNTER
Medication request: Tramadol 50 mg oral tab. Take 1 tablet by mouth nightly as needed for pain. #30. No refills. LOV: 11/22/2022  NOV: None. Return in about 6 months (around 5/22/2023).       [place]: 12/19/2022 #30

## 2023-01-17 RX ORDER — TRAMADOL HYDROCHLORIDE 50 MG/1
TABLET ORAL
Qty: 30 TABLET | Refills: 0 | Status: SHIPPED | OUTPATIENT
Start: 2023-01-17

## 2023-02-27 ENCOUNTER — APPOINTMENT (OUTPATIENT)
Dept: ULTRASOUND IMAGING | Facility: HOSPITAL | Age: 88
End: 2023-02-27
Attending: EMERGENCY MEDICINE
Payer: MEDICARE

## 2023-02-27 ENCOUNTER — HOSPITAL ENCOUNTER (EMERGENCY)
Facility: HOSPITAL | Age: 88
Discharge: HOME | End: 2023-02-27
Attending: EMERGENCY MEDICINE
Payer: MEDICARE

## 2023-02-27 ENCOUNTER — NURSE TRIAGE (OUTPATIENT)
Dept: INTERNAL MEDICINE CLINIC | Facility: CLINIC | Age: 88
End: 2023-02-27

## 2023-02-27 VITALS
BODY MASS INDEX: 33.32 KG/M2 | WEIGHT: 200 LBS | HEART RATE: 72 BPM | RESPIRATION RATE: 16 BRPM | OXYGEN SATURATION: 98 % | SYSTOLIC BLOOD PRESSURE: 154 MMHG | DIASTOLIC BLOOD PRESSURE: 78 MMHG | TEMPERATURE: 98 F | HEIGHT: 65 IN

## 2023-02-27 DIAGNOSIS — M79.604 PAIN OF RIGHT LOWER EXTREMITY: Primary | ICD-10-CM

## 2023-02-27 LAB
ANION GAP SERPL CALC-SCNC: 4 MMOL/L (ref 0–18)
BASOPHILS # BLD AUTO: 0.03 X10(3) UL (ref 0–0.2)
BASOPHILS NFR BLD AUTO: 0.4 %
BUN BLD-MCNC: 21 MG/DL (ref 7–18)
BUN/CREAT SERPL: 17.8 (ref 10–20)
CALCIUM BLD-MCNC: 9.1 MG/DL (ref 8.5–10.1)
CHLORIDE SERPL-SCNC: 106 MMOL/L (ref 98–112)
CO2 SERPL-SCNC: 29 MMOL/L (ref 21–32)
CREAT BLD-MCNC: 1.18 MG/DL
DEPRECATED RDW RBC AUTO: 42.9 FL (ref 35.1–46.3)
EOSINOPHIL # BLD AUTO: 0.36 X10(3) UL (ref 0–0.7)
EOSINOPHIL NFR BLD AUTO: 4.3 %
ERYTHROCYTE [DISTWIDTH] IN BLOOD BY AUTOMATED COUNT: 13 % (ref 11–15)
GFR SERPLBLD BASED ON 1.73 SQ M-ARVRAT: 58 ML/MIN/1.73M2 (ref 60–?)
GLUCOSE BLD-MCNC: 101 MG/DL (ref 70–99)
HCT VFR BLD AUTO: 46 %
HGB BLD-MCNC: 14.9 G/DL
IMM GRANULOCYTES # BLD AUTO: 0.03 X10(3) UL (ref 0–1)
IMM GRANULOCYTES NFR BLD: 0.4 %
LYMPHOCYTES # BLD AUTO: 2.22 X10(3) UL (ref 1–4)
LYMPHOCYTES NFR BLD AUTO: 26.4 %
MCH RBC QN AUTO: 29.4 PG (ref 26–34)
MCHC RBC AUTO-ENTMCNC: 32.4 G/DL (ref 31–37)
MCV RBC AUTO: 90.7 FL
MONOCYTES # BLD AUTO: 1.04 X10(3) UL (ref 0.1–1)
MONOCYTES NFR BLD AUTO: 12.4 %
NEUTROPHILS # BLD AUTO: 4.74 X10 (3) UL (ref 1.5–7.7)
NEUTROPHILS # BLD AUTO: 4.74 X10(3) UL (ref 1.5–7.7)
NEUTROPHILS NFR BLD AUTO: 56.1 %
OSMOLALITY SERPL CALC.SUM OF ELEC: 291 MOSM/KG (ref 275–295)
PLATELET # BLD AUTO: 164 10(3)UL (ref 150–450)
POTASSIUM SERPL-SCNC: 4.6 MMOL/L (ref 3.5–5.1)
RBC # BLD AUTO: 5.07 X10(6)UL
SODIUM SERPL-SCNC: 139 MMOL/L (ref 136–145)
WBC # BLD AUTO: 8.4 X10(3) UL (ref 4–11)

## 2023-02-27 PROCEDURE — 80048 BASIC METABOLIC PNL TOTAL CA: CPT | Performed by: EMERGENCY MEDICINE

## 2023-02-27 PROCEDURE — 99285 EMERGENCY DEPT VISIT HI MDM: CPT

## 2023-02-27 PROCEDURE — 93971 EXTREMITY STUDY: CPT | Performed by: EMERGENCY MEDICINE

## 2023-02-27 PROCEDURE — 85025 COMPLETE CBC W/AUTO DIFF WBC: CPT | Performed by: EMERGENCY MEDICINE

## 2023-02-27 PROCEDURE — 36415 COLL VENOUS BLD VENIPUNCTURE: CPT

## 2023-02-27 PROCEDURE — 99284 EMERGENCY DEPT VISIT MOD MDM: CPT

## 2023-02-27 NOTE — ED INITIAL ASSESSMENT (HPI)
Worsening right calf pain radiating up to right thigh x 1 week. Per family leg is not more red or swollen than normal.   Hx of back surgery and IVC filter  Sent by Dr. Shahid Chaudhary with concern for blood clot.

## 2023-02-27 NOTE — TELEPHONE ENCOUNTER
Language line assistance. Left message to call back.   Future Appointments   Date Time Provider Kateryna Andino   3/1/2023 10:30 AM Eitan Rondon DO PM&R [facility]   3/2/2023 11:30 AM MAKENZIE Liu [id number] [place] [address]   4/6/2023  2:30 PM Jaguar Rowan MD [id number] [id number]

## 2023-03-01 ENCOUNTER — OFFICE VISIT (OUTPATIENT)
Dept: PHYSICAL MEDICINE AND REHAB | Facility: CLINIC | Age: 88
End: 2023-03-01
Payer: COMMERCIAL

## 2023-03-01 ENCOUNTER — HOSPITAL ENCOUNTER (OUTPATIENT)
Dept: GENERAL RADIOLOGY | Facility: HOSPITAL | Age: 88
Discharge: HOME | End: 2023-03-01
Attending: PHYSICAL MEDICINE & REHABILITATION
Payer: MEDICARE

## 2023-03-01 VITALS — OXYGEN SATURATION: 94 % | WEIGHT: 200 LBS | HEART RATE: 78 BPM | BODY MASS INDEX: 33.32 KG/M2 | HEIGHT: 65 IN

## 2023-03-01 DIAGNOSIS — M54.16 RIGHT LUMBAR RADICULITIS: ICD-10-CM
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
## 2023-03-01 DIAGNOSIS — M79.604 RIGHT LEG PAIN: Primary | ICD-10-CM

## 2023-03-01 DIAGNOSIS — M79.604 RIGHT LEG PAIN: ICD-10-CM

## 2023-03-01 PROCEDURE — 73590 X-RAY EXAM OF LOWER LEG: CPT | Performed by: PHYSICAL MEDICINE & REHABILITATION

## 2023-03-01 PROCEDURE — 3008F BODY MASS INDEX DOCD: CPT | Performed by: PHYSICAL MEDICINE & REHABILITATION

## 2023-03-01 PROCEDURE — 1126F AMNT PAIN NOTED NONE PRSNT: CPT | Performed by: PHYSICAL MEDICINE & REHABILITATION

## 2023-03-01 PROCEDURE — 99213 OFFICE O/P EST LOW 20 MIN: CPT | Performed by: PHYSICAL MEDICINE & REHABILITATION

## 2023-03-01 RX ORDER — METHYLPREDNISOLONE 4 MG/1
TABLET ORAL
Qty: 1 EACH | Refills: 0 | Status: SHIPPED | OUTPATIENT
Start: 2023-03-01

## 2023-03-06 ENCOUNTER — OFFICE VISIT (OUTPATIENT)
Dept: INTERNAL MEDICINE CLINIC | Facility: CLINIC | Age: 88
End: 2023-03-06

## 2023-03-06 VITALS
WEIGHT: 217.81 LBS | BODY MASS INDEX: 36.29 KG/M2 | SYSTOLIC BLOOD PRESSURE: 132 MMHG | HEART RATE: 78 BPM | DIASTOLIC BLOOD PRESSURE: 64 MMHG | OXYGEN SATURATION: 99 % | HEIGHT: 65 IN

## 2023-03-06 DIAGNOSIS — M79.604 PAIN OF RIGHT LOWER EXTREMITY: ICD-10-CM

## 2023-03-06 DIAGNOSIS — R26.89 BALANCE PROBLEM: ICD-10-CM

## 2023-03-06 DIAGNOSIS — M54.16 RIGHT LUMBAR RADICULITIS: ICD-10-CM

## 2023-03-06 DIAGNOSIS — F03.918 DEMENTIA WITH OTHER BEHAVIORAL DISTURBANCE, UNSPECIFIED DEMENTIA SEVERITY, UNSPECIFIED DEMENTIA TYPE (HCC): Primary | ICD-10-CM

## 2023-03-06 PROCEDURE — 3075F SYST BP GE 130 - 139MM HG: CPT | Performed by: NURSE PRACTITIONER

## 2023-03-06 PROCEDURE — 3008F BODY MASS INDEX DOCD: CPT | Performed by: NURSE PRACTITIONER

## 2023-03-06 PROCEDURE — 3078F DIAST BP <80 MM HG: CPT | Performed by: NURSE PRACTITIONER

## 2023-03-06 PROCEDURE — 99214 OFFICE O/P EST MOD 30 MIN: CPT | Performed by: NURSE PRACTITIONER

## 2023-03-06 NOTE — PATIENT INSTRUCTIONS
ASSESSMENT/PLAN:   Dementia with other behavioral disturbance, unspecified dementia severity, unspecified dementia type (hcc)  (primary encounter diagnosis)  Follow-up with neurology-call to schedule    Balance problem  Start physical therapy referral entered call to schedule    Right lumbar radiculitis  Continue with Medrol Dosepak with food until finished    Pain of right lower extremity  Continue with Medrol Dosepak  Start physical therapy  Follow-up with physiatrist    Weight gain  Encourage walking daily starting with 10 minutes daily. Eat smaller healthy meals throughout the day    No orders of the defined types were placed in this encounter. Follow-up as scheduled for physical or sooner if needed.   Meds This Visit:  Requested Prescriptions      No prescriptions requested or ordered in this encounter       Imaging & Referrals:  PHYSICAL THERAPY - INTERNAL

## 2023-03-16 DIAGNOSIS — M54.50 CHRONIC BILATERAL LOW BACK PAIN WITHOUT SCIATICA: ICD-10-CM

## 2023-03-16 DIAGNOSIS — G89.29 CHRONIC BILATERAL LOW BACK PAIN WITHOUT SCIATICA: ICD-10-CM

## 2023-03-16 RX ORDER — TRAMADOL HYDROCHLORIDE 50 MG/1
TABLET ORAL
Qty: 30 TABLET | Refills: 0 | Status: SHIPPED | OUTPATIENT
Start: 2023-03-16

## 2023-03-16 NOTE — TELEPHONE ENCOUNTER
Refill Request    Medication request: traMADol 50 MG Oral Tab  Take 1 tablet (50 mg total) by mouth nightly as needed for Pain. NQY:6/7/9894 Marimar Alejandro DO   Due back to clinic per last office note:  No mention. NOV: 4/18/2023 Marimar Alejandro DO      [place]: 2/15/2023 #30    Urine drug screen (if applicable):  None  Pain contract: None    LOV plan (if weaning or changing medications): Per Dr. Ronny Rainey note: \"Recommend medrol dosepack\"  No mention of Tramadol in this OV plan.

## 2023-04-06 ENCOUNTER — OFFICE VISIT (OUTPATIENT)
Dept: INTERNAL MEDICINE CLINIC | Facility: CLINIC | Age: 88
End: 2023-04-06

## 2023-04-06 VITALS
HEART RATE: 71 BPM | OXYGEN SATURATION: 95 % | WEIGHT: 214 LBS | BODY MASS INDEX: 35.65 KG/M2 | DIASTOLIC BLOOD PRESSURE: 71 MMHG | HEIGHT: 65 IN | RESPIRATION RATE: 17 BRPM | SYSTOLIC BLOOD PRESSURE: 122 MMHG | TEMPERATURE: 98 F

## 2023-04-06 DIAGNOSIS — N32.3 DIVERTICULUM OF BLADDER: ICD-10-CM

## 2023-04-06 DIAGNOSIS — E03.9 ACQUIRED HYPOTHYROIDISM: ICD-10-CM

## 2023-04-06 DIAGNOSIS — H25.13 AGE-RELATED NUCLEAR CATARACT OF BOTH EYES: ICD-10-CM

## 2023-04-06 DIAGNOSIS — I10 ESSENTIAL HYPERTENSION, BENIGN: ICD-10-CM

## 2023-04-06 DIAGNOSIS — F03.918 DEMENTIA WITH OTHER BEHAVIORAL DISTURBANCE, UNSPECIFIED DEMENTIA SEVERITY, UNSPECIFIED DEMENTIA TYPE (HCC): ICD-10-CM

## 2023-04-06 DIAGNOSIS — Z71.85 VACCINE COUNSELING: ICD-10-CM

## 2023-04-06 DIAGNOSIS — R41.3 MEMORY LOSS: Primary | ICD-10-CM

## 2023-04-06 DIAGNOSIS — E78.2 MIXED HYPERLIPIDEMIA: ICD-10-CM

## 2023-04-06 DIAGNOSIS — Z00.00 ENCOUNTER FOR ANNUAL HEALTH EXAMINATION: ICD-10-CM

## 2023-04-06 LAB
APPEARANCE: CLEAR
BILIRUBIN: NEGATIVE
GLUCOSE (URINE DIPSTICK): NEGATIVE MG/DL
KETONES (URINE DIPSTICK): NEGATIVE MG/DL
LEUKOCYTES: NEGATIVE
MULTISTIX LOT#: NORMAL NUMERIC
NITRITE, URINE: NEGATIVE
OCCULT BLOOD: NEGATIVE
PH, URINE: 7 (ref 4.5–8)
PROTEIN (URINE DIPSTICK): NEGATIVE MG/DL
SPECIFIC GRAVITY: 1.01 (ref 1–1.03)
URINE-COLOR: YELLOW
UROBILINOGEN,SEMI-QN: 0.2 MG/DL (ref 0–1.9)

## 2023-04-06 RX ORDER — ALCLOMETASONE DIPROPIONATE 0.5 MG/G
1 OINTMENT TOPICAL 2 TIMES DAILY
Qty: 60 G | Refills: 2 | Status: SHIPPED | OUTPATIENT
Start: 2023-04-06

## 2023-04-08 RX ORDER — MONTELUKAST SODIUM 10 MG/1
10 TABLET ORAL NIGHTLY
Qty: 90 TABLET | Refills: 3 | Status: SHIPPED | OUTPATIENT
Start: 2023-04-08

## 2023-04-08 NOTE — TELEPHONE ENCOUNTER
Refill passed per [facility] [facility] protocol.    Requested Prescriptions   Pending Prescriptions Disp Refills    MONTELUKAST 10 MG Oral Tab [Pharmacy Med Name: MONTELUKAST SOD 10 MG TABLET] 90 tablet 1     Sig: TAKE 1 TABLET BY MOUTH EVERY DAY AT NIGHT       Asthma & COPD Medication Protocol Passed - 4/8/2023 12:39 AM        Passed - In person appointment or virtual visit in the past 6 mos or appointment in next 3 mos     Recent Outpatient Visits              2 days ago Memory loss    [address], [address] [place], [place] MD Rena    Office Visit    1 month ago Dementia with other behavioral disturbance, unspecified dementia severity, unspecified dementia type ([facility]    [address], [address], [facility], APRN    Office Visit    1 month ago Right leg pain    [facility], [address], [place], [place]    Office Visit    3 months ago     [address]    Office Visit    4 months ago Chronic bilateral low back pain without sciatica    Emeka Kelly DO    Office Visit          Future Appointments         Provider Department Appt Notes    In 1 week Liz Morejon, [address], [address] back pain    In 6 months MD Kathleen Gomez [place] 6 month follow up                   Recent Outpatient Visits              2 days ago Memory loss    [address], [address] [place], [place] MD Rena    Office Visit    1 month ago Dementia with other behavioral disturbance, unspecified dementia severity, unspecified dementia type [facility])    [address], [address], [facility], APRN    Office Visit    1 month ago Right leg pain    [facility], [address], [place], [place] Office Visit    3 months ago     [address] [place]    Office Visit    4 months ago Chronic bilateral low back pain without sciatica    [facility], [address], [place]    Office Visit           Future Appointments         Provider Department Appt Notes    In 1 week Eloy Recio,  [address], [address] back pain    In 6 months Roise Meigs., MD Steven Newborn, [place] 6 month follow up

## 2023-04-15 DIAGNOSIS — G89.29 CHRONIC BILATERAL LOW BACK PAIN WITHOUT SCIATICA: ICD-10-CM

## 2023-04-15 DIAGNOSIS — M54.50 CHRONIC BILATERAL LOW BACK PAIN WITHOUT SCIATICA: ICD-10-CM

## 2023-04-17 RX ORDER — TRAMADOL HYDROCHLORIDE 50 MG/1
TABLET ORAL
Qty: 30 TABLET | Refills: 0 | Status: SHIPPED | OUTPATIENT
Start: 2023-04-17

## 2023-04-18 ENCOUNTER — OFFICE VISIT (OUTPATIENT)
Dept: PHYSICAL MEDICINE AND REHAB | Facility: CLINIC | Age: 88
End: 2023-04-18
Payer: COMMERCIAL

## 2023-04-18 VITALS
SYSTOLIC BLOOD PRESSURE: 104 MMHG | HEIGHT: 65 IN | BODY MASS INDEX: 35.65 KG/M2 | WEIGHT: 214 LBS | DIASTOLIC BLOOD PRESSURE: 60 MMHG

## 2023-04-18 DIAGNOSIS — M54.50 CHRONIC BILATERAL LOW BACK PAIN WITHOUT SCIATICA: Primary | ICD-10-CM

## 2023-04-18 DIAGNOSIS — R26.89 FUNCTIONAL GAIT ABNORMALITY: ICD-10-CM

## 2023-04-18 DIAGNOSIS — M21.372 ACQUIRED LEFT FOOT DROP: ICD-10-CM

## 2023-04-18 DIAGNOSIS — G89.29 CHRONIC BILATERAL LOW BACK PAIN WITHOUT SCIATICA: Primary | ICD-10-CM

## 2023-04-18 PROCEDURE — 3008F BODY MASS INDEX DOCD: CPT | Performed by: PHYSICAL MEDICINE & REHABILITATION

## 2023-04-18 PROCEDURE — 3074F SYST BP LT 130 MM HG: CPT | Performed by: PHYSICAL MEDICINE & REHABILITATION

## 2023-04-18 PROCEDURE — 99213 OFFICE O/P EST LOW 20 MIN: CPT | Performed by: PHYSICAL MEDICINE & REHABILITATION

## 2023-04-18 PROCEDURE — 1126F AMNT PAIN NOTED NONE PRSNT: CPT | Performed by: PHYSICAL MEDICINE & REHABILITATION

## 2023-04-18 PROCEDURE — 3078F DIAST BP <80 MM HG: CPT | Performed by: PHYSICAL MEDICINE & REHABILITATION

## 2023-05-24 ENCOUNTER — LAB ENCOUNTER (OUTPATIENT)
Dept: LAB | Age: 88
End: 2023-05-24
Attending: INTERNAL MEDICINE
Payer: MEDICARE

## 2023-05-24 DIAGNOSIS — E78.2 MIXED HYPERLIPIDEMIA: ICD-10-CM

## 2023-05-24 LAB
CHOLEST SERPL-MCNC: 120 MG/DL (ref ?–200)
FASTING PATIENT LIPID ANSWER: YES
HDLC SERPL-MCNC: 50 MG/DL (ref 40–59)
LDLC SERPL CALC-MCNC: 44 MG/DL (ref ?–100)
NONHDLC SERPL-MCNC: 70 MG/DL (ref ?–130)
TRIGL SERPL-MCNC: 155 MG/DL (ref 30–149)
VLDLC SERPL CALC-MCNC: 22 MG/DL (ref 0–30)

## 2023-05-24 PROCEDURE — 80061 LIPID PANEL: CPT

## 2023-05-24 PROCEDURE — 36415 COLL VENOUS BLD VENIPUNCTURE: CPT

## 2023-05-31 DIAGNOSIS — F03.90 DEMENTIA (HCC): ICD-10-CM

## 2023-05-31 DIAGNOSIS — E03.9 HYPOTHYROIDISM: ICD-10-CM

## 2023-05-31 DIAGNOSIS — R41.3 MEMORY LOSS: ICD-10-CM

## 2023-05-31 RX ORDER — SIMVASTATIN 20 MG
20 TABLET ORAL NIGHTLY
Qty: 90 TABLET | Refills: 3 | Status: SHIPPED | OUTPATIENT
Start: 2023-05-31

## 2023-06-01 NOTE — TELEPHONE ENCOUNTER
Refill passed per 2 Minutes, [facility] protocol.   Requested Prescriptions   Pending Prescriptions Disp Refills    SIMVASTATIN 20 MG Oral Tab [Pharmacy Med Name: SIMVASTATIN 20 MG TABLET] 90 tablet 1     Sig: TAKE 1 TABLET BY MOUTH EVERY DAY AT NIGHT       Cholesterol Medication Protocol Passed - 5/31/2023  9:18 AM        Passed - ALT in past 12 months        Passed - LDL in past 12 months        Passed - Last ALT < 80     Lab Results   Component Value Date    ALT 35 07/07/2022             Passed - Last LDL < 130     Lab Results   Component Value Date    LDL 44 05/24/2023               Passed - In person appointment or virtual visit in the past 12 mos or appointment in next 3 mos     Recent Outpatient Visits              1 month ago Chronic bilateral low back pain without sciatica    [address], [address], [place],     Office Visit    1 month ago Memory loss    [address], [address], Sony Avery MD    Office Visit    2 months ago Dementia with other behavioral disturbance, unspecified dementia severity, unspecified dementia type [facility])    [address], [address], Jana Choe APRN    Office Visit    3 months ago Right leg pain    [facility], [address], [place] DO    Office Visit    5 months ago     [address]    Office Visit          Future Appointments         Provider Department Appt Notes    In 4 months Roise Meigs., MD [address] [facility] [place] 6 month follow up                  Recent Outpatient Visits              1 month ago Chronic bilateral low back pain without sciatica    [address] [facility], [place], DO    Office Visit    1 month ago Memory loss    [address], [address], Everardo Goyal MD    Office Visit    2 months ago Dementia with other behavioral disturbance, unspecified dementia severity, unspecified dementia type [facility])    [address], [address], Jana Choe APRN    Office Visit    3 months ago Right leg pain    [address] [facility], [place] DO    Office Visit    5 months ago     [address] [place] [address]    Office Visit          Future Appointments         Provider Department Appt Notes    In 4 months MD Satya [facility]-[place] [facility], [address], [place] 6 month follow up

## 2023-06-21 ENCOUNTER — HOSPITAL ENCOUNTER (EMERGENCY)
Facility: HOSPITAL | Age: 88
Discharge: HOME | End: 2023-06-21
Attending: EMERGENCY MEDICINE
Payer: MEDICARE

## 2023-06-21 VITALS
SYSTOLIC BLOOD PRESSURE: 142 MMHG | TEMPERATURE: 98 F | RESPIRATION RATE: 22 BRPM | OXYGEN SATURATION: 95 % | BODY MASS INDEX: 36 KG/M2 | HEART RATE: 86 BPM | DIASTOLIC BLOOD PRESSURE: 77 MMHG | WEIGHT: 213.88 LBS

## 2023-06-21 DIAGNOSIS — B02.9 HERPES ZOSTER WITHOUT COMPLICATION: Primary | ICD-10-CM

## 2023-06-21 LAB
ATRIAL RATE: 84 BPM
CREAT BLD-MCNC: 1.18 MG/DL
GFR SERPLBLD BASED ON 1.73 SQ M-ARVRAT: 58 ML/MIN/1.73M2 (ref 60–?)
P AXIS: 34 DEGREES
P-R INTERVAL: 192 MS
Q-T INTERVAL: 378 MS
QRS DURATION: 90 MS
QTC CALCULATION (BEZET): 446 MS
R AXIS: 15 DEGREES
T AXIS: 76 DEGREES
VENTRICULAR RATE: 84 BPM

## 2023-06-21 PROCEDURE — 93010 ELECTROCARDIOGRAM REPORT: CPT

## 2023-06-21 PROCEDURE — 93005 ELECTROCARDIOGRAM TRACING: CPT

## 2023-06-21 PROCEDURE — 82565 ASSAY OF CREATININE: CPT | Performed by: EMERGENCY MEDICINE

## 2023-06-21 PROCEDURE — 99283 EMERGENCY DEPT VISIT LOW MDM: CPT

## 2023-06-21 PROCEDURE — 36415 COLL VENOUS BLD VENIPUNCTURE: CPT

## 2023-06-21 PROCEDURE — 99284 EMERGENCY DEPT VISIT MOD MDM: CPT

## 2023-06-21 RX ORDER — VALACYCLOVIR HYDROCHLORIDE 1 G/1
1000 TABLET, FILM COATED ORAL 2 TIMES DAILY
Qty: 14 TABLET | Refills: 0 | Status: SHIPPED | OUTPATIENT
Start: 2023-06-21 | End: 2023-06-28

## 2023-06-21 NOTE — ED QUICK NOTES
PT to ED with adult daughter. Daughter reports pain in R arm since 0800 this morning, atraumatic, rash forming on arm, denies chest pain, dyspnea, nausea, vomiting, diarrhea, constipation, blood in urine, and blood in stool. Daughter reports hx of dementia, pt alert to self, speaks [address] and [place].

## 2023-06-22 ENCOUNTER — TELEPHONE (OUTPATIENT)
Dept: INTERNAL MEDICINE CLINIC | Facility: CLINIC | Age: 88
End: 2023-06-22

## 2023-06-22 NOTE — TELEPHONE ENCOUNTER
Daughter, states that the patient was seen at the [facility] yesterday and was told to follow up with his pcp on Monday 6-26-23. Daughter, would like to schedule an appointment with Dr. Cali Powell. There are no available appointments, would like the doctor like to add the patient? If so, what time on Monday.

## 2023-06-27 ENCOUNTER — OFFICE VISIT (OUTPATIENT)
Dept: INTERNAL MEDICINE CLINIC | Facility: CLINIC | Age: 88
End: 2023-06-27

## 2023-06-27 VITALS
HEIGHT: 65 IN | RESPIRATION RATE: 17 BRPM | OXYGEN SATURATION: 98 % | HEART RATE: 76 BPM | SYSTOLIC BLOOD PRESSURE: 126 MMHG | BODY MASS INDEX: 36.15 KG/M2 | WEIGHT: 217 LBS | TEMPERATURE: 98 F | DIASTOLIC BLOOD PRESSURE: 76 MMHG

## 2023-06-27 DIAGNOSIS — B02.23 NEUROPATHY DUE TO HERPES ZOSTER: Primary | ICD-10-CM

## 2023-06-27 PROCEDURE — 3078F DIAST BP <80 MM HG: CPT | Performed by: INTERNAL MEDICINE

## 2023-06-27 PROCEDURE — 3074F SYST BP LT 130 MM HG: CPT | Performed by: INTERNAL MEDICINE

## 2023-06-27 PROCEDURE — 3008F BODY MASS INDEX DOCD: CPT | Performed by: INTERNAL MEDICINE

## 2023-06-27 PROCEDURE — 1125F AMNT PAIN NOTED PAIN PRSNT: CPT | Performed by: INTERNAL MEDICINE

## 2023-06-27 PROCEDURE — 99214 OFFICE O/P EST MOD 30 MIN: CPT | Performed by: INTERNAL MEDICINE

## 2023-06-27 PROCEDURE — 1160F RVW MEDS BY RX/DR IN RCRD: CPT | Performed by: INTERNAL MEDICINE

## 2023-06-27 PROCEDURE — 1159F MED LIST DOCD IN RCRD: CPT | Performed by: INTERNAL MEDICINE

## 2023-06-27 RX ORDER — GABAPENTIN 100 MG/1
CAPSULE ORAL
Qty: 30 CAPSULE | Refills: 0 | Status: SHIPPED | OUTPATIENT
Start: 2023-06-27

## 2023-07-14 DIAGNOSIS — G89.29 CHRONIC BILATERAL LOW BACK PAIN WITHOUT SCIATICA: ICD-10-CM

## 2023-07-14 DIAGNOSIS — M54.50 CHRONIC BILATERAL LOW BACK PAIN WITHOUT SCIATICA: ICD-10-CM

## 2023-07-14 RX ORDER — TRAMADOL HYDROCHLORIDE 50 MG/1
50 TABLET ORAL NIGHTLY PRN
Qty: 30 TABLET | Refills: 0 | Status: SHIPPED | OUTPATIENT
Start: 2023-07-14

## 2023-07-14 NOTE — TELEPHONE ENCOUNTER
Refill Request    Medication request: traMADol 50 MG Oral Tab . Take 1 tablet (50 mg total) by mouth nightly as needed for Pain. LOV:4/18/2023 Reyna Florez,    Due back to clinic per last office note:  Per Dr Bee Bradshaw \"Follow up in 6 months or earlier PRN. \"  NOV: Visit date not found      [place]: 06/13/2023 #30 tab 30 days    Urine drug screen (if applicable): none  Pain contract: none    LOV plan (if weaning or changing medications): Per Dr Bee Bradshaw \"Continue tramadol 50mg at bedtime PRN pain. \"

## 2023-07-28 NOTE — PROGRESS NOTES
ADULT SWALLOWING EVALUATION:   Referring Physician: Dr. Harris Search  Oropharyngeal Dysphagia  Date of Service: 8/6/2018     PATIENT SUMMARY:   Mayito Macario is a 80year old y/o male who presents to therapy today with complaints of increased mastication time respiratory effort ) across trials of soft solids and thin liquids via cup and straw. Pt with baseline throat clearing and difficult to determine if this is a persistent behavior or a result of pharyngeal pooling/dysfunction.   At this time, pt safely mateus small sips, small bites, multiple swallows  Esophageal Phase of Swallow: No complaints consistent with esophageal involvement     Today's Treatment and Response:  Patient education provided on [address].   Patient was instructed in and issued a HEP for swallow while under my care.         X______________________________________________ Date________________  Certification From: 0/0/1664      To: 11/4/2018 no

## 2023-08-10 ENCOUNTER — TELEPHONE (OUTPATIENT)
Dept: PHYSICAL THERAPY | Facility: HOSPITAL | Age: 88
End: 2023-08-10

## 2023-08-11 ENCOUNTER — OFFICE VISIT (OUTPATIENT)
Dept: PHYSICAL THERAPY | Age: 88
End: 2023-08-11
Attending: PHYSICAL MEDICINE & REHABILITATION
Payer: MEDICARE

## 2023-08-11 DIAGNOSIS — R26.89 FUNCTIONAL GAIT ABNORMALITY: ICD-10-CM

## 2023-08-11 DIAGNOSIS — M54.50 CHRONIC BILATERAL LOW BACK PAIN WITHOUT SCIATICA: Primary | ICD-10-CM

## 2023-08-11 DIAGNOSIS — G89.29 CHRONIC BILATERAL LOW BACK PAIN WITHOUT SCIATICA: Primary | ICD-10-CM

## 2023-08-11 PROCEDURE — 97161 PT EVAL LOW COMPLEX 20 MIN: CPT

## 2023-08-11 PROCEDURE — 97530 THERAPEUTIC ACTIVITIES: CPT

## 2023-08-11 PROCEDURE — 97110 THERAPEUTIC EXERCISES: CPT

## 2023-08-11 PROCEDURE — 97112 NEUROMUSCULAR REEDUCATION: CPT

## 2023-08-11 NOTE — PROGRESS NOTES
SPINE EVALUATION:     Diagnosis:         Referring Provider: Trisha Starr  Date of Evaluation:    8/11/2023    Precautions:  None Next MD visit:   none scheduled  Date of Surgery: n/a     PATIENT SUMMARY   Yovany Salinas is a 80year old male who presents to therapy today with complaints of LBP. Has had two surgeries for stenosis per daughter report in last 8 years. Used to go down R leg. No feet pain. Unsure if N/T. Balance has been an issue as well. Aggravated with sitting too long per daughter  Alleviated with tramadol  24hr pattern is worse in AM and end of day. 0/10, at best 0/10, at worst 6/10. Current functional limitations include walking. April Mckeon describes prior level of function used to have less pain with transitions and walking. . Pt goals include less pain/improved balance. Past medical history was reviewed with April Mckeon. Significant findings include 1 fall 6 months ago, alzheimer's, 2 back surgeries, HTN, prostate problems. Pt denies diplopia, dysarthria, dysphasia, dizziness, drop attacks, bowel/bladder changes, saddle anesthesia, and ZAK LE N/T.    Lizzie Puente presents to physical therapy evaluation with primary c/o LBP and balance. The results of the objective tests and measures show L closing pattern, propioceptive deficits, LE weakness. Functional deficits include but are not limited to walking, transitions, stairs. Signs and symptoms are consistent with diagnosis of stenotic back pain with propioceptive deficits. Pt and PT discussed evaluation findings, pathology, POC and HEP. Pt voiced understanding and performs HEP correctly without reported pain. Skilled Physical Therapy is medically necessary to address the above impairments and reach functional goals.      OBJECTIVE:   Observation/Posture: PPT  Neuro Screen: Intact    Lumbar AROM: (* denotes performed with pain)  Flexion: 50%  Extension: 0*  Sidebending: R WFL; L 50%*  Rotation: R WFL; L 50%*    Accessory motion: NA  Palpation: Unremarkable    Strength: (* denotes performed with pain)  STS with UE sherrell  Quads and hip abductors 3+/5      Special tests:   TUG 35s    Gait: pt ambulates on level ground with  SPC . Balance: faom pad increased sway, semi tandem [facility] Treatment and Response:   Pt education was provided on exam findings, treatment diagnosis, treatment plan, expectations, and prognosis. Pt was also provided recommendations for activity modifications  Patient was instructed in and issued a HEP for: forward bends and STS    Charges: PT Eval Low Complexity, TE, MT, Neuro      Total Timed Treatment: 45 min     Total Treatment Time: 45 min     Based on clinical rationale and outcome measures, this evaluation involved Low Complexity decision making due to 1-2 personal factors/comorbidities, 2 body structures involved/activity limitations, and stable symptoms. PLAN OF CARE:    Goals: (to be met in 12 visits)   1) Pt competent with HEP. 2) Pt demonstrate lumbar ROM WFL without pain above 1/10. 3) Pt demonstrate normalized lumbopelvic motor control with good TA and glute strength and activation. 4)Pt demonstrate 5/5 LE strength testing for gait and function. 5) Pt return to ADL function w/o further back limitations above 1/10. 6) Current STEVIE 58% Goal 20%      Frequency / Duration: Patient will be seen for 2 x/week or a total of 12 visits over a 90 day period. Treatment will include: Manual Therapy, Neuromuscular Re-education, Therapeutic Activities, Therapeutic Exercise, and Modalities to include: Electrical stimulation (unattended)    Education or treatment limitation: None  Rehab Potential:good        Patient/Family/Caregiver was advised of these findings, precautions, and treatment options and has agreed to actively participate in planning and for this course of care. Thank you for your referral. Please co-sign or sign and return this letter via fax as soon as possible to [phone].  If you have any questions, please contact me at Dept: [phone]    Sincerely,  Electronically signed by therapist: Vasquez Shook    Physician's certification required: Yes  I certify the need for these services furnished under this plan of treatment and while under my care.     X___________________________________________________ Date____________________    Certification From: 0/65/7346  To:11/9/2023

## 2023-08-13 DIAGNOSIS — G89.29 CHRONIC BILATERAL LOW BACK PAIN WITHOUT SCIATICA: ICD-10-CM

## 2023-08-13 DIAGNOSIS — M54.50 CHRONIC BILATERAL LOW BACK PAIN WITHOUT SCIATICA: ICD-10-CM

## 2023-08-15 ENCOUNTER — OFFICE VISIT (OUTPATIENT)
Dept: PHYSICAL THERAPY | Age: 88
End: 2023-08-15
Attending: PHYSICAL MEDICINE & REHABILITATION
Payer: MEDICARE

## 2023-08-15 PROCEDURE — 97112 NEUROMUSCULAR REEDUCATION: CPT

## 2023-08-15 PROCEDURE — 97110 THERAPEUTIC EXERCISES: CPT

## 2023-08-15 PROCEDURE — 97140 MANUAL THERAPY 1/> REGIONS: CPT

## 2023-08-15 RX ORDER — TRAMADOL HYDROCHLORIDE 50 MG/1
50 TABLET ORAL NIGHTLY PRN
Qty: 30 TABLET | Refills: 0 | Status: SHIPPED | OUTPATIENT
Start: 2023-08-15

## 2023-08-15 NOTE — PROGRESS NOTES
Diagnosis:         Referring Provider: [facility]  Date of Evaluation:    8/11/2023    Precautions:  None Next MD visit:   none scheduled  Date of Surgery: n/a   nsurance Primary/Secondary: [facility] MEDICARE / N/A     # Auth Visits: No limit            Subjective: Ready for PT; no back soreness    Pain: 0/10      Objective: TUG >30s      Assessment: Visit2  Addressed static and dynamic balance deficits via cone weaves and foam pad balance in semi tandem. CGA w/ gait belt throughout. Goals:   (to be met in 12 visits)   1) Pt competent with HEP. 2) Pt demonstrate lumbar ROM WFL without pain above 1/10. 3) Pt demonstrate normalized lumbopelvic motor control with good TA and glute strength and activation. 4)Pt demonstrate 5/5 LE strength testing for gait and function. 5) Pt return to ADL function w/o further back limitations above 1/10.   6) Current STEVIE 58% Goal 20%    Plan: Contiue balance and LE strength      Manual -Paraspinal STM   Therex --STS  -Bridges  -LAQ   Neuro -Foam pad balance  -Semi tandem balance  -Cone weaves   Theract    Estim    HEP: Seated forward bends    Charges: TE 15m Neuro 15m MT 10m       Total Timed Treatment: 40 min  Total Treatment Time: 40 min

## 2023-08-22 ENCOUNTER — OFFICE VISIT (OUTPATIENT)
Dept: PHYSICAL THERAPY | Age: 88
End: 2023-08-22
Attending: PHYSICAL MEDICINE & REHABILITATION
Payer: MEDICARE

## 2023-08-22 PROCEDURE — 97110 THERAPEUTIC EXERCISES: CPT

## 2023-08-22 PROCEDURE — 97014 ELECTRIC STIMULATION THERAPY: CPT

## 2023-08-22 PROCEDURE — 97140 MANUAL THERAPY 1/> REGIONS: CPT

## 2023-08-22 PROCEDURE — 97112 NEUROMUSCULAR REEDUCATION: CPT

## 2023-08-22 NOTE — PROGRESS NOTES
Diagnosis:         Referring Provider: Isamar Cadena  Date of Evaluation:    8/11/2023    Precautions:  None Next MD visit:   none scheduled  Date of Surgery: n/a   nsurance Primary/Secondary: [employer] MEDICARE / N/A     # Auth Visits: No limit            Subjective: Pt daughter states his back was hurting him last night. Pain: 0/10      Objective: TUG >30s      Assessment: Visit 3  Pt very unsteady on foam pad step overs. Mod A to maintain balance. We will have to work on this statically prior to adding dynamic challenge. Goals:   (to be met in 12 visits)   1) Pt competent with HEP. 2) Pt demonstrate lumbar ROM WFL without pain above 1/10. 3) Pt demonstrate normalized lumbopelvic motor control with good TA and glute strength and activation. 4)Pt demonstrate 5/5 LE strength testing for gait and function. 5) Pt return to ADL function w/o further back limitations above 1/10.   6) Current STEVIE 58% Goal 20%    Plan: Contiue balance and LE strength      Manual -Paraspinal STM   Therex --STS  -Bridges  -LAQ   Neuro -Faom pad step overs- very unsteady discontinue for time being  Foam pad balance  -Semi tandem balance  -Cone weaves  -Marches   Theract    Estim During manual   HEP: Seated forward bends, walking    Charges: TE 15m Neuro 15m MT 10m  ESTIM     Total Timed Treatment: 40 min  Total Treatment Time: 40 min
electronic

## 2023-08-29 ENCOUNTER — APPOINTMENT (OUTPATIENT)
Dept: PHYSICAL THERAPY | Age: 88
End: 2023-08-29
Attending: PHYSICAL MEDICINE & REHABILITATION
Payer: MEDICARE

## 2023-09-03 ENCOUNTER — OFFICE VISIT (OUTPATIENT)
Dept: FAMILY MEDICINE CLINIC | Facility: CLINIC | Age: 88
End: 2023-09-03
Payer: COMMERCIAL

## 2023-09-03 VITALS
DIASTOLIC BLOOD PRESSURE: 53 MMHG | BODY MASS INDEX: 36.39 KG/M2 | OXYGEN SATURATION: 95 % | WEIGHT: 218.38 LBS | SYSTOLIC BLOOD PRESSURE: 122 MMHG | HEIGHT: 65 IN | HEART RATE: 84 BPM | TEMPERATURE: 98 F | RESPIRATION RATE: 18 BRPM

## 2023-09-03 DIAGNOSIS — J06.9 UPPER RESPIRATORY TRACT INFECTION, UNSPECIFIED TYPE: Primary | ICD-10-CM

## 2023-09-03 LAB
OPERATOR ID: NORMAL
POCT LOT NUMBER: NORMAL
RAPID SARS-COV-2 BY PCR: NOT DETECTED

## 2023-09-05 ENCOUNTER — APPOINTMENT (OUTPATIENT)
Dept: PHYSICAL THERAPY | Age: 88
End: 2023-09-05
Attending: PHYSICAL MEDICINE & REHABILITATION
Payer: MEDICARE

## 2023-09-11 ENCOUNTER — APPOINTMENT (OUTPATIENT)
Dept: PHYSICAL THERAPY | Age: 88
End: 2023-09-11
Attending: PHYSICAL MEDICINE & REHABILITATION
Payer: MEDICARE

## 2023-09-11 DIAGNOSIS — G89.29 CHRONIC BILATERAL LOW BACK PAIN WITHOUT SCIATICA: ICD-10-CM

## 2023-09-11 DIAGNOSIS — M54.50 CHRONIC BILATERAL LOW BACK PAIN WITHOUT SCIATICA: ICD-10-CM

## 2023-09-12 RX ORDER — TRAMADOL HYDROCHLORIDE 50 MG/1
50 TABLET ORAL NIGHTLY PRN
Qty: 30 TABLET | Refills: 0 | Status: SHIPPED | OUTPATIENT
Start: 2023-09-14

## 2023-09-20 ENCOUNTER — APPOINTMENT (OUTPATIENT)
Dept: PHYSICAL THERAPY | Age: 88
End: 2023-09-20
Attending: PHYSICAL MEDICINE & REHABILITATION
Payer: MEDICARE

## 2023-09-20 ENCOUNTER — OFFICE VISIT (OUTPATIENT)
Dept: INTERNAL MEDICINE CLINIC | Facility: CLINIC | Age: 88
End: 2023-09-20

## 2023-09-20 VITALS
BODY MASS INDEX: 36.32 KG/M2 | HEIGHT: 65 IN | WEIGHT: 218 LBS | TEMPERATURE: 98 F | DIASTOLIC BLOOD PRESSURE: 72 MMHG | HEART RATE: 77 BPM | OXYGEN SATURATION: 94 % | SYSTOLIC BLOOD PRESSURE: 135 MMHG

## 2023-09-20 DIAGNOSIS — R05.9 COUGH, UNSPECIFIED TYPE: Primary | ICD-10-CM

## 2023-09-20 PROCEDURE — 3078F DIAST BP <80 MM HG: CPT | Performed by: INTERNAL MEDICINE

## 2023-09-20 PROCEDURE — 90662 IIV NO PRSV INCREASED AG IM: CPT | Performed by: INTERNAL MEDICINE

## 2023-09-20 PROCEDURE — 1160F RVW MEDS BY RX/DR IN RCRD: CPT | Performed by: INTERNAL MEDICINE

## 2023-09-20 PROCEDURE — 1126F AMNT PAIN NOTED NONE PRSNT: CPT | Performed by: INTERNAL MEDICINE

## 2023-09-20 PROCEDURE — 99214 OFFICE O/P EST MOD 30 MIN: CPT | Performed by: INTERNAL MEDICINE

## 2023-09-20 PROCEDURE — 3008F BODY MASS INDEX DOCD: CPT | Performed by: INTERNAL MEDICINE

## 2023-09-20 PROCEDURE — 1159F MED LIST DOCD IN RCRD: CPT | Performed by: INTERNAL MEDICINE

## 2023-09-20 PROCEDURE — 3075F SYST BP GE 130 - 139MM HG: CPT | Performed by: INTERNAL MEDICINE

## 2023-09-20 PROCEDURE — G0008 ADMIN INFLUENZA VIRUS VAC: HCPCS | Performed by: INTERNAL MEDICINE

## 2023-09-20 RX ORDER — FLUTICASONE PROPIONATE 50 MCG
2 SPRAY, SUSPENSION (ML) NASAL DAILY
Qty: 1 EACH | Refills: 0 | Status: SHIPPED | OUTPATIENT
Start: 2023-09-20

## 2023-09-20 NOTE — PROGRESS NOTES
Subjective:     Patient ID: Tasha Rod is a 80year old male. Cough  Associated symptoms include a rash. Rash  Associated symptoms include coughing. History/Other: He was brought in today by his daughter because of cough. According to the patient he had a cold symptoms beginning of September she took her to an urgent care they told her that is a viral infection. she states that he is feeling better now. But he still has cough when he lays. Daughter is concerned because he needs to get his flu shot. Patient is poor historian he has dementia. According to the daughter is also scratching his legs all the time  Review of Systems   Constitutional: Negative. HENT: Negative. Respiratory:  Positive for cough. Cardiovascular: Negative. Genitourinary: Negative. Skin:  Positive for rash. Neurological: Negative. Hematological: Negative. Psychiatric/Behavioral: Negative. Current Outpatient Medications   Medication Sig Dispense Refill    traMADol 50 MG Oral Tab Take 1 tablet (50 mg total) by mouth nightly as needed for Pain. 30 tablet 0    tamsulosin 0.4 MG Oral Cap Take 1 capsule (0.4 mg total) by mouth daily. 90 capsule 3    losartan 25 MG Oral Tab Take 1 tablet (25 mg total) by mouth daily. 90 tablet 3    simvastatin 20 MG Oral Tab Take 1 tablet (20 mg total) by mouth nightly. 90 tablet 3    omeprazole 20 MG Oral Capsule Delayed Release Take 1 capsule (20 mg total) by mouth daily. 90 capsule 3    finasteride 5 MG Oral Tab Take 1 tablet (5 mg total) by mouth daily. 90 tablet 3    montelukast 10 MG Oral Tab Take 1 tablet (10 mg total) by mouth nightly. 90 tablet 3    Memantine HCl ER 28 MG Oral Capsule SR 24 Hr Take 1 capsule (28 mg total) by mouth daily. QUEtiapine Fumarate 25 MG Oral Tab TAKE 1 TABLET BY MOUTH AT BEDTIME CAN GIVE ADDITIONAL TABLET AS NEEDED FOR AGITATION 180 tablet 0    aspirin 81 MG Oral Tab Take 1 tablet (81 mg total) by mouth daily. acetaminophen (TYLENOL EXTRA STRENGTH) 500 MG Oral Tab Take 1 tablet (500 mg total) by mouth every 6 (six) hours as needed for Pain. Vitamin D3 (VITAMIN D3) 2000 UNITS Oral Cap Take 1 capsule (2,000 Units total) by mouth daily. Vitamin B-12 (VITAMIN B12) 1000 MCG Oral Tab Take 1 tablet (1,000 mcg total) by mouth daily. Alclometasone Dipropionate 0.05 % External Ointment Apply 1 Application topically 2 (two) times daily. (Patient not taking: Reported on 9/3/2023) 60 g 2    Meloxicam 7.5 MG Oral Tab 1 tab PO qDaily x 5 days, then 1 tab PO qDaily PRN pain. Take with food. (Patient not taking: Reported on 9/3/2023) 14 tablet 0    triamcinolone acetonide 0.1 % External Cream Apply topically 2 (two) times daily.  (Patient not taking: Reported on 9/3/2023)       Allergies:  Pcn [Penicillins]       NAUSEA AND VOMITING  Seasonal                OTHER (SEE COMMENTS)    Comment:Watery eyes, coughing, sneezing    Past Medical History:   Diagnosis Date    Acid reflux     Allergic rhinitis     Anxiety     Arthritis     Cataract 2008    OU    Dementia ([address]     Depression     Dermatochalasis of both eyelids     OU    Elevated PSA 2000    Negative biopsies    Elevated PSA 2003    and free PSA at 19%-Negative biopsies    Essential hypertension     Floaters 2008    OD    Foot drop 2008    w/ weakness    Obesity     Other and unspecified hyperlipidemia     Pinguecula of both eyes     OU    Stomach ulcer 1969    per: NG-bleeding    Traumatic brain injury [facility])       Past Surgical History:   Procedure Laterality Date    BACK SURGERY      2 back surgeries    COLONOSCOPY  2000,2007    per: NG    COLONOSCOPY      OTHER SURGICAL HISTORY  2000, 2003    x2 negative prostate biopsies      Family History   Problem Relation Age of Onset    Cancer Sister         metastatic cancer (brain)    Heart Disease Father 80        CAD-(cause of death)    Cancer Sister 40        bone cancer (cause of death)    Other (Other) Other         No vascular disease    Asthma Daughter     Obesity Daughter     Glaucoma Neg     Diabetes Neg     Macular degeneration Neg       Social History:   Social History     Socioeconomic History    Marital status:    Tobacco Use    Smoking status: Former     Packs/day: 0.00     Years: 20.00     Additional pack years: 0.00     Total pack years: 0.00     Types: Cigarettes     Quit date: 1975     Years since quittin.7    Smokeless tobacco: Never   Vaping Use    Vaping Use: Never used   Substance and Sexual Activity    Alcohol use: No    Drug use: No   Other Topics Concern    Caffeine Concern Yes     Comment: decaf coffee,tea-1 cup/day    Exercise No    Reaction to local anesthetic No   Social History Narrative    The patient uses the following assistive device(s):  single-point cane. The patient does live in a home with stairs. Objective:   Physical Exam  Vitals and nursing note reviewed. Constitutional:       Appearance: Normal appearance. HENT:      Head: Normocephalic and atraumatic. Cardiovascular:      Rate and Rhythm: Normal rate and regular rhythm. Pulses: Normal pulses. Heart sounds: Normal heart sounds. Pulmonary:      Effort: Pulmonary effort is normal.      Breath sounds: Normal breath sounds. Abdominal:      Palpations: Abdomen is soft. Musculoskeletal:      Cervical back: Normal range of motion and neck supple. Skin:     General: Skin is warm. Neurological:      Mental Status: He is alert. Assessment & Plan:   No diagnosis found. Cough most likely due to postnasal drip, exam is normal, np note reviewed continue with Singulair, advised to take some Claritin over-the-counter and I will send Flonase nasal spray      Itchiness on the legs / use otc benadryl if persist follow up  No orders of the defined types were placed in this encounter.       Meds This Visit:  Requested Prescriptions      No prescriptions requested or ordered in this encounter Imaging & Referrals:  None

## 2023-09-26 ENCOUNTER — APPOINTMENT (OUTPATIENT)
Dept: PHYSICAL THERAPY | Age: 88
End: 2023-09-26
Attending: PHYSICAL MEDICINE & REHABILITATION
Payer: MEDICARE

## 2023-10-05 ENCOUNTER — OFFICE VISIT (OUTPATIENT)
Dept: OTOLARYNGOLOGY | Facility: CLINIC | Age: 88
End: 2023-10-05

## 2023-10-05 VITALS — BODY MASS INDEX: 36.32 KG/M2 | WEIGHT: 218 LBS | HEIGHT: 65 IN

## 2023-10-05 DIAGNOSIS — H61.23 CERUMEN DEBRIS ON TYMPANIC MEMBRANE OF BOTH EARS: Primary | ICD-10-CM

## 2023-10-11 DIAGNOSIS — G89.29 CHRONIC BILATERAL LOW BACK PAIN WITHOUT SCIATICA: ICD-10-CM

## 2023-10-11 DIAGNOSIS — M54.50 CHRONIC BILATERAL LOW BACK PAIN WITHOUT SCIATICA: ICD-10-CM

## 2023-10-12 NOTE — TELEPHONE ENCOUNTER
Refill Request    Medication request: traMADol 50 MG Oral Tab  Take 1 tablet (50 mg total) by mouth nightly as needed for Pain. LOV:4/18/2023 Christine Moscoso DO   Due back to clinic per last office note:  \"Follow up in 6 months or earlier PRN. \"  NOV: 10/17/2023 Christine Moscoso DO      [place]: 9/12/2023 #30    Urine drug screen (if applicable): None  Pain contract: None    LOV plan (if weaning or changing medications): Per Dr. Jenise Dougherty note: \"Continue tramadol 50mg at bedtime PRN pain.  \"

## 2023-10-13 RX ORDER — TRAMADOL HYDROCHLORIDE 50 MG/1
50 TABLET ORAL NIGHTLY PRN
Qty: 30 TABLET | Refills: 0 | Status: SHIPPED | OUTPATIENT
Start: 2023-10-13

## 2023-10-14 RX ORDER — FLUTICASONE PROPIONATE 50 MCG
2 SPRAY, SUSPENSION (ML) NASAL DAILY
Qty: 1 EACH | Refills: 3 | Status: SHIPPED | OUTPATIENT
Start: 2023-10-14

## 2023-10-17 ENCOUNTER — OFFICE VISIT (OUTPATIENT)
Dept: PHYSICAL MEDICINE AND REHAB | Facility: CLINIC | Age: 88
End: 2023-10-17

## 2023-10-17 VITALS — HEART RATE: 57 BPM | OXYGEN SATURATION: 95 % | BODY MASS INDEX: 37 KG/M2 | WEIGHT: 220 LBS

## 2023-10-17 DIAGNOSIS — M54.50 CHRONIC BILATERAL LOW BACK PAIN WITHOUT SCIATICA: ICD-10-CM

## 2023-10-17 DIAGNOSIS — G89.29 CHRONIC BILATERAL LOW BACK PAIN WITHOUT SCIATICA: ICD-10-CM

## 2023-10-17 DIAGNOSIS — R26.89 FUNCTIONAL GAIT ABNORMALITY: Primary | ICD-10-CM

## 2023-10-17 PROCEDURE — 99214 OFFICE O/P EST MOD 30 MIN: CPT | Performed by: PHYSICAL MEDICINE & REHABILITATION

## 2023-10-23 ENCOUNTER — OFFICE VISIT (OUTPATIENT)
Dept: INTERNAL MEDICINE CLINIC | Facility: CLINIC | Age: 88
End: 2023-10-23

## 2023-10-23 VITALS
RESPIRATION RATE: 16 BRPM | HEART RATE: 88 BPM | TEMPERATURE: 99 F | BODY MASS INDEX: 36.15 KG/M2 | DIASTOLIC BLOOD PRESSURE: 74 MMHG | HEIGHT: 65 IN | WEIGHT: 217 LBS | SYSTOLIC BLOOD PRESSURE: 135 MMHG

## 2023-10-23 DIAGNOSIS — R29.6 FALLING: ICD-10-CM

## 2023-10-23 DIAGNOSIS — E03.4 HYPOTHYROIDISM DUE TO ACQUIRED ATROPHY OF THYROID: ICD-10-CM

## 2023-10-23 DIAGNOSIS — Z71.85 VACCINE COUNSELING: ICD-10-CM

## 2023-10-23 DIAGNOSIS — E78.2 MIXED HYPERLIPIDEMIA: ICD-10-CM

## 2023-10-23 DIAGNOSIS — I10 ESSENTIAL HYPERTENSION, BENIGN: Primary | ICD-10-CM

## 2023-10-23 PROCEDURE — 3008F BODY MASS INDEX DOCD: CPT | Performed by: INTERNAL MEDICINE

## 2023-10-23 PROCEDURE — 3075F SYST BP GE 130 - 139MM HG: CPT | Performed by: INTERNAL MEDICINE

## 2023-10-23 PROCEDURE — 1159F MED LIST DOCD IN RCRD: CPT | Performed by: INTERNAL MEDICINE

## 2023-10-23 PROCEDURE — 1160F RVW MEDS BY RX/DR IN RCRD: CPT | Performed by: INTERNAL MEDICINE

## 2023-10-23 PROCEDURE — 81003 URINALYSIS AUTO W/O SCOPE: CPT | Performed by: INTERNAL MEDICINE

## 2023-10-23 PROCEDURE — 99214 OFFICE O/P EST MOD 30 MIN: CPT | Performed by: INTERNAL MEDICINE

## 2023-10-23 PROCEDURE — 3078F DIAST BP <80 MM HG: CPT | Performed by: INTERNAL MEDICINE

## 2023-10-23 RX ORDER — GABAPENTIN 100 MG/1
300 CAPSULE ORAL NIGHTLY
Qty: 90 CAPSULE | Refills: 1 | Status: SHIPPED | OUTPATIENT
Start: 2023-10-23

## 2023-10-23 NOTE — PATIENT INSTRUCTIONS
ASSESSMENT/PLAN:   Essential hypertension, benign  (primary encounter diagnosis) Stable. Careful with diet and excercise at least 30 minutes 3-4 times a week. Check blood pressures at different times on different days. Can purchase own blood pressure monitor. If not, check at local pharmacy. Bake foods more and grill occasionally. Avoid fried foods. No salt. Use other seasonings. 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 Vaccine counseling Recc. Covid booster. Can  do at local pharmacy. Mixed hyperlipidemia Check blood. [place] HOME SAFETY CHECKLIST   Provided by the [employer]    Individuals living with Alzheimer's and other dementias are at increased risk for injury or harm in certain areas of the home. As the disease progresses, they may become unaware of the dangers that exist. Consider taking the following precautions to create a safe environment, which may prevent dangerous situations from occurring and help maximize the person's independence for as long as possible. General Home Safety Tips      Store potentially hazardous items, such as medication, alcohol, matches, sharp objects or small appliances and tools, in a securely locked cabinet. Keep all cleaning products, such as liquid laundry pacs and bleach, out of sight or secured to avoid possible ingestion of harmful chemicals. Keep the number for the local poison control center handy or saved in your phone in case of emergency. Make sure carbon monoxide and smoke detectors and fire extinguishers are available and inspected regularly. Replace batteries twice a year during daylight saving time. Remove tripping hazards such as throw rugs, extension cords and excessive clutter. Keep walkways and rooms well lit. Secure large furniture, such as book shelves, cabinets or large TVs, to prevent tipping. Ensure chairs have armrests to provide support when going from a sitting to standing position. Apply stickers to glass doors at eye-level to ensure doors are visible. Install a latch or deadbolt either above or below eye-level on all doors. Remove locks on interior doors to prevent the person living with dementia from locking themselves in. Consider removing firearms from the home or storing them in a locked cabinet. Consider enrolling in a wandering response service. Contact the [employer]  Helpline (13 148 204) for more information. Kitchen      Use appliances that have an automatic shut-off feature. Prevent unsafe stove usage by applying stove knob covers, removing knobs or turning off the gas when the stove is not in use. Disconnect the garbage disposal.   [employer] with purchase date; regularly check for and throw away  items. Discard toxic plants and decorative fruits that may be mistaken for real food. Remove vitamins, prescription drugs, sugar substitutes and seasonings from the kitchen table and counters. Laundry Room     Clean out lint screens and dryer ducts regularly to prevent fires. Consider installing safety locks on washing machines and dryers to prevent inappropriate items being put in or taken out too early. Install locks on laundry chutes to avoid temptation to climb into or drop inappropriate items down the chute. Keep all cleaning products, such as liquid laundry pacs and bleach, out of sight or secured to avoid possible ingestion of harmful chemicals. Bathroom     Install grab bars for the shower, tub and toilet to provide additional support. Set the water temperature at 120 degrees Fahrenheit or below to prevent scalding. Apply textured stickers to slippery surfaces to prevent falls. Bedroom      Closely monitor the use of an electric blanket, heater or heating pad to prevent burns or other injuries. Provide seating near the bed to help with dressing.    Ensure closet shelves are at an accessible height so that items are easy to reach, which may prevent the person from climbing shelves or objects falling from overhead. Garage and Basement     Limit access to large equipment such as lawn mowers, weed trimmers or snow blowers. Keep poisonous chemicals, such as gasoline or paint thinner, out of reach. Lock and properly store ladders when not in use to prevent a tripping or climbing hazard. Remove access to car keys if the individual living with dementia is no longer driving. Install a motion sensor on the garage door. Jef stairs with bright tape and ensure railings are sturdy and secure to prevent tripping or falls. Back pain. Add gabapentin 100 mg at night. Can increase to 200 mg in 2 week to help with pain and after, another 2 weeks, can increase to 300 mg. Discussed with patient of side effects and use of these medications. Orders Placed This Encounter      Lipid Panel      TSH W Reflex To Free T4      URINALYSIS, AUTO, W/O SCOPE      Meds This Visit:  Requested Prescriptions     Signed Prescriptions Disp Refills    gabapentin 100 MG Oral Cap 90 capsule 1     Sig: Take 3 capsules (300 mg total) by mouth nightly.        Imaging & Referrals:  None        RTC 4-6-24 for physical.

## 2023-11-11 DIAGNOSIS — M54.50 CHRONIC BILATERAL LOW BACK PAIN WITHOUT SCIATICA: ICD-10-CM

## 2023-11-11 DIAGNOSIS — G89.29 CHRONIC BILATERAL LOW BACK PAIN WITHOUT SCIATICA: ICD-10-CM

## 2023-11-13 ENCOUNTER — OFFICE VISIT (OUTPATIENT)
Dept: PHYSICAL THERAPY | Age: 88
End: 2023-11-13
Attending: PHYSICAL MEDICINE & REHABILITATION
Payer: MEDICARE

## 2023-11-13 DIAGNOSIS — G89.29 CHRONIC BILATERAL LOW BACK PAIN WITHOUT SCIATICA: ICD-10-CM

## 2023-11-13 DIAGNOSIS — M54.50 CHRONIC BILATERAL LOW BACK PAIN WITHOUT SCIATICA: ICD-10-CM

## 2023-11-13 DIAGNOSIS — R26.89 FUNCTIONAL GAIT ABNORMALITY: Primary | ICD-10-CM

## 2023-11-13 PROCEDURE — 97162 PT EVAL MOD COMPLEX 30 MIN: CPT

## 2023-11-13 PROCEDURE — 97530 THERAPEUTIC ACTIVITIES: CPT

## 2023-11-13 PROCEDURE — 97110 THERAPEUTIC EXERCISES: CPT

## 2023-11-13 RX ORDER — TRAMADOL HYDROCHLORIDE 50 MG/1
50 TABLET ORAL NIGHTLY PRN
Qty: 30 TABLET | Refills: 0 | Status: SHIPPED | OUTPATIENT
Start: 2023-11-13 | End: 2023-12-12

## 2023-11-13 NOTE — TELEPHONE ENCOUNTER
Refill Request    Medication request: traMADol 50 MG Oral Tab   Take 1 tablet (50 mg total) by mouth nightly as needed for Pain.    LOV:10/17/2023 Terrence Mcgregor DO   Due back to clinic per last office note:  3 months  NOV: Visit date not found      [place]: 10/13/23 #30    Urine drug screen (if applicable): none  Pain contract: none    LOV plan (if weaning or changing medications): Continue tramadol 50 mg nightly.

## 2023-11-21 ENCOUNTER — APPOINTMENT (OUTPATIENT)
Dept: PHYSICAL THERAPY | Age: 88
End: 2023-11-21
Attending: PHYSICAL MEDICINE & REHABILITATION
Payer: MEDICARE

## 2023-11-28 ENCOUNTER — APPOINTMENT (OUTPATIENT)
Dept: PHYSICAL THERAPY | Age: 88
End: 2023-11-28
Attending: PHYSICAL MEDICINE & REHABILITATION
Payer: MEDICARE

## 2023-11-28 ENCOUNTER — TELEPHONE (OUTPATIENT)
Dept: PHYSICAL THERAPY | Age: 88
End: 2023-11-28

## 2023-12-06 ENCOUNTER — OFFICE VISIT (OUTPATIENT)
Dept: PHYSICAL THERAPY | Age: 88
End: 2023-12-06
Attending: PHYSICAL MEDICINE & REHABILITATION
Payer: MEDICARE

## 2023-12-06 PROCEDURE — 97110 THERAPEUTIC EXERCISES: CPT

## 2023-12-06 NOTE — PROGRESS NOTES
Diagnosis:   Low back pain  Gait dysfunction    Referring Provider: Matthew Gupta  Date of Evaluation:    11/13/2023    Precautions:  Fall risk Next MD visit:   none scheduled  Date of Surgery: n/a     Insurance Primary/Secondary: Joanna Johnson / N/A     # Auth Visits: 12            Subjective: Pt requires daughter to speak for him secondary to alzheimer's disease. Pain: 0/10      Objective:   - fair quad strength  - deconditioned       Assessment: Discussed with daughter regarding goals and she wants to improve his conditioning and function as she is having difficulty caring for him. His pain is relatively well managed. Goals: (to be met in 12 visits)   1) Pt competent with HEP. 2) Pt will be able to perform 10 reps of sit <> stand for 30 sec sit <> stand test  3) Pt demonstrate normalized lumbopelvic motor control with good TA and glute strength and activation. 4)Pt demonstrate 5/5 LE strength testing for gait and function. 5) Pt return to ADL function w/o further back limitations above 1/10. 6) Current STEVIE 58% Goal 20%       Plan: continue with functional strengthening  Date: 12/6/2023  TX#: 2/12 Date:                 TX#: 3/ Date:                 TX#: 4/ Date:                 TX#: 5/ Date:    Tx#: 6/   TE  - Stationary bike trial, unable to get on  - Recumbent bike trial, unable to put legs on  - UE supported squat, 3x10  - Ambulation, 50' x 5                            HEP: UE supported squat, 3x10, LTR    Charges: TE 3       Total Timed Treatment: 40 min  Total Treatment Time: 40 min

## 2023-12-11 DIAGNOSIS — M54.50 CHRONIC BILATERAL LOW BACK PAIN WITHOUT SCIATICA: ICD-10-CM

## 2023-12-11 DIAGNOSIS — G89.29 CHRONIC BILATERAL LOW BACK PAIN WITHOUT SCIATICA: ICD-10-CM

## 2023-12-12 ENCOUNTER — OFFICE VISIT (OUTPATIENT)
Dept: PHYSICAL THERAPY | Age: 88
End: 2023-12-12
Attending: PHYSICAL MEDICINE & REHABILITATION
Payer: MEDICARE

## 2023-12-12 PROCEDURE — 97110 THERAPEUTIC EXERCISES: CPT

## 2023-12-12 RX ORDER — TRAMADOL HYDROCHLORIDE 50 MG/1
50 TABLET ORAL NIGHTLY PRN
Qty: 30 TABLET | Refills: 0 | Status: SHIPPED | OUTPATIENT
Start: 2023-12-12

## 2023-12-12 NOTE — PROGRESS NOTES
Diagnosis:   Low back pain  Gait dysfunction    Referring Provider: Milan Bowling  Date of Evaluation:    11/13/2023    Precautions:  Fall risk Next MD visit:   none scheduled  Date of Surgery: n/a     Insurance Primary/Secondary: Supa Maciel / N/A     # Auth Visits: 12            Subjective: Pt requires daughter to speak for him secondary to alzheimer's disease. Pt states that he feels tired. Pain: 0/10      Objective:   - fair quad strength  - deconditioned       Assessment:  Pt tolerated therex well for general conditioning. Required moderate cues for technique and exercise, but participated well. Pt reported fatigue post treatment, but no increase in back pain      Goals: (to be met in 12 visits)   1) Pt competent with HEP. 2) Pt will be able to perform 10 reps of sit <> stand for 30 sec sit <> stand test  3) Pt demonstrate normalized lumbopelvic motor control with good TA and glute strength and activation. 4)Pt demonstrate 5/5 LE strength testing for gait and function. 5) Pt return to ADL function w/o further back limitations above 1/10. 6) Current STEVIE 58% Goal 20%       Plan: continue with functional strengthening  Date: 12/6/2023  TX#: 2/12 Date: 12/12/23              TX#: 3/12 Date:                 TX#: 4/ Date:                 TX#: 5/ Date:    Tx#: 6/   TE  - Stationary bike trial, unable to get on  - Recumbent bike trial, unable to put legs on  - UE supported squat, 3x10  - Ambulation, 50' x 5 TE  - UBE, 8'  - UE supported sit <> stand, 2x20  - hip adduction ball squeeze, 5s hold x 10  - Standing hip extension, 2x15 each  - UE ball squeeze, 5s hold x 10  - Shoulder overhead press, #5 DB, 2x8                           HEP: UE supported squat, 3x10, LTR    Charges: TE 3       Total Timed Treatment: 40 min  Total Treatment Time: 40 min

## 2023-12-12 NOTE — TELEPHONE ENCOUNTER
Refill Request    Medication request: traMADol 50 MG Oral Tab Take 1 tablet (50 mg total) by mouth nightly as needed for Pain. LOV:10/17/2023 Christine Moscoso,    Due back to clinic per last office note:  \"Follow up at/near completion of physical therapy. \"  NOV: Visit date not found      [place]: 11/13/2023 #50    Urine drug screen (if applicable): None  Pain contract: None    LOV plan (if weaning or changing medications): Per Dr. Jenise Dougherty note: \" Continue tramadol 50 mg nightly.  \"

## 2023-12-19 ENCOUNTER — OFFICE VISIT (OUTPATIENT)
Dept: PHYSICAL THERAPY | Age: 88
End: 2023-12-19
Attending: PHYSICAL MEDICINE & REHABILITATION
Payer: MEDICARE

## 2023-12-19 PROCEDURE — 97110 THERAPEUTIC EXERCISES: CPT

## 2023-12-19 RX ORDER — MEMANTINE HYDROCHLORIDE 28 MG/1
28 CAPSULE, EXTENDED RELEASE ORAL DAILY
Refills: 0 | OUTPATIENT
Start: 2023-12-19

## 2023-12-19 NOTE — PROGRESS NOTES
Diagnosis:   Low back pain  Gait dysfunction    Referring Provider: Maximiliano Downs  Date of Evaluation:    11/13/2023    Precautions:  Fall risk Next MD visit:   none scheduled  Date of Surgery: n/a     Insurance Primary/Secondary: Larisa Close / N/A     # Auth Visits: 12            Subjective: Per pt daughter, he seems more sturdy with less LOB at home. Have not started doing more exercises at home. Pain: 0/10      Objective:   - fair quad strength  - deconditioned       Assessment:  Pt tolerated therex well for general conditioning. Required moderate cues for technique and exercise, but participated well. Pt reported fatigue post treatment, but no increase in back pain      Goals: (to be met in 12 visits)   1) Pt competent with HEP. 2) Pt will be able to perform 10 reps of sit <> stand for 30 sec sit <> stand test  3) Pt demonstrate normalized lumbopelvic motor control with good TA and glute strength and activation. 4)Pt demonstrate 5/5 LE strength testing for gait and function. 5) Pt return to ADL function w/o further back limitations above 1/10. 6) Current STEVIE 58% Goal 20%       Plan: continue with functional strengthening  Date: 12/6/2023  TX#: 2/12 Date: 12/12/23              TX#: 3/12 Date: 12/19/23                TX#: 4/12 Date:                 TX#: 5/ Date:    Tx#: 6/   TE  - Stationary bike trial, unable to get on  - Recumbent bike trial, unable to put legs on  - UE supported squat, 3x10  - Ambulation, 50' x 5 TE  - UBE, 8'  - UE supported sit <> stand, 2x20  - hip adduction ball squeeze, 5s hold x 10  - Standing hip extension, 2x15 each  - UE ball squeeze, 5s hold x 10  - Shoulder overhead press, #5 DB, 2x8 TE  - UBE, 8'  - Cross arm sit <> stand with foam pad on chair to elevate surface, 3x10 with SBA  - hip adduction ball squeeze, 5s hold x 10  - Seated LAQ, 2x20  - Standing hip extension, 3x15 each                          HEP: UE supported squat, 3x10, LTR    Charges: TE 3       Total Timed Treatment: 40 min  Total Treatment Time: 40 min

## 2023-12-26 ENCOUNTER — APPOINTMENT (OUTPATIENT)
Dept: PHYSICAL THERAPY | Age: 88
End: 2023-12-26
Attending: PHYSICAL MEDICINE & REHABILITATION
Payer: MEDICARE

## 2024-01-02 ENCOUNTER — OFFICE VISIT (OUTPATIENT)
Dept: PHYSICAL THERAPY | Age: 89
End: 2024-01-02
Attending: PHYSICAL MEDICINE & REHABILITATION
Payer: MEDICARE

## 2024-01-02 PROCEDURE — 97110 THERAPEUTIC EXERCISES: CPT

## 2024-01-02 NOTE — PROGRESS NOTES
Diagnosis:   Low back pain  Gait dysfunction    Referring Provider: Meche  Date of Evaluation:    11/13/2023    Precautions:  Fall risk Next MD visit:   none scheduled  Date of Surgery: n/a     Insurance Primary/Secondary: [employer] / N/A     # Auth Visits: 12           Discharge Summary  Pt has attended 5 visits in Physical Therapy.     Subjective: Per pt daughter, he seems more sturdy with less LOB at home and no falls.  Have not started doing more exercises at home as daughter states that it's been difficult to implement. His dementia makes it difficult for him to IND adhere to an exercise program    Assessment:  Pt tolerated therex well for general conditioning. He demonstrates improved quad strength evident from being able to perform sit <> stand transfer x10 with no UE support. Required cueing to descend under control. Discussed home exercises with daughter and she states that compliance has been a challenge because she needs to help implement the exercises due to his dementia, but she has had her own challenges. She states that she will be better about doing exercises with the patient at home. Discussed possibly working up towards 30 minutes of activity a day and she verbalized agreement      Pain: 0/10      Objective:   - fair quad strength  - deconditioned       Goals: (to be met in 12 visits)   1) Pt competent with HEP - progressing  2) Pt will be able to perform 10 reps of sit <> stand for 30 sec sit <> stand test - progressing  3) Pt demonstrate normalized lumbopelvic motor control with good TA and glute strength and activation.- progressing  4)Pt demonstrate 5/5 LE strength testing for gait and function.- progressing  5) Pt return to ADL function w/o further back limitations above 1/10.- met  6) Current STEVIE 58% Goal 20%       Post Oswestry Disability Index Score  Post Score: 10 % (1/2/2024 12:21 PM)    48 % improvement    Plan: Discharge from PT    Patient/Family/Caregiver was advised  of these findings, precautions, and treatment options and has agreed to actively participate in planning and for this course of care.    Thank you for your referral. If you have any questions, please contact me at Dept: [phone].    Sincerely,  Electronically signed by therapist: John Becker PT     Physician's certification required:  No  Please co-sign or sign and return this letter via fax as soon as possible to [phone].   I certify the need for these services furnished under this plan of treatment and while under my care.    X___________________________________________________ Date____________________    Certification From: 1/2/2024  To:4/1/2024    Date: 12/6/2023  TX#: 2/12 Date: 12/12/23              TX#: 3/12 Date: 12/19/23                TX#: 4/12 Date:                 TX#: 5/ Date:   Tx#: 6/   TE  - Stationary bike trial, unable to get on  - Recumbent bike trial, unable to put legs on  - UE supported squat, 3x10  - Ambulation, 50' x 5 TE  - UBE, 8'  - UE supported sit <> stand, 2x20  - hip adduction ball squeeze, 5s hold x 10  - Standing hip extension, 2x15 each  - UE ball squeeze, 5s hold x 10  - Shoulder overhead press, #5 DB, 2x8 TE  - UBE, 8'  - Cross arm sit <> stand, 3x10 with SBA  - Band pull apart, red TB, 3x20  - Seated LAQ, 2x20  - Side stepping, with no UE support, 3x5 steps each side                          HEP: UE supported squat, 3x10, LTR    Charges: TE 3       Total Timed Treatment: 40 min  Total Treatment Time: 40 min

## 2024-01-08 DIAGNOSIS — G89.29 CHRONIC BILATERAL LOW BACK PAIN WITHOUT SCIATICA: ICD-10-CM

## 2024-01-08 DIAGNOSIS — M54.50 CHRONIC BILATERAL LOW BACK PAIN WITHOUT SCIATICA: ICD-10-CM

## 2024-01-08 RX ORDER — TRAMADOL HYDROCHLORIDE 50 MG/1
50 TABLET ORAL NIGHTLY PRN
Qty: 30 TABLET | Refills: 0 | Status: SHIPPED | OUTPATIENT
Start: 2024-01-11

## 2024-01-08 NOTE — TELEPHONE ENCOUNTER
Refill Request    Medication request: traMADol 50 MG Oral Tab. Take 1 tablet (50 mg total) by mouth nightly as needed for Pain.      LOV:10/17/2023 Terrence Mcgregor DO   Due back to clinic per last office note:  Follow up at/near completion of physical therapy.    NOV: Visit date not found      [place]: 12/12/2023 #30    Urine drug screen (if applicable): none  Pain contract: none    LOV plan (if weaning or changing medications):  Continue tramadol 50 mg nightly

## 2024-02-07 DIAGNOSIS — M54.50 CHRONIC BILATERAL LOW BACK PAIN WITHOUT SCIATICA: ICD-10-CM

## 2024-02-07 DIAGNOSIS — G89.29 CHRONIC BILATERAL LOW BACK PAIN WITHOUT SCIATICA: ICD-10-CM

## 2024-02-07 RX ORDER — TRAMADOL HYDROCHLORIDE 50 MG/1
50 TABLET ORAL NIGHTLY PRN
Qty: 30 TABLET | Refills: 0 | Status: SHIPPED | OUTPATIENT
Start: 2024-02-07

## 2024-02-07 NOTE — TELEPHONE ENCOUNTER
Refill Request    Medication request: traMADol 50 MG Oral Tab. Take 1 tablet (50 mg total) by mouth nightly as needed for Pain.      LOV:10/17/2023 Terrence Mcgregor DO   Due back to clinic per last office note:  Follow up at/near completion of physical therapy.    NOV: 2/22/2024 Terrence Mcgregor DO      [place]: 1/8/2024 #30 (30 days)    Urine drug screen (if applicable): none  Pain contract: none    LOV plan (if weaning or changing medications): Continue tramadol 50 mg nightly.

## 2024-02-22 ENCOUNTER — OFFICE VISIT (OUTPATIENT)
Dept: PHYSICAL MEDICINE AND REHAB | Facility: CLINIC | Age: 89
End: 2024-02-22
Payer: COMMERCIAL

## 2024-02-22 VITALS — BODY MASS INDEX: 36 KG/M2 | WEIGHT: 215 LBS | HEART RATE: 94 BPM | OXYGEN SATURATION: 97 %

## 2024-02-22 DIAGNOSIS — R26.89 FUNCTIONAL GAIT ABNORMALITY: ICD-10-CM

## 2024-02-22 DIAGNOSIS — G89.29 CHRONIC BILATERAL LOW BACK PAIN WITHOUT SCIATICA: Primary | ICD-10-CM

## 2024-02-22 DIAGNOSIS — M54.50 CHRONIC BILATERAL LOW BACK PAIN WITHOUT SCIATICA: Primary | ICD-10-CM

## 2024-02-22 PROCEDURE — 1126F AMNT PAIN NOTED NONE PRSNT: CPT | Performed by: PHYSICAL MEDICINE & REHABILITATION

## 2024-02-22 PROCEDURE — 99213 OFFICE O/P EST LOW 20 MIN: CPT | Performed by: PHYSICAL MEDICINE & REHABILITATION

## 2024-02-22 PROCEDURE — 1159F MED LIST DOCD IN RCRD: CPT | Performed by: PHYSICAL MEDICINE & REHABILITATION

## 2024-02-22 PROCEDURE — 1160F RVW MEDS BY RX/DR IN RCRD: CPT | Performed by: PHYSICAL MEDICINE & REHABILITATION

## 2024-02-22 NOTE — PATIENT INSTRUCTIONS
Stop the tramadol, have him take gabapentin instead and see how does with it.     Anything that can be done to get him on the bike or walking will be helpful.     If the gabapentin does not work we can transition back to the tramadol.     If the gabapentin does not work and he needs refill of the tramadol you may request it without needing to see me unless there are new symptoms.

## 2024-03-10 DIAGNOSIS — M54.50 CHRONIC BILATERAL LOW BACK PAIN WITHOUT SCIATICA: ICD-10-CM

## 2024-03-10 DIAGNOSIS — G89.29 CHRONIC BILATERAL LOW BACK PAIN WITHOUT SCIATICA: ICD-10-CM

## 2024-03-11 NOTE — TELEPHONE ENCOUNTER
Refill Request    Medication request: traMADol 50 MG Oral Tab.  Take 1 tablet (50 mg total) by mouth nightly as needed for Pain.      LOV:2/22/2024 Terrence Mcgregor,    Due back to clinic per last office note:  Return in 4 months  NOV: Visit date not found      [place]: 2/7/2024 #30 (30 days)    Urine drug screen (if applicable): none  Pain contract: None    LOV plan (if weaning or changing medications): Stop the tramadol, have him take gabapentin instead and see how does with it. If the gabapentin does not work we can transition back to the tramadol. If the gabapentin does not work and he needs refill of the tramadol you may request it without needing to see me unless there are new symptoms.       [facility] services used ( Yu ID#[id number] attempt (need to ask if he is still taking Gabapentin)

## 2024-03-12 RX ORDER — TRAMADOL HYDROCHLORIDE 50 MG/1
50 TABLET ORAL NIGHTLY PRN
Qty: 30 TABLET | Refills: 0 | Status: SHIPPED | OUTPATIENT
Start: 2024-03-12

## 2024-03-12 NOTE — TELEPHONE ENCOUNTER
Received call back from Pt's daughter  stating that patient tried gabapentin, however, that was not working for him, as he was waking up at night with severe pain. Pt stopped gabapentin and wants to continue the tramadol.

## 2024-03-29 ENCOUNTER — LAB ENCOUNTER (OUTPATIENT)
Dept: LAB | Age: 89
End: 2024-03-29
Attending: INTERNAL MEDICINE
Payer: MEDICARE

## 2024-03-29 DIAGNOSIS — E78.2 MIXED HYPERLIPIDEMIA: ICD-10-CM

## 2024-03-29 DIAGNOSIS — E03.4 HYPOTHYROIDISM DUE TO ACQUIRED ATROPHY OF THYROID: ICD-10-CM

## 2024-03-29 LAB
CHOLEST SERPL-MCNC: 117 MG/DL (ref ?–200)
FASTING PATIENT LIPID ANSWER: YES
HDLC SERPL-MCNC: 40 MG/DL (ref 40–59)
LDLC SERPL CALC-MCNC: 52 MG/DL (ref ?–100)
NONHDLC SERPL-MCNC: 77 MG/DL (ref ?–130)
T4 FREE SERPL-MCNC: 1 NG/DL (ref 0.8–1.7)
TRIGL SERPL-MCNC: 146 MG/DL (ref 30–149)
TSI SER-ACNC: 4.84 MIU/ML (ref 0.55–4.78)
VLDLC SERPL CALC-MCNC: 21 MG/DL (ref 0–30)

## 2024-03-29 PROCEDURE — 84439 ASSAY OF FREE THYROXINE: CPT

## 2024-03-29 PROCEDURE — 84443 ASSAY THYROID STIM HORMONE: CPT

## 2024-03-29 PROCEDURE — 80061 LIPID PANEL: CPT

## 2024-03-29 PROCEDURE — 36415 COLL VENOUS BLD VENIPUNCTURE: CPT

## 2024-04-01 NOTE — PROGRESS NOTES
Lipid (choilesterol) is good,   Thyroid is aBnormal. Was this blood done on B complex ??  Recheck in 1- 3 months.

## 2024-04-02 ENCOUNTER — PATIENT MESSAGE (OUTPATIENT)
Dept: INTERNAL MEDICINE CLINIC | Facility: CLINIC | Age: 89
End: 2024-04-02

## 2024-04-03 NOTE — TELEPHONE ENCOUNTER
From: Audie Houston  To: Dacia Oseguera  Sent: 4/2/2024 9:13 AM CDT  Subject: vitamins    April RN  the only vitamins my father is taking are: D3 2000 IU, 2 pills per day,  FISH OIL 1000 mg , 1 pill per day, B12 1000 mcg, 1 pill per day  Nereida Saez  [phone]

## 2024-04-04 DIAGNOSIS — F03.90 DEMENTIA (HCC): ICD-10-CM

## 2024-04-04 DIAGNOSIS — E03.9 HYPOTHYROIDISM: ICD-10-CM

## 2024-04-04 DIAGNOSIS — R41.3 MEMORY LOSS: ICD-10-CM

## 2024-04-04 NOTE — TELEPHONE ENCOUNTER
Reply to result note       Dacia Oseguera MD  4/1/2024 12:47 PM CDT       Lipid (choilesterol) is good,  Thyroid is aBnormal. Was this blood done on B complex ??  Recheck in 1- 3 months.

## 2024-04-05 RX ORDER — FINASTERIDE 5 MG/1
5 TABLET, FILM COATED ORAL DAILY
Qty: 90 TABLET | Refills: 3 | Status: SHIPPED | OUTPATIENT
Start: 2024-04-05

## 2024-04-05 RX ORDER — MONTELUKAST SODIUM 10 MG/1
10 TABLET ORAL NIGHTLY
Qty: 90 TABLET | Refills: 3 | Status: SHIPPED | OUTPATIENT
Start: 2024-04-05

## 2024-04-05 NOTE — TELEPHONE ENCOUNTER
Please review; protocol failed/No Protocol    No Asthma/COPD on problem list    Requested Prescriptions   Pending Prescriptions Disp Refills    montelukast 10 MG Oral Tab [Pharmacy Med Name: MONTELUKAST SOD 10 MG TABLET] 90 tablet 3     Sig: Take 1 tablet (10 mg total) by mouth nightly.       Asthma & COPD Medication Protocol Failed - 4/4/2024 12:13 AM        Failed - Asthma Action Score greater than or equal to 20        Failed - AAP/ACT given in last 12 months     No data recorded  No data recorded  No data recorded  No data recorded          Passed - Appointment in past 6 or next 3 months      Recent Outpatient Visits              1 month ago Chronic bilateral low back pain without sciatica    [facility] Terrence Mcgregor DO    Office Visit    3 months ago     [facility] John Becker, PT    Office Visit    3 months ago     [facility] John Becker, PT    Office Visit    3 months ago     [facility] John Becker, PT    Office Visit    4 months ago     [facility] John Becker, PT    Office Visit          Future Appointments         Provider Department Appt Notes    In 1 week Katalina Rojas MD [facility] wax problem with ears    In 3 weeks Dacia Oseguera MD [facility] 4/6/2023 LAST PX                Signed Prescriptions Disp Refills    finasteride 5 MG Oral Tab 90 tablet 3     Sig: Take 1 tablet (5 mg total) by mouth daily.       Genitourinary Medications Passed - 4/4/2024 12:13 AM        Passed - Patient does not have pulmonary hypertension on problem list        Passed - In person appointment or virtual visit in the past 12 mos or appointment in next 3 mos     Recent Outpatient Visits              1 month ago Chronic  bilateral low back pain without sciatica    [facility] [place]    Office Visit    3 months ago     [facility] John Becker, PT    Office Visit    3 months ago     [facility] John Becker, PT    Office Visit    3 months ago     [facility] John Becker, PT    Office Visit    4 months ago     [facility] John Becker, PT    Office Visit          Future Appointments         Provider Department Appt Notes    In 1 week Katalina Rojas MD [facility], [place] wax problem with ears    In 3 weeks Dacia Oseguera MD [facility] 4/6/2023 LAST PX                  Future Appointments         Provider Department Appt Notes    In 1 week Katalina Rojas MD [facility], [place] wax problem with ears    In 3 weeks Dacia Oseguera MD [facility] 4/6/2023 LAST PX          Recent Outpatient Visits              1 month ago Chronic bilateral low back pain without sciatica    [facility] [place]    Office Visit    3 months ago     [facility] John Becker, PT    Office Visit    3 months ago     [facility] John Becker, PT    Office Visit    3 months ago     [facility] John Becker, PT    Office Visit    4 months ago     [facility] John Becker, PT    Office Visit

## 2024-04-08 ENCOUNTER — PATIENT MESSAGE (OUTPATIENT)
Dept: INTERNAL MEDICINE CLINIC | Facility: CLINIC | Age: 89
End: 2024-04-08

## 2024-04-09 ENCOUNTER — NURSE TRIAGE (OUTPATIENT)
Dept: INTERNAL MEDICINE CLINIC | Facility: CLINIC | Age: 89
End: 2024-04-09

## 2024-04-09 DIAGNOSIS — G89.29 CHRONIC BILATERAL LOW BACK PAIN WITHOUT SCIATICA: ICD-10-CM

## 2024-04-09 DIAGNOSIS — M54.50 CHRONIC BILATERAL LOW BACK PAIN WITHOUT SCIATICA: ICD-10-CM

## 2024-04-09 PROBLEM — N18.30 CKD (CHRONIC KIDNEY DISEASE) STAGE 3, GFR 30-59 ML/MIN (HCC): Chronic | Status: ACTIVE | Noted: 2024-04-09

## 2024-04-09 PROBLEM — E66.01 SEVERE OBESITY (BMI 35.0-39.9) WITH COMORBIDITY (HCC): Chronic | Status: ACTIVE | Noted: 2024-04-09

## 2024-04-09 RX ORDER — TRAMADOL HYDROCHLORIDE 50 MG/1
50 TABLET ORAL NIGHTLY PRN
Qty: 30 TABLET | Refills: 2 | Status: SHIPPED | OUTPATIENT
Start: 2024-04-11

## 2024-04-09 NOTE — TELEPHONE ENCOUNTER
From: Audie Houston  To: Dacia Oseguera  Sent: 4/8/2024 1:11 PM CDT  Subject: medical equipment    Doctor: my father fell out of the bed this morning, this the second time since last November, could you advice me what I should do?  He has an appointment with you April 29  I think you need to give me orders for medical equipment  thank you  Nereida Saez

## 2024-04-09 NOTE — TELEPHONE ENCOUNTER
Action Requested: Summary for Provider     []  Critical Lab, Recommendations Needed  [] Need Additional Advice  []   FYI    []   Need Orders  [] Need Medications Sent to Pharmacy  []  Other     SUMMARY: Appointment scheduled today per protocol. Advised urgent care or immediate care  for worsening symptoms and emergency room  for shortness of breath ,chest pain, bleeding, severe weakness, worsening confusion.         Future Appointments   Date Time Provider Department Center   4/9/2024  1:00 PM Randy Pryor MD [id number]  [place] [address]     Reason for call: Fall  Onset: yesterday         Daughter (WILIAN) reported patient fell yesterday from the  bed to the floor, she thinks that patient tried to reach out for the urinal on the bedside table. Daughter lives with the patient ,heard patient coughing and found laying flat on the floor.   Denied any injury, patient is taking ASA 81 mg daily .   Hx dementia,he is also taking tramadol 50 mg and QUEtiapine Fumarate 25 MG Oral Tab  at night .     Per daughter, patient also fell  back in November from the bed,called paramedics at that time to assist in putting him back to bed. Had PT before but ended in January 2024. Daughter does not want the patient to live in a nursing home.           Reason for Disposition   MODERATE weakness (i.e., interferes with work, school, normal activities) and cause unknown  (Exceptions: Weakness with acute minor illness, or weakness from poor fluid intake.)    Protocols used: Weakness (Generalized) and Fatigue-A-OH

## 2024-04-09 NOTE — TELEPHONE ENCOUNTER
Refill Request    Medication request: traMADol 50 MG Oral Tab.   Take 1 tablet (50 mg total) by mouth nightly as needed for Pain.      LOV:2/22/2024 Terrence Mcgregor DO   Due back to clinic per last office note:  Follow up in 4-6 months or earlier PRN.   NOV: Visit date not found      [place]Last ref[place]: 3/12/2024 #30 (30 days)    Urine drug screen (if applicable): none  Pain contract: None    LOV plan (if weaning or changing medications):  If the gabapentin does not work we can transition back to the tramadol.

## 2024-04-18 ENCOUNTER — OFFICE VISIT (OUTPATIENT)
Dept: OTOLARYNGOLOGY | Facility: CLINIC | Age: 89
End: 2024-04-18
Payer: COMMERCIAL

## 2024-04-18 VITALS — WEIGHT: 219 LBS | HEIGHT: 65 IN | BODY MASS INDEX: 36.49 KG/M2

## 2024-04-18 DIAGNOSIS — H91.93 DECREASED HEARING OF BOTH EARS: Primary | ICD-10-CM

## 2024-04-18 DIAGNOSIS — H61.23 CERUMEN DEBRIS ON TYMPANIC MEMBRANE OF BOTH EARS: ICD-10-CM

## 2024-04-18 PROCEDURE — 1160F RVW MEDS BY RX/DR IN RCRD: CPT | Performed by: SPECIALIST

## 2024-04-18 PROCEDURE — 99213 OFFICE O/P EST LOW 20 MIN: CPT | Performed by: SPECIALIST

## 2024-04-18 PROCEDURE — 1159F MED LIST DOCD IN RCRD: CPT | Performed by: SPECIALIST

## 2024-04-18 PROCEDURE — 3008F BODY MASS INDEX DOCD: CPT | Performed by: SPECIALIST

## 2024-04-18 NOTE — PROGRESS NOTES
Audie Houston is a 89 year old male.   Chief Complaint   Patient presents with    Ear Wax     Ear cleaning     HPI:     Patient with worsening in his difficulty of hearing.  Wears binaural hearing aids.  Current Outpatient Medications   Medication Sig Dispense Refill    traMADol 50 MG Oral Tab Take 1 tablet (50 mg total) by mouth nightly as needed for Pain. 30 tablet 2    montelukast 10 MG Oral Tab Take 1 tablet (10 mg total) by mouth nightly. 90 tablet 3    finasteride 5 MG Oral Tab Take 1 tablet (5 mg total) by mouth daily. 90 tablet 3    gabapentin 100 MG Oral Cap Take 3 capsules (300 mg total) by mouth nightly. 90 capsule 1    fluticasone propionate 50 MCG/ACT Nasal Suspension 2 sprays by Each Nare route daily. 1 each 3    tamsulosin 0.4 MG Oral Cap Take 1 capsule (0.4 mg total) by mouth daily. 90 capsule 3    losartan 25 MG Oral Tab Take 1 tablet (25 mg total) by mouth daily. 90 tablet 3    simvastatin 20 MG Oral Tab Take 1 tablet (20 mg total) by mouth nightly. 90 tablet 3    omeprazole 20 MG Oral Capsule Delayed Release Take 1 capsule (20 mg total) by mouth daily. 90 capsule 3    Memantine HCl ER 28 MG Oral Capsule SR 24 Hr Take 1 capsule (28 mg total) by mouth daily.      QUEtiapine Fumarate 25 MG Oral Tab TAKE 1 TABLET BY MOUTH AT BEDTIME CAN GIVE ADDITIONAL TABLET AS NEEDED FOR AGITATION 180 tablet 0    aspirin 81 MG Oral Tab Take 1 tablet (81 mg total) by mouth daily.      acetaminophen (TYLENOL EXTRA STRENGTH) 500 MG Oral Tab Take 1 tablet (500 mg total) by mouth every 6 (six) hours as needed for Pain.      Vitamin D3 (VITAMIN D3) 2000 UNITS Oral Cap Take 1 capsule (2,000 Units total) by mouth daily.      Vitamin B-12 (VITAMIN B12) 1000 MCG Oral Tab Take 1 tablet (1,000 mcg total) by mouth daily.        Past Medical History:    Acid reflux    Allergic rhinitis    Anxiety    Arthritis    Cataract    OU    Dementia (HCC)    Depression    Dermatochalasis of both eyelids    OU    Elevated PSA    Negative  biopsies    Elevated PSA    and free PSA at 19%-Negative biopsies    Essential hypertension    Floaters    OD    Foot drop    w/ weakness    Obesity    Other and unspecified hyperlipidemia    Pinguecula of both eyes    OU    Stomach ulcer    per: NG-bleeding    Traumatic brain injury (HCC)      Social History:  Social History     Socioeconomic History    Marital status:    Tobacco Use    Smoking status: Former     Current packs/day: 0.00     Types: Cigarettes     Quit date: 1955     Years since quittin.3    Smokeless tobacco: Never   Vaping Use    Vaping status: Never Used   Substance and Sexual Activity    Alcohol use: No    Drug use: No   Other Topics Concern    Caffeine Concern Yes     Comment: decaf coffee,tea-1 cup/day    Exercise No    Reaction to local anesthetic No   Social History Narrative    The patient uses the following assistive device(s):  single-point cane.      The patient does live in a home with stairs.        REVIEW OF SYSTEMS:   GENERAL HEALTH: feels well otherwise  GENERAL : denies fever, chills, sweats, weight loss, weight gain  SKIN: denies any unusual skin lesions or rashes  RESPIRATORY: denies shortness of breath with exertion  NEURO: denies headaches    EXAM:   Ht 5' 5\" (1.651 m)   Wt 219 lb (99.3 kg)   BMI 36.44 kg/m²   System Details   Skin Inspection - Normal.   Constitutional Overall appearance - Normal.   Head/Face Facial features - Normal. Eyebrows - Normal. Skull - Normal.   Eyes Conjunctiva - Right: Normal, Left: Normal. Pupil - Right: Normal, Left: Normal.    Ears Inspection - Right: Normal, Left: Normal.   Canal -lateral nonocclusive cerumen fully cleaned  TM - Right: Normal, Left: Normal.   Nasal External nose - Normal.      Oral/Oropharynx Lips - Normal,   Neck Exam Inspection - Normal. Palpation - Normal. Parotid gland - Normal. Thyroid gland - Normal.   Lymph Detail Submental. Submandibular. Anterior cervical. Posterior cervical. Supraclavicular all without  enlargement   Psychiatric Orientation - Oriented to time, place, person & situation. Appropriate mood and affect.   Neurological Memory - Normal. Cranial nerves - Cranial nerves II through XII grossly intact.     ASSESSMENT AND PLAN:   1. Decreased hearing of both ears  Would recheck audiogram to make sure there has not been a worsening in hearing.  Also possible that there is a worsening of the dementia.  See audiologist first.  Hearing aids appear to be working normally.  I would also speak to the neurologist.    2. Cerumen debris on tympanic membrane of both ears  Fully cleaned.      The patient indicates understanding of these issues and agrees to the plan.      Katalina Rojas MD  4/18/2024  1:41 PM

## 2024-04-18 NOTE — PATIENT INSTRUCTIONS
There was some cerumen fully cleaned from your ears.  I would repeat an audiogram to see if there is been more hearing loss.  One of the other options, is that the dementia has worsened.  Causing a decrease in comprehension.  The hearing aids appear to be working normally.  I would see the audiologist first and retest the hearing.  If this is not helpful, I would see the neurologist.

## 2024-04-29 ENCOUNTER — HOSPITAL ENCOUNTER (OUTPATIENT)
Dept: GENERAL RADIOLOGY | Facility: HOSPITAL | Age: 89
Discharge: HOME | End: 2024-04-29
Attending: INTERNAL MEDICINE
Payer: MEDICARE

## 2024-04-29 ENCOUNTER — OFFICE VISIT (OUTPATIENT)
Dept: INTERNAL MEDICINE CLINIC | Facility: CLINIC | Age: 89
End: 2024-04-29

## 2024-04-29 VITALS
DIASTOLIC BLOOD PRESSURE: 64 MMHG | TEMPERATURE: 98 F | WEIGHT: 214.63 LBS | BODY MASS INDEX: 31.79 KG/M2 | HEIGHT: 69 IN | OXYGEN SATURATION: 100 % | SYSTOLIC BLOOD PRESSURE: 120 MMHG | HEART RATE: 69 BPM

## 2024-04-29 DIAGNOSIS — H25.13 AGE-RELATED NUCLEAR CATARACT OF BOTH EYES: Primary | ICD-10-CM

## 2024-04-29 DIAGNOSIS — Z71.85 VACCINE COUNSELING: ICD-10-CM

## 2024-04-29 DIAGNOSIS — R05.2 SUBACUTE COUGH: ICD-10-CM

## 2024-04-29 DIAGNOSIS — Z00.00 ENCOUNTER FOR ANNUAL HEALTH EXAMINATION: ICD-10-CM

## 2024-04-29 DIAGNOSIS — I10 ESSENTIAL HYPERTENSION, BENIGN: ICD-10-CM

## 2024-04-29 DIAGNOSIS — F01.50 VASCULAR DEMENTIA WITHOUT BEHAVIORAL DISTURBANCE, PSYCHOTIC DISTURBANCE, MOOD DISTURBANCE, OR ANXIETY, UNSPECIFIED DEMENTIA SEVERITY (HCC): ICD-10-CM

## 2024-04-29 DIAGNOSIS — N32.3 DIVERTICULUM OF BLADDER: ICD-10-CM

## 2024-04-29 DIAGNOSIS — E78.2 MIXED HYPERLIPIDEMIA: ICD-10-CM

## 2024-04-29 DIAGNOSIS — L29.9 SEVERE ITCHING: ICD-10-CM

## 2024-04-29 DIAGNOSIS — E03.4 HYPOTHYROIDISM DUE TO ACQUIRED ATROPHY OF THYROID: ICD-10-CM

## 2024-04-29 DIAGNOSIS — N18.31 STAGE 3A CHRONIC KIDNEY DISEASE (HCC): Chronic | ICD-10-CM

## 2024-04-29 DIAGNOSIS — E66.01 SEVERE OBESITY (BMI 35.0-39.9) WITH COMORBIDITY (HCC): Chronic | ICD-10-CM

## 2024-04-29 PROBLEM — R52 PAIN: Status: ACTIVE | Noted: 2024-04-29

## 2024-04-29 LAB
APPEARANCE: CLEAR
BILIRUBIN: NEGATIVE
GLUCOSE (URINE DIPSTICK): NEGATIVE MG/DL
KETONES (URINE DIPSTICK): NEGATIVE MG/DL
LEUKOCYTES: NEGATIVE
MULTISTIX LOT#: NORMAL NUMERIC
NITRITE, URINE: NEGATIVE
OCCULT BLOOD: NEGATIVE
PH, URINE: 7.5 (ref 4.5–8)
PROTEIN (URINE DIPSTICK): NEGATIVE MG/DL
SPECIFIC GRAVITY: 1.01 (ref 1–1.03)
URINE-COLOR: YELLOW
UROBILINOGEN,SEMI-QN: 1 MG/DL (ref 0–1.9)

## 2024-04-29 PROCEDURE — 90677 PCV20 VACCINE IM: CPT | Performed by: INTERNAL MEDICINE

## 2024-04-29 PROCEDURE — 1170F FXNL STATUS ASSESSED: CPT | Performed by: INTERNAL MEDICINE

## 2024-04-29 PROCEDURE — 3008F BODY MASS INDEX DOCD: CPT | Performed by: INTERNAL MEDICINE

## 2024-04-29 PROCEDURE — 1160F RVW MEDS BY RX/DR IN RCRD: CPT | Performed by: INTERNAL MEDICINE

## 2024-04-29 PROCEDURE — 71046 X-RAY EXAM CHEST 2 VIEWS: CPT | Performed by: INTERNAL MEDICINE

## 2024-04-29 PROCEDURE — 99214 OFFICE O/P EST MOD 30 MIN: CPT | Performed by: INTERNAL MEDICINE

## 2024-04-29 PROCEDURE — 3074F SYST BP LT 130 MM HG: CPT | Performed by: INTERNAL MEDICINE

## 2024-04-29 PROCEDURE — G0009 ADMIN PNEUMOCOCCAL VACCINE: HCPCS | Performed by: INTERNAL MEDICINE

## 2024-04-29 PROCEDURE — 1159F MED LIST DOCD IN RCRD: CPT | Performed by: INTERNAL MEDICINE

## 2024-04-29 PROCEDURE — 81003 URINALYSIS AUTO W/O SCOPE: CPT | Performed by: INTERNAL MEDICINE

## 2024-04-29 PROCEDURE — 96160 PT-FOCUSED HLTH RISK ASSMT: CPT | Performed by: INTERNAL MEDICINE

## 2024-04-29 PROCEDURE — G0439 PPPS, SUBSEQ VISIT: HCPCS | Performed by: INTERNAL MEDICINE

## 2024-04-29 PROCEDURE — 3078F DIAST BP <80 MM HG: CPT | Performed by: INTERNAL MEDICINE

## 2024-04-29 PROCEDURE — 1126F AMNT PAIN NOTED NONE PRSNT: CPT | Performed by: INTERNAL MEDICINE

## 2024-04-29 RX ORDER — LEVOCETIRIZINE DIHYDROCHLORIDE 5 MG/1
5 TABLET, FILM COATED ORAL EVERY EVENING
Qty: 30 TABLET | Refills: 0 | Status: SHIPPED | OUTPATIENT
Start: 2024-04-29

## 2024-04-29 NOTE — PATIENT INSTRUCTIONS
ASSESSMENT/PLAN:     Encounter Diagnoses   Name Primary?    Age-related nuclear cataract of both eyes Stable. Fu optho.    Yes    Stage 3a chronic kidney disease (HCC) No motrin, ibuprofen, advil, alleve, naprosyn  with these medications.  Check blood.        Vascular dementia without behavioral disturbance, psychotic disturbance, mood disturbance, or anxiety, unspecified Stable. dementia severity (HCC) Stable. FU neurology.        Essential hypertension, benign Stable. Careful with diet and excercise at least 30 minutes 3-4 times a week. Check blood pressures at different times on different days. Can purchase own blood pressure monitor. If not, check at local pharmacy. Bake foods more and grill occasionally. Avoid fried foods. No salt. Use other seasonings.         Diverticulum of bladder Stable.        Mixed hyperlipidemia Stable.        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        Severe obesity (BMI 35.0-39.9) with comorbidity (HCC)       Vaccine counseling PCV 20 today.        Encounter for annual health examination Check urine.        Subacute cough Check CXR, Try xyzal 5 mg at night. Discussed with patient of side effects and use of these medications.  Stop montelukast.  Stop the Xyzal if no better after 7 days of taking it with the cough.       Severe itching history of eczema.  Will try Xyzal.  If no better call.  Can try coconut oil or O'Jed's foot cream on whole body.        Orders Placed This Encounter   Procedures    Comp Metabolic Panel (14)    URINALYSIS, AUTO, W/O SCOPE    Prevnar 20 (PCV20) [25817]       Meds This Visit:  Requested Prescriptions     Signed Prescriptions Disp Refills    levocetirizine 5 MG Oral Tab 30 tablet 0     Sig: Take 1 tablet (5 mg total) by mouth every evening.       Imaging & Referrals:  PCV20 VACCINE FOR INTRAMUSCULAR USE  XR CHEST PA + LAT CHEST (CPT=71046)      HOME SAFETY CHECKLIST   Provided by the [employer]     Individuals  living with Alzheimer's and other dementias are at increased risk for injury or harm in certain areas of the home. As the disease progresses, they may become unaware of the dangers that exist. Consider taking the following precautions to create a safe environment, which may prevent dangerous situations from occurring and help maximize the person's independence for as long as possible.      General Home Safety Tips      Store potentially hazardous items, such as medication, alcohol, matches, sharp objects or small appliances and tools, in a securely locked cabinet.   Keep all cleaning products, such as liquid laundry pacs and bleach, out of sight or secured to avoid possible ingestion of harmful chemicals.   Keep the number for the local poison control center handy or saved in your phone in case of emergency.   Make sure carbon monoxide and smoke detectors and fire extinguishers are available and inspected regularly. Replace batteries twice a year during daylight saving time.   Remove tripping hazards such as throw rugs, extension cords and excessive clutter.   Keep walkways and rooms well lit.   Secure large furniture, such as book shelves, cabinets or large TVs, to prevent tipping.   Ensure chairs have armrests to provide support when going from a sitting to standing position.   Apply stickers to glass doors at eye-level to ensure doors are visible.   Install a latch or deadbolt either above or below eye-level on all doors.   Remove locks on interior doors to prevent the person living with dementia from locking themselves in.   Consider removing firearms from the home or storing them in a locked cabinet.   Consider enrolling in a wandering response service. Contact the [employer] 24/7 Helpline (824.716.0310) for more information.     Kitchen      Use appliances that have an automatic shut-off feature.   Prevent unsafe stove usage by applying stove knob covers, removing knobs or turning off the gas when  the stove is not in use.   Disconnect the garbage disposal.   Jef food with purchase date; regularly check for and throw away  items.   Discard toxic plants and decorative fruits that may be mistaken for real food.   Remove vitamins, prescription drugs, sugar substitutes and seasonings from the kitchen table and counters.   Laundry Room     Clean out lint screens and dryer ducts regularly to prevent fires.   Consider installing safety locks on washing machines and dryers to prevent inappropriate items being put in or taken out too early.   Install locks on laundry chutes to avoid temptation to climb into or drop inappropriate items down the chute.   Keep all cleaning products, such as liquid laundry pacs and bleach, out of sight or secured to avoid possible ingestion of harmful chemicals.      Bathroom     Install grab bars for the shower, tub and toilet to provide additional support.   Set the water temperature at 120 degrees Fahrenheit or below to prevent scalding.   Apply textured stickers to slippery surfaces to prevent falls.      Bedroom      Closely monitor the use of an electric blanket, heater or heating pad to prevent burns or other injuries.   Provide seating near the bed to help with dressing.   Ensure closet shelves are at an accessible height so that items are easy to reach, which may prevent the person from climbing shelves or objects falling from overhead.     Garage and Basement     Limit access to large equipment such as lawn mowers, weed trimmers or snow blowers.   Keep poisonous chemicals, such as gasoline or paint thinner, out of reach.   Lock and properly store ladders when not in use to prevent a tripping or climbing hazard.   Remove access to car keys if the individual living with dementia is no longer driving.   Install a motion sensor on the garage door.    Jef stairs with bright tape and ensure railings are sturdy and secure to prevent tripping or falls.      RTC 6 months for FU.    Understanding Advance Care Planning  Advance care planning is the process of deciding one’s own future medical care. It helps assure that if you can’t speak for yourself, your wishes can still be carried out. The plan is a series of legal documents that note a person’s wishes. The documents vary by state. Advance care planning should be discussed at a regular office visit with your primary care provider before an acute illness. Advance care planning is encouraged when a person has a serious illness that is expected to get worse. It may also be done before major surgery. And it can help you and your family be prepared in case of a major illness or injury. Advance care planning helps with making decisions at these times.     Who will speak on your behalf?  A healthcare proxy is a person who acts as the voice of a patient when the patient can’t speak for himself or herself. The name of this role varies by state. It may be called a Durable Medical Power of  or Durable Power of  for Healthcare. It may be called an agent, surrogate, or advocate. Or it may be called a representative or decision maker. It's an official duty that is identified by a legal document. The document also varies by state.   Why is advance care planning important?   If a person communicates his or her healthcare wishes:   He or she will be given medical care that matches his or her values and goals.  Family members will not be forced to make decisions in a crisis with no guidance.  Creating a plan  Making an advance care plan is often done in 3 steps:   Thinking about one’s wishes. To create an advance care plan, think about what kind of medical treatment you would want if you lose the ability to communicate. Are there any situations in which you would refuse or stop treatment? Are there therapies you would want or not want? And whom do you want to make decisions for you? There are many places to learn more about how to plan for  your care. Ask your healthcare provider or  for resources.  Picking a healthcare proxy. This means choosing a trusted person to speak for you only when you can’t speak for yourself. When you can't make medical decisions, your proxy makes sure the instructions in your advance care plan are followed. A proxy doesn't make decisions based on his or her own opinions. They must put aside those opinions and values if needed, and carry out your wishes.  Filling out the legal documents. There are several kinds of legal documents for advance care planning. Each one tells healthcare providers your wishes. The documents may vary by state. They must be signed and may need to be witnessed or notarized. You can cancel or change them whenever you wish. Depending on your state, the documents may include a Healthcare Proxy form, Living Will, Durable Medical Power of , and Advance Directive.  The family’s role  The best help a family can give is to support their loved one’s wishes. Open and honest communication is vital. Family should express any concerns they have about the patient’s choices while the patient can still make decisions in the event that his or her illness prevents communicating those wishes at a later time.    [employer] last reviewed this educational content on 12/1/2019    © 7808-6299 [employer]. All rights reserved. This information is not intended as a substitute for professional medical care. Always follow your healthcare professional's instructions.          Advance Medical Directive  An advance medical directive is a form that lets you plan ahead for the care you’d want if you could no longer express your wishes. This statement outlines the medical treatment you’d want or names the person you’d wish to make healthcare decisions for you. Be aware that laws vary from state to state, and it may be worthwhile to talk with an .     Writing down your wishes  An advance  directive is important whether you’re young or old. Injury or illness can strike at any age.  Decide what is important to you and the kind of treatment you’d want, or not want to have.  Some states allow only one kind of advance directive. Some let you do both a durable power of  for healthcare and a living will. Some states put both kinds on the same form.    A durable power of  (POA) for healthcare   This form lets you name someone else that you have chosen and trust to speak and make decisions on your behalf.  This person can decide on treatment for you only when you can’t speak for yourself.  You don't need to be at the end of your life. They could speak for you if you were in a coma but were likely to recover.    A living will  This form lets you list the care you want at the end of your life.  A living will applies only if you won’t live without medical treatment. It would apply if you had advanced cancer, a massive stroke, or other serious illness from which you will not recover.  It takes effect only when you can no longer express your wishes yourself.    [employer] last reviewed this educational content on 2/1/2021 © 2000-2021 [employer]. All rights reserved. This information is not intended as a substitute for professional medical care. Always follow your healthcare professional's instructions.          Choosing an Agent  A durable power of  for healthcare is only as good as the person you name to be your agent. Your agent is the person you have chosen to speak and make decisions on your behalf. If this person knows your treatment wishes and is willing to carry them out, you’ll probably be well represented. Be sure to tell your agent what’s important to you.     Who to choose  Here are suggestions for choosing an agent:  You can name a family member, close friend, , , or rabbi.  You should name one person as your agent. Then name one or two alternates.  You need a backup person in case your first choice can’t be reached when needed.  Talk with each person you're thinking of naming as your agent or alternate. Do this before you decide who should carry out your wishes.  Your agent should be ...  A competent adult, age 18 or older  Someone you trust and can talk to about the care you want and what's important to you  Someone who supports your treatment choices    In many states, your agent can’t be ...   Your healthcare provider  An employee of your provider or of a hospital, nursing home, or hospice program where you receive care  Some states have other restrictions on who can be named as an agent for an advance directive.   Be prepared  Tip: It's a good idea to write down your wishes and give a copy to your agent and all others who are involved with your healthcare.   [employer] last reviewed this educational content on 8/1/2021    © 1929-0158 [employer]. All rights reserved. This information is not intended as a substitute for professional medical care. Always follow your healthcare professional's instructions.          Understanding DNR Orders  Do not resuscitate (DNR) orders tell hospital staff not to do potentially life-restoring measures, such as CPR, if you or your loved one's heart and lungs stop working. It allows a natural death, and prevents healthcare staff from artificially reviving a person and placing them on life support. In many states, a DNR order also applies to staff outside the hospital such as in nursing homes and emergency medical services. A DNR order must be written by a healthcare provider. Or in some cases, certain other healthcare workers write it. This can only be done with the person’s or family’s consent. If a person has not written an advance directive, their family will decide on a DNR with the help of the healthcare team.   The person can cancel a DNR order at any time. The healthcare team can answer questions about the  DNR form. Have copies of a DNR form are readily available so that your wishes can be faithfully followed.   Writing a DNR order  When might a DNR order be written? When the person’s health condition is such that, in the case of cardiac arrest, CPR and other resuscitation methods are not desired. This could be because the chance of successful resuscitation is very low. Or it could be because the care plan now focuses on comfort measures instead of life-sustaining measures. Coma and terminal illness are instances when a DNR order might be used.   Irreversible coma  In a coma, a person does not respond to sight, sound, or touch. The heart and lungs could be working, but brain function is damaged due to trauma or disease.   Terminal illness  In the last stages of heart disease, AIDS, cancer, and other illnesses, some people don’t want to prolong their suffering. If recovery isn’t likely and quality of life is poor or getting worse, a person or their family may agree to a DNR order.   DNR orders and hospice care  A hospice program can offer care during the final weeks of life. Hospice programs provide dignity, pain control and comfort care in the home or at special facilities. Hospice does not provide aggressive treatment. In fact, a DNR order will likely be discussed before a person is admitted to hospice. A  or  may be able to help you arrange for hospice support.   Documenting end-of-life wishes  In addition to DNR orders, a person with a serious, life-limiting illness may wish to document their treatment wishes. This is called a POST form or by different names, depending on the state. It's meant to provide a portable medical order to help guide healthcare providers on the specific medical treatments a person wants during a medical emergency. It's meant to complement a DNR order, not replace it. Your healthcare provider can tell you more and help you complete the forms. The form may be called  one of these:   MOLST (medical orders for life-sustaining treatment)  POLST (physician orders for life-sustaining treatment)  MOST (medical orders for scope of treatment)  POST (physician orders for scope of treatment)  TPOPP (transportable physician orders for patient preferences)  Jaimee last reviewed this educational content on 7/1/2021 © 2000-2021 [employer]. All rights reserved. This information is not intended as a substitute for professional medical care. Always follow your healthcare professional's instructions.          An Agent’s Role for Durable Power of  for Health Care   It’s impossible to know which medical treatment choices you might face in the future. What if you aren't able to make these decisions for yourself? A durable power of  for healthcare lets you name an agent to speak and carry out your wishes on your behalf. This happens only if you can’t express your wishes yourself.     An agent’s duty  Your agent respects your wishes in the following ways:    Your agent’s duty is to see that your wishes are followed.  If your wishes aren’t known, your agent should try to decide what you want.  Your agent’s choices come before anyone else’s wishes for you.  A durable power of  for healthcare doesn't not give your agent control over your money . Your agent also can’t be made to pay your bills.  Find out what your agent can do  Restrictions on what an agent can and can’t do vary by state. Check your state laws. In most states your agent can:   Choose or refuse life-sustaining and other medical treatment on your behalf  Consent to treatment, and then stop treatment if your condition doesn’t improve  Access and release your medical records  Request an autopsy and donate your organs, unless you’ve stated otherwise in your advance directive  Find out whether your state allows your agent to do the following:   Refuse or withdraw life-enhancing care  Refuse or stop  tube feeding or other life-sustaining care--even if you haven’t stated on your advance directive that you don’t want these treatments  Order sterilization or   Jaimee last reviewed this educational content on 2021 [employer]. All rights reserved. This information is not intended as a substitute for professional medical care. Always follow your healthcare professional's instructions.          Being a Healthcare Proxy  A healthcare proxy is someone who represents a person who can’t speak for themself. The name of this role varies by state. It may be called a Durable Medical Power of . It may be called a Durable Power of  for Healthcare. It may be called an agent, surrogate, or advocate. Or it may be called a representative or decision maker. It's an official duty that is noted by a legal document. The document also varies by state. The person must name you as his or her proxy on the document.      A healthcare proxy speaks for another person when he or she is not able to do so. The proxy helps make sure the person’s healthcare wishes are known and followed.     What it means to be a healthcare proxy   Your role as healthcare proxy starts when the person can’t make medical decisions. This assessment can only be made by a licensed doctor. You then make the healthcare decisions as needed. You do this by carrying out the person’s wishes. These wishes are noted in his or her advance care planning documents. These declare what kind of treatment the person wishes to have or not have. You may need to put aside your own values and opinions to carry out the person’s wishes. This may include refusing or stopping life-sustaining treatments.   Documenting end-of-life wishes   As a healthcare proxy, encourage the person to discuss his or her wishes, while they are able. They can do this with their healthcare provider and then document the wishes as a medical order. The  provider can help the person complete the form. The forms are known by different names depending on the state. The form may be called one of these:     MOLST (medical orders for life-sustaining treatment)  POLST (physician orders for life-sustaining treatment)  MOST (medical orders for scope of treatment)  POST (physician orders for scope of treatment)  TPOPP (transportable physician orders for patient preferences)    The form documents the person’s wishes at the end of life. It's not tied to a certain healthcare provider or facility. It's different than a living will. The form is an order written according to state regulations by a healthcare provider. To complete one, the person must express his or her wishes to an advanced healthcare provider. If the person can’t make his or her own decisions, then this is done by the person’s healthcare proxy.   Carrying out your role  Your duties depend on what the person’s advance care planning documents say. Your duties may also depend on state law. In general:   Before accepting a role as a proxy, talk with the person. Be sure you know his or her wishes. Ask questions. This will help you be his or her voice if and when it is needed.  Be sure that the person’s healthcare team knows that you are his or her proxy. Carry a copy of the document and proof of your identity.  Make sure the healthcare team has a copy of the advance care planning documents.  Talk to the healthcare team. Ask questions as often as you need. Stay informed about the person’s condition.  Ask for any help you need to understand the medical situation. Ask about the person’s condition and prognosis. Ask about risks and benefits of tests and treatments. Find out all the facts and options.  Speak on the person’s behalf with the healthcare team when needed.  Talk with family members and keep them informed.  Know your rights. You have the right to ask for information. You can ask for consultations and second  opinions. You have the right to request or refuse treatment for the person. You may be able to review his or her medical chart. You can authorize the person’s transfer to another facility. You can also request a new healthcare provider for him or her. If you are not sure what your rights are at any time, ask a .  When it’s time to make decisions  If the person’s wishes are clear in the advance care plan documents, ask for them to be carried out as noted. If they are not clear, talk with the healthcare team. Listen to the team’s recommendations. Talk with a  or counselor. It may be hard for you to make a decision at times. You may feel sad or upset about a decision. Being a healthcare proxy is not an easy role. But it's an important one. Remember that the person trusts you to carry out his or her wishes.   If you need help  Ask the healthcare team if you have trouble with a decision. The healthcare team will help you.  Encourage the person you are helping to have a conversation with their provider about their end-of-life wishes. The provider can help them fill out the form.  You may need help in resolving family conflicts. Ask the hospital or clinic , ethics consultant, or a  for help.  If you are having trouble talking with the healthcare team while the person is in the hospital, reach out to the patient relations department. Or ask to speak to the hospital ombudsman or ethics committee.    Jaimee last reviewed this educational content on 9/1/2019    © 6187-8980 [employer]. All rights reserved. This information is not intended as a substitute for professional medical care. Always follow your healthcare professional's instructions.          Life Support  If you understand how specific treatments may affect your quality of life, you can decide which ones you’d choose or refuse. You may want to talk to your healthcare provider about the  possible benefits and risks of treatments. The chance of good results from these therapies varies based on each individual clinical situation and can be very hard to predict. Medical treatment, if your life is in danger, falls into 3 main categories.     Life supporting  This care keeps your heart and lungs going when they can no longer work on their own.  CPR restarts your heart and lungs if they stop working.  A respirator (or ventilator) keeps you breathing. Air is pumped into your lungs through a tube that’s put into your windpipe.    Life sustaining  This care keeps you alive longer when you have an illness that can’t be cured.  Tube feeding or TPN (total parenteral nutrition) provides food and fluids through a tube or IV (intravenous). It is given if you can’t chew or swallow on your own.  Dialysis is a kidney machine that cleans your blood when your kidneys can no longer work on their own.    Life enhancing  This care controls pain and discomfort, such as nausea or trouble breathing. This type of care is not designed to prolong your life, but to enhance comfort and quality of life. Nothing is done to keep you alive longer.  Hospice care is comfort care. It might provide food and fluids by mouth or help with bathing. Hospice care is given during the last stages of a terminal illness.  Strong pain medicine can be given to help keep you comfortable.    Do Not Resuscitate (DNR)  Would you want CPR if your heart stops while you’re a patient in a hospital or nursing home? If not, talk to your healthcare provider about issuing a DNR (Do-Not-Resuscitate) order.   DNRs and advance directives may not apply during anesthesia, in emergency rooms, or when emergency medical teams respond to a 911 call. Ask your healthcare provider how you can make sure your wishes will be followed. Also, a DNR will not prevent you from getting other kinds of needed medical care such as treatment for pain, or bleeding.   [employer] last  reviewed this educational content on 8/1/2021 © 2000-2021 [employer]. All rights reserved. This information is not intended as a substitute for professional medical care. Always follow your healthcare professional's instructions.          Stopping Life-Sustaining Treatments  Certain treatments can help sustain life when you have a serious illness. But as your illness progresses, there may come a time when these treatments are no longer a benefit. Decisions must then be made whether to continue or stop these treatments. This can be a hard task for you and your loved ones, but know that you’re not alone. Your healthcare provider and healthcare team will guide you through these treatment decisions. They will also help answer any questions you may have.     What are life-sustaining treatments?  Life-sustaining treatments help keep you alive if a vital body function fails. These treatments can include CPR and the use of machines to help with heart, lung, or kidney function. They can also include the use of tubes to deliver food, fluids, blood, and medicines to the body. Blood transfusions and antibiotics are also types of life-sustaining treatments.   What happens if life-sustaining treatments are continued?  These treatments can help extend your life. But they will not cure your illness. If you are near the end of your life, you may find it hard to handle the side effects and problems that can occur with these treatments. In this case, the treatments may be too much of a burden on your body. They may cause more harm than good. They may also disrupt the natural dying process and prolong suffering.   What happens if life-sustaining treatments are stopped?  If these treatments are stopped, the focus of treatment will shift to comfort care. This involves measures to control pain and other symptoms you may have. These measures are not meant to cure your illness or help you live longer. Instead, they are  meant to improve your quality of life during the time you have left. If you are in the end stage of your illness, you may be referred to hospice by your healthcare provider. Hospice provides end-of-life care. This includes emotional, spiritual, and social support for you and your loved ones.   How do I decide whether to stop life-sustaining treatments?  Your healthcare provider and healthcare team will talk with you about the specific treatments that are part of your care plan. If you want, you may include family and friends in these meetings. Here are some questions to think about or ask your healthcare team:   Is there is a chance that my illness will improve? Or will it continue to get worse?  What are my goals of care? Do I want to extend the time I have left? Or do I want to focus my care on comfort and managing symptoms?  How will stopping or continuing these treatments affect my health? Will they enhance my comfort and quality of life? Or will they cause more problems?  Consider your own values or sami. Also ask for advice from those who share your values.  How do I state my decisions about life-sustaining treatments?  Once you’ve made your decision to continue or stop specific treatments, you can tell your healthcare provider directly. It's best to also put your treatment wishes in writing with advance directives. These are legal forms related to healthcare decisions. Laws about advance directives vary from state to state. Ask your healthcare provider about what forms are needed to make sure your wishes will be followed. Some common forms include:   A durable power of  for healthcare or a healthcare proxy form.  This form lets you name a person to make treatment decisions for you when you can’t. This person is often called a healthcare proxy, medical or healthcare power of , or agent.  A living will.  This form tells others the kinds of treatment you want or don’t want if you become too ill  or injured to speak for yourself.  Orders for life-sustaining treatments.  These are actual healthcare provider's orders that must be followed by other medical providers. The form belongs to you, not to the healthcare provider or hospital.). These are legal forms obtained from your healthcare provider or hospital that document your wishes. The forms are known by different names depending on the state. Common names include:  MOLST (medical orders for life-sustaining treatment)  POLST (physician orders for life-sustaining treatment)  MOST (medical orders for scope of treatment)  POST (physician orders for scope of treatment)  TPOPP (transportable physician orders for patient preferences)     Keep in mind that you can change or cancel an advance directive at any time. Make it a practice to review your decisions each time there is a change in your health or goals of care. Also be sure to tell your healthcare proxy and loved ones of any changes in your decisions.   Deciding whether to stop life-sustaining treatments for a loved one   The decision to stop treatment ideally is made with the person’s consent. If the person is not capable of making decisions and has no advance directive, the decision falls to the person’s healthcare proxy or other adult. If you need to make a decision about stopping treatment for a loved one, start by talking to his or her healthcare provider. Review the goals of care and the benefits and burdens of specific treatments on your loved one’s health. Also think about your loved one’s wishes and values. If needed, seek advice from other healthcare team members, like a  or .   [employer] last reviewed this educational content on 12/1/2019 © 2000-2021 [employer]. All rights reserved. This information is not intended as a substitute for professional medical care. Always follow your healthcare professional's instructions.   Audie Houston's SCREENING  SCHEDULE   Tests on this list are recommended by your physician but may not be covered, or covered at this frequency, by your insurer.   Please check with your insurance carrier before scheduling to verify coverage.   PREVENTATIVE SERVICES FREQUENCY &  COVERAGE DETAILS LAST COMPLETION DATE   Diabetes Screening    Fasting Blood Sugar / Glucose    One screening every 12 months if never tested or if previously tested but not diagnosed with pre-diabetes   One screening every 6 months if diagnosed with pre-diabetes Lab Results   Component Value Date     (H) 02/27/2023        Cardiovascular Disease Screening    Lipid Panel  Cholesterol  Lipoprotein (HDL)  Triglycerides Covered every 5 years for all Medicare beneficiaries without apparent signs or symptoms of cardiovascular disease Lab Results   Component Value Date    CHOLEST 117 03/29/2024    HDL 40 03/29/2024    LDL 52 03/29/2024    TRIG 146 03/29/2024         Electrocardiogram (EKG)   Covered if needed at Welcome to Medicare, and non-screening if indicated for medical reasons 06/21/2023      Ultrasound Screening for Abdominal Aortic Aneurysm (AAA) Covered once in a lifetime for one of the following risk factors    Men who are 65-75 years old and have ever smoked    Anyone with a family history -     Colorectal Cancer Screening  Covered for ages 50-85; only need ONE of the following:    Colonoscopy   Covered every 10 years    Covered every 2 years if patient is at high risk or previous colonoscopy was abnormal -    No recommendations at this time    Flexible Sigmoidoscopy   Covered every 4 years -    Fecal Occult Blood Test Covered annually -   Prostate Cancer Screening    Prostate-Specific Antigen (PSA) Annually Lab Results   Component Value Date    PSA 3.35 03/09/2021     Health Maintenance   Topic Date Due    PSA  03/09/2022      Immunizations    Influenza Covered once per flu season  Please get every year 09/20/2023  No recommendations at this time     Pneumococcal Each vaccine ([id number] & [id number]) covered once after 65 Prevnar 13: 10/01/2019    [id number]: 07/08/2021     No recommendations at this time    Hepatitis B One screening covered for patients with certain risk factors   -  No recommendations at this time    Tetanus Toxoid Not covered by Medicare Part B unless medically necessary (cut with metal); may be covered with your pharmacy prescription benefits -    Tetanus, Diptheria and Pertusis TD and TDaP Not covered by Medicare Part B -  No recommendations at this time    Zoster Not covered by Medicare Part B; may be covered with your pharmacy  prescription benefits -  No recommendations at this time     Annual Monitoring of Persistent Medications (ACE/ARB, digoxin diuretics, anticonvulsants)    Potassium Annually Lab Results   Component Value Date    K 4.6 02/27/2023         Creatinine   Annually Lab Results   Component Value Date    CREATSERUM 1.18 06/21/2023         BUN Annually Lab Results   Component Value Date    BUN 21 (H) 02/27/2023       Drug Serum Conc Annually No results found for: \"DIGOXIN\", \"DIG\", \"VALP\"

## 2024-04-29 NOTE — PROGRESS NOTES
HPI:    Patient ID: Audie Houston is a 89 year old male.    Audie Houston is a 89 year old male who presents for a complete physical exam.   Audie Houston is a 89 year old male who presents for a Medicare Assessment.     Chief Complaint   Patient presents with    Wellness Visit     Patient states he is here for Medicare Wellness Visit.         Hypertension  Patient is here for follow up of hypertension. BP at home: not check. Has machine.   Has been compliant with medications.  Exercise level: not active and has been following low salt diet.  Weight has been down. Daughter cooks. If eats bauer spits and soft food. Not eat out. ? Adds salt.   Wt Readings from Last 3 Encounters:   04/29/24 214 lb 9.6 oz (97.3 kg)   04/18/24 219 lb (99.3 kg)   04/09/24 219 lb (99.3 kg)     BP Readings from Last 3 Encounters:   04/29/24 139/68   04/09/24 120/68   10/23/23 135/74     Labs:   Lab Results   Component Value Date/Time     (H) 02/27/2023 03:23 PM     02/27/2023 03:23 PM    K 4.6 02/27/2023 03:23 PM     02/27/2023 03:23 PM    CO2 29.0 02/27/2023 03:23 PM    CREATSERUM 1.18 06/21/2023 11:00 AM    CA 9.1 02/27/2023 03:23 PM    AST 16 07/07/2022 10:34 AM    ALT 35 07/07/2022 10:34 AM    TSH 4.844 (H) 03/29/2024 09:20 AM    T4F 1.0 03/29/2024 09:20 AM        Lab Results   Component Value Date/Time    CHOLEST 117 03/29/2024 09:20 AM    HDL 40 03/29/2024 09:20 AM    TRIG 146 03/29/2024 09:20 AM    LDL 52 03/29/2024 09:20 AM    NONHDLC 77 03/29/2024 09:20 AM          Wt Readings from Last 3 Encounters:   04/29/24 214 lb 9.6 oz (97.3 kg)   04/18/24 219 lb (99.3 kg)   04/09/24 219 lb (99.3 kg)     BP Readings from Last 3 Encounters:   04/29/24 139/68   04/09/24 120/68   10/23/23 135/74       Labs:   Lab Results   Component Value Date/Time     (H) 02/27/2023 03:23 PM     02/27/2023 03:23 PM    K 4.6 02/27/2023 03:23 PM     02/27/2023 03:23 PM    CO2 29.0 02/27/2023 03:23 PM    CREATSERUM 1.18  06/21/2023 11:00 AM    CA 9.1 02/27/2023 03:23 PM    AST 16 07/07/2022 10:34 AM    ALT 35 07/07/2022 10:34 AM    TSH 4.844 (H) 03/29/2024 09:20 AM    T4F 1.0 03/29/2024 09:20 AM        Lab Results   Component Value Date/Time    CHOLEST 117 03/29/2024 09:20 AM    HDL 40 03/29/2024 09:20 AM    TRIG 146 03/29/2024 09:20 AM    LDL 52 03/29/2024 09:20 AM    NONHDLC 77 03/29/2024 09:20 AM          Labs:   Lab Results   Component Value Date/Time    WBC 8.4 02/27/2023 03:23 PM    HGB 14.9 02/27/2023 03:23 PM    .0 02/27/2023 03:23 PM      Lab Results   Component Value Date/Time     (H) 02/27/2023 03:23 PM     02/27/2023 03:23 PM    K 4.6 02/27/2023 03:23 PM     02/27/2023 03:23 PM    CO2 29.0 02/27/2023 03:23 PM    CREATSERUM 1.18 06/21/2023 11:00 AM    CA 9.1 02/27/2023 03:23 PM    ALB 3.2 (L) 07/07/2022 10:34 AM    TP 6.9 07/07/2022 10:34 AM    ALKPHO 62 07/07/2022 10:34 AM    AST 16 07/07/2022 10:34 AM    ALT 35 07/07/2022 10:34 AM    BILT 0.6 07/07/2022 10:34 AM    TSH 4.844 (H) 03/29/2024 09:20 AM    T4F 1.0 03/29/2024 09:20 AM        Lab Results   Component Value Date/Time    CHOLEST 117 03/29/2024 09:20 AM    HDL 40 03/29/2024 09:20 AM    TRIG 146 03/29/2024 09:20 AM    LDL 52 03/29/2024 09:20 AM    NONHDLC 77 03/29/2024 09:20 AM       No results found for: \"A1C\"   No results found for: \"VITD\"      No recommendations at this time    Allergies:  Allergies   Allergen Reactions    Pcn [Penicillins] NAUSEA AND VOMITING    Seasonal OTHER (SEE COMMENTS)     Watery eyes, coughing, sneezing       Current Outpatient Medications   Medication Sig Dispense Refill    levocetirizine 5 MG Oral Tab Take 1 tablet (5 mg total) by mouth every evening. 30 tablet 0    traMADol 50 MG Oral Tab Take 1 tablet (50 mg total) by mouth nightly as needed for Pain. 30 tablet 2    montelukast 10 MG Oral Tab Take 1 tablet (10 mg total) by mouth nightly. 90 tablet 3    finasteride 5 MG Oral Tab Take 1 tablet (5 mg total) by  mouth daily. 90 tablet 3    gabapentin 100 MG Oral Cap Take 3 capsules (300 mg total) by mouth nightly. 90 capsule 1    fluticasone propionate 50 MCG/ACT Nasal Suspension 2 sprays by Each Nare route daily. 1 each 3    tamsulosin 0.4 MG Oral Cap Take 1 capsule (0.4 mg total) by mouth daily. 90 capsule 3    losartan 25 MG Oral Tab Take 1 tablet (25 mg total) by mouth daily. 90 tablet 3    simvastatin 20 MG Oral Tab Take 1 tablet (20 mg total) by mouth nightly. 90 tablet 3    omeprazole 20 MG Oral Capsule Delayed Release Take 1 capsule (20 mg total) by mouth daily. 90 capsule 3    Memantine HCl ER 28 MG Oral Capsule SR 24 Hr Take 1 capsule (28 mg total) by mouth daily.      QUEtiapine Fumarate 25 MG Oral Tab TAKE 1 TABLET BY MOUTH AT BEDTIME CAN GIVE ADDITIONAL TABLET AS NEEDED FOR AGITATION 180 tablet 0    aspirin 81 MG Oral Tab Take 1 tablet (81 mg total) by mouth daily.      acetaminophen (TYLENOL EXTRA STRENGTH) 500 MG Oral Tab Take 1 tablet (500 mg total) by mouth every 6 (six) hours as needed for Pain.      Vitamin D3 (VITAMIN D3) 2000 UNITS Oral Cap Take 1 capsule (2,000 Units total) by mouth daily.      Vitamin B-12 (VITAMIN B12) 1000 MCG Oral Tab Take 1 tablet (1,000 mcg total) by mouth daily.        Past Medical History:    Acid reflux    Allergic rhinitis    Anxiety    Arthritis    Cataract    OU    Dementia (HCC)    Depression    Dermatochalasis of both eyelids    OU    Elevated PSA    Negative biopsies    Elevated PSA    and free PSA at 19%-Negative biopsies    Essential hypertension    Floaters    OD    Foot drop    w/ weakness    Obesity    Other and unspecified hyperlipidemia    Pinguecula of both eyes    OU    Stomach ulcer    per: NG-bleeding    Traumatic brain injury (HCC)      Past Surgical History:   Procedure Laterality Date    Back surgery      2 back surgeries    Colonoscopy  2000,2007    per: NG    Colonoscopy      Other surgical history  2000, 2003    x2 negative prostate biopsies      Family  History   Problem Relation Age of Onset    Cancer Sister         metastatic cancer (brain)    Heart Disease Father 84        CAD-(cause of death)    Cancer Sister 37        bone cancer (cause of death)    Other (Other) Other         No vascular disease    Asthma Daughter     Obesity Daughter     Glaucoma Neg     Diabetes Neg     Macular degeneration Neg       Social History:  Social History     Socioeconomic History    Marital status:    Tobacco Use    Smoking status: Former     Current packs/day: 0.00     Types: Cigarettes     Quit date: 1955     Years since quittin.3    Smokeless tobacco: Never   Vaping Use    Vaping status: Never Used   Substance and Sexual Activity    Alcohol use: No    Drug use: No   Other Topics Concern    Caffeine Concern Yes     Comment: decaf coffee,tea-1 cup/day    Exercise No    Reaction to local anesthetic No       History/Other:     Patient Active Problem List   Diagnosis    Memory loss    Dementia (HCC)    Hypothyroidism    Age-related nuclear cataract of both eyes    Essential hypertension, benign    Hyperlipidemia    Diverticulum of bladder    Vaccine counseling    CKD (chronic kidney disease) stage 3, GFR 30-59 ml/min ([facility])    Severe obesity (BMI 35.0-39.9) with comorbidity (HCC)    Pain     Tobacco:  He smoked tobacco in the past but quit greater than 12 months ago.  Social History     Tobacco Use   Smoking Status Former    Current packs/day: 0.00    Types: Cigarettes    Quit date: 1955    Years since quittin.3   Smokeless Tobacco Never        CAGE Alcohol Screen:   CAGE screening score of 0 on 2024, showing low risk of alcohol abuse.        Patient Care Team:  Dacia Oseguera MD as PCP - General (Internal Medicine)  Rosalee Watters APRN as Palliative Care Provider (Nurse Practitioner)  Katalina Rojas MD as Consulting Physician (OTOLARYNGOLOGY)  Ej Robert MD as Referring Physician (NEUROLOGY)  Vanessa Costa MD as Consulting Physician  (DERMATOLOGY)  Bites. Itches. Tried gold bond and calamine. Scratches hard.  History of dry skin for years.  Has seen dermatology in the past.  Occasionally itches at night.  Mainly on lower extremities.  Has not used anything new i.e. lotions products detergents soaps etc.  No new medication changes to explain symptoms.  Tried flea medicine for dog even though dog was not itching.    Sees neurology Dr. Robert. [place] and FU 6 months. Forgets eats and raids fridge.  Tosses and turns in bed and falls off.  Has had 2 falls without loss of consciousness no head trauma as a result of sitting on the edge of the bed and falling.  Sits up on the edge of the bed to urinate at night and then falls off the bed.  Was wondering about bed rails or hospital bed.  Has the prescription.    Stopped tramadol and gave gabapentin. But more pain and thus stopped gabapentin after 1 night. Back pain chronic.     Cough  This is a new problem. The current episode started 1 to 4 weeks ago. The problem has been unchanged. The problem occurs constantly. The cough is Non-productive. Associated symptoms include ear congestion and heartburn. Pertinent negatives include no chest pain, chills, ear pain, eye redness, fever, headaches, hemoptysis, nasal congestion, postnasal drip, rash, rhinorrhea, sore throat, shortness of breath or wheezing. Nothing aggravates the symptoms. He has tried nothing for the symptoms.   Itching  This is a chronic problem. The current episode started more than 1 year ago. The problem occurs constantly. The problem has been unchanged. Associated symptoms include coughing. Pertinent negatives include no abdominal pain, chest pain, chills, congestion, diaphoresis, fatigue, fever, headaches, nausea, numbness, rash, sore throat, vomiting or weakness.       Review of Systems   Constitutional: Negative.  Negative for activity change, appetite change, chills, diaphoresis, fatigue, fever and unexpected weight change.   HENT:  Negative.  Negative for congestion, dental problem, drooling, ear discharge, ear pain, facial swelling, hearing loss, mouth sores, nosebleeds, postnasal drip, rhinorrhea, sinus pressure, sinus pain, sneezing, sore throat, tinnitus, trouble swallowing and voice change.    Eyes: Negative.  Negative for photophobia, pain, discharge, redness, itching and visual disturbance.   Respiratory:  Positive for cough. Negative for apnea, hemoptysis, choking, chest tightness, shortness of breath, wheezing and stridor.    Cardiovascular: Negative.  Negative for chest pain, palpitations and leg swelling.   Gastrointestinal:  Positive for heartburn. Negative for abdominal distention, abdominal pain, anal bleeding, blood in stool, constipation, diarrhea, nausea, rectal pain and vomiting.   Endocrine: Negative for cold intolerance, heat intolerance, polydipsia, polyphagia and polyuria.   Genitourinary: Negative.  Negative for decreased urine volume, difficulty urinating, dysuria, flank pain, frequency, genital sores, hematuria, penile discharge, penile pain, penile swelling, scrotal swelling, testicular pain and urgency.   Skin:  Positive for itching. Negative for color change, pallor, rash and wound.   Neurological: Negative.  Negative for dizziness, tremors, seizures, syncope, facial asymmetry, speech difficulty, weakness, light-headedness, numbness and headaches.   Psychiatric/Behavioral:  Positive for agitation, behavioral problems and sleep disturbance (Gets up 2 times qhs but able t fall asleep.). Negative for confusion, decreased concentration, dysphoric mood, hallucinations, self-injury and suicidal ideas. The patient is not nervous/anxious and is not hyperactive.    All other systems reviewed and are negative.          Functional Ability/Status:   Audie Houston has some abnormal functions as listed below:  He has Dressing and/or Bathing issues based on screening of functional status.  Difficulty dressing or bathing?:  Yes  Bathing or Showering: Cannot do without help  Dressing: Need some help  He has Driving difficulties based on screening of functional status. He has Meal Preparation difficulties based on screening of functional status.He has difficulties Managing Money/Bills based on screening of functional status.He has difficulties Shopping for Groceries based on screening of functional status. He has difficulties Taking Meds as Rx'd based on screening of functional status. He has Hearing problems based on screening of functional status.He has Vision problems based on screening of functional status. He has Walking problems based on screening of functional status. He has problems with Daily Activities based on screening of functional status. He has problems with Memory based on screening of functional status.         Fall Risk Assessment:   He has been screened for Falls and is High Risk. Fall Prevention information provided to patient in After Visit Summary.    Do you feel unsteady when standing or walking?: Yes  Do you worry about falling?: Yes  Have you fallen in the past year?: Yes  How many times have you fallen?: (P) 2  Were you injured?: (P) No       Medicare Hearing Assessment:   Hearing Screening    Time taken: 4/29/2024 11:25 AM  Entry User: Heidy Garcia  Screening Method: Finger Rub  Finger Rub Result: Fail         Depression Screening (PHQ-2/PHQ-9): PHQ-2 SCORE: 0  , done 4/29/2024          Cognitive Assessment:   Abnormal  What day of the week is this?: Incorrect  What month is it?: Incorrect  What year is it?: Incorrect  Recall \"Ball\": Incorrect  Recall \"Flag\": Incorrect  Recall \"Tree\": Incorrect      Supplementary Documentation:     In the past six months, have you lost more than 10 pounds without trying?: 2 - No  Has your appetite been poor?: No  Type of Diet: Balanced;Low Salt;Low Carb  How does the patient maintain a good energy level?: Other  How would you describe your daily physical activity?:  None  How would you describe your current health state?: Fair  How do you maintain positive mental well-being?: Social Interaction  On a scale of 0 to 10, with 0 being no pain and 10 being severe pain, what is your pain level?: 6 - (Moderate)  In the past six months, have you experienced urine leakage?: 1-Yes  At any time do you feel concerned for the safety/well-being of yourself and/or your children, in your home or elsewhere?: No  Have you had any immunizations at another office such as Influenza, Hepatitis B, Tetanus, or Pneumococcal?: Yes    Visual Acuity:   Right Eye Visual Acuity: Corrected Right Eye Chart Acuity: 20/70   Left Eye Visual Acuity: Corrected Left Eye Chart Acuity: 20/70   Both Eyes Visual Acuity: Corrected Both Eyes Chart Acuity: 20/70   Able To Tolerate Visual Acuity: Yes        PHYSICAL EXAM:   /68 (BP Location: Right arm, Patient Position: Sitting, Cuff Size: large)   Pulse 69   Temp 98 °F (36.7 °C) (Oral)   Ht 5' 9\" (1.753 m)   Wt 214 lb 9.6 oz (97.3 kg)   SpO2 100%   BMI 31.69 kg/m²   BP Readings from Last 3 Encounters:   04/29/24 139/68   04/09/24 120/68   10/23/23 135/74     Wt Readings from Last 3 Encounters:   04/29/24 214 lb 9.6 oz (97.3 kg)   04/18/24 219 lb (99.3 kg)   04/09/24 219 lb (99.3 kg)      Body mass index is 31.69 kg/m².   Physical Exam  Vitals and nursing note reviewed.   Constitutional:       General: He is not in acute distress.     Appearance: Normal appearance. He is well-developed and well-groomed. He is not ill-appearing, toxic-appearing or diaphoretic.      Interventions: He is not intubated.  HENT:      Head: Normocephalic and atraumatic.      Right Ear: Tympanic membrane, ear canal and external ear normal. Decreased hearing noted. No laceration, drainage, swelling or tenderness. No middle ear effusion. There is no impacted cerumen. No foreign body. No mastoid tenderness. No PE tube. No hemotympanum. Tympanic membrane is not injected, scarred,  perforated, erythematous, retracted or bulging. Tympanic membrane has normal mobility.      Left Ear: Tympanic membrane, ear canal and external ear normal. Decreased hearing noted. No laceration, drainage, swelling or tenderness.  No middle ear effusion. There is no impacted cerumen. No foreign body. No mastoid tenderness. No PE tube. No hemotympanum. Tympanic membrane is not injected, scarred, perforated, erythematous, retracted or bulging. Tympanic membrane has normal mobility.      Nose:      Right Sinus: No maxillary sinus tenderness or frontal sinus tenderness.      Left Sinus: No maxillary sinus tenderness or frontal sinus tenderness.      Mouth/Throat:      Lips: Pink. No lesions.      Mouth: Mucous membranes are moist. No injury, lacerations, oral lesions or angioedema.      Dentition: Abnormal dentition. Does not have dentures. Dental caries present. No dental tenderness, gingival swelling, dental abscesses or gum lesions.      Tongue: No lesions. Tongue does not deviate from midline.      Palate: No mass and lesions.      Pharynx: Uvula midline. No pharyngeal swelling, oropharyngeal exudate, posterior oropharyngeal erythema or uvula swelling.      Tonsils: No tonsillar exudate or tonsillar abscesses.   Eyes:      General: Lids are normal. No scleral icterus.        Right eye: No foreign body, discharge or hordeolum.         Left eye: No foreign body, discharge or hordeolum.      Extraocular Movements: Extraocular movements intact.      Right eye: Normal extraocular motion and no nystagmus.      Left eye: Normal extraocular motion and no nystagmus.      Conjunctiva/sclera: Conjunctivae normal.      Right eye: Right conjunctiva is not injected. No chemosis, exudate or hemorrhage.     Left eye: Left conjunctiva is not injected. No chemosis, exudate or hemorrhage.     Pupils: Pupils are equal, round, and reactive to light.   Neck:      Thyroid: No thyroid mass, thyromegaly or thyroid tenderness.      Vascular:  No carotid bruit or JVD.      Trachea: Trachea and phonation normal. No tracheal tenderness, tracheostomy, abnormal tracheal secretions or tracheal deviation.   Cardiovascular:      Rate and Rhythm: Normal rate and regular rhythm.      Pulses: Normal pulses.           Carotid pulses are 2+ on the right side and 2+ on the left side.       Radial pulses are 2+ on the right side and 2+ on the left side.        Dorsalis pedis pulses are 2+ on the right side and 2+ on the left side.        Posterior tibial pulses are 2+ on the right side and 2+ on the left side.      Heart sounds: Normal heart sounds, S1 normal and S2 normal.   Pulmonary:      Effort: Pulmonary effort is normal. No tachypnea, bradypnea, accessory muscle usage, prolonged expiration, respiratory distress or retractions. He is not intubated.      Breath sounds: Normal breath sounds and air entry. No stridor, decreased air movement or transmitted upper airway sounds. No decreased breath sounds, wheezing, rhonchi or rales.   Chest:      Chest wall: No tenderness.   Abdominal:      General: Bowel sounds are normal. There is no distension.      Palpations: Abdomen is soft. Abdomen is not rigid.      Tenderness: There is no abdominal tenderness. There is no right CVA tenderness, left CVA tenderness, guarding or rebound.   Musculoskeletal:      Cervical back: Neck supple.      Right lower leg: No edema.      Left lower leg: No edema.   Lymphadenopathy:      Head:      Right side of head: No submental, submandibular, preauricular, posterior auricular or occipital adenopathy.      Left side of head: No submental, submandibular, preauricular, posterior auricular or occipital adenopathy.      Cervical: No cervical adenopathy.      Right cervical: No superficial, deep or posterior cervical adenopathy.     Left cervical: No superficial, deep or posterior cervical adenopathy.      Upper Body:      Right upper body: No supraclavicular adenopathy.      Left upper body: No  supraclavicular adenopathy.   Skin:     General: Skin is warm and dry.      Coloration: Skin is not ashen, jaundiced or pale.      Findings: Abrasion present. No erythema or rash.      Nails: There is no clubbing.          Neurological:      Mental Status: He is alert and oriented to person, place, and time.   Psychiatric:         Speech: Speech normal.         Behavior: Behavior normal. Behavior is cooperative.     Bilateral barefoot skin diabetic exam is normal, visualized feet and the appearance is normal.  Bilateral monofilament sensation of both feet is normal.  Pulsation pedal pulse exam of both lower legs/feet is normal as well.            ASSESSMENT/PLAN:     Encounter Diagnoses   Name Primary?    Age-related nuclear cataract of both eyes Stable. Fu optho.    Yes    Stage 3a chronic kidney disease (HCC) No motrin, ibuprofen, advil, alleve, naprosyn  with these medications.  Check blood.        Vascular dementia without behavioral disturbance, psychotic disturbance, mood disturbance, or anxiety, unspecified Stable. dementia severity (HCC) Stable. FU neurology.        Essential hypertension, benign Stable. Careful with diet and excercise at least 30 minutes 3-4 times a week. Check blood pressures at different times on different days. Can purchase own blood pressure monitor. If not, check at local pharmacy. Bake foods more and grill occasionally. Avoid fried foods. No salt. Use other seasonings.         Diverticulum of bladder Stable.        Mixed hyperlipidemia Stable.        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        Severe obesity (BMI 35.0-39.9) with comorbidity (HCC)       Vaccine counseling PCV 20 today.        Encounter for annual health examination Check urine.        Subacute cough Check CXR, Try xyzal 5 mg at night. Discussed with patient of side effects and use of these medications.  Stop montelukast.  Stop the Xyzal if no better after 7 days of taking it with the cough.        Severe itching history of eczema.  Will try Xyzal.  If no better call.  Can try coconut oil or O'Jed's foot cream on whole body.        Orders Placed This Encounter   Procedures    Comp Metabolic Panel (14)    URINALYSIS, AUTO, W/O SCOPE    Prevnar 20 (PCV20) [85982]       Meds This Visit:  Requested Prescriptions     Signed Prescriptions Disp Refills    levocetirizine 5 MG Oral Tab 30 tablet 0     Sig: Take 1 tablet (5 mg total) by mouth every evening.       Imaging & Referrals:  PCV20 VACCINE FOR INTRAMUSCULAR USE  XR CHEST PA + LAT CHEST (CPT=71046)      HOME SAFETY CHECKLIST   Provided by the [employer]     Individuals living with Alzheimer's and other dementias are at increased risk for injury or harm in certain areas of the home. As the disease progresses, they may become unaware of the dangers that exist. Consider taking the following precautions to create a safe environment, which may prevent dangerous situations from occurring and help maximize the person's independence for as long as possible.      General Home Safety Tips      Store potentially hazardous items, such as medication, alcohol, matches, sharp objects or small appliances and tools, in a securely locked cabinet.   Keep all cleaning products, such as liquid laundry pacs and bleach, out of sight or secured to avoid possible ingestion of harmful chemicals.   Keep the number for the local poison control center handy or saved in your phone in case of emergency.   Make sure carbon monoxide and smoke detectors and fire extinguishers are available and inspected regularly. Replace batteries twice a year during daylight saving time.   Remove tripping hazards such as throw rugs, extension cords and excessive clutter.   Keep walkways and rooms well lit.   Secure large furniture, such as book shelves, cabinets or large TVs, to prevent tipping.   Ensure chairs have armrests to provide support when going from a sitting to standing position.    Apply stickers to glass doors at eye-level to ensure doors are visible.   Install a latch or deadbolt either above or below eye-level on all doors.   Remove locks on interior doors to prevent the person living with dementia from locking themselves in.   Consider removing firearms from the home or storing them in a locked cabinet.   Consider enrolling in a wandering response service. Contact the [employer]  Helpline ([phone]) for more information.     Kitchen      Use appliances that have an automatic shut-off feature.   Prevent unsafe stove usage by applying stove knob covers, removing knobs or turning off the gas when the stove is not in use.   Disconnect the garbage disposal.   Jef food with purchase date; regularly check for and throw away  items.   Discard toxic plants and decorative fruits that may be mistaken for real food.   Remove vitamins, prescription drugs, sugar substitutes and seasonings from the kitchen table and counters.   Laundry Room     Clean out lint screens and dryer ducts regularly to prevent fires.   Consider installing safety locks on washing machines and dryers to prevent inappropriate items being put in or taken out too early.   Install locks on laundry chutes to avoid temptation to climb into or drop inappropriate items down the chute.   Keep all cleaning products, such as liquid laundry pacs and bleach, out of sight or secured to avoid possible ingestion of harmful chemicals.      Bathroom     Install grab bars for the shower, tub and toilet to provide additional support.   Set the water temperature at 120 degrees Fahrenheit or below to prevent scalding.   Apply textured stickers to slippery surfaces to prevent falls.      Bedroom      Closely monitor the use of an electric blanket, heater or heating pad to prevent burns or other injuries.   Provide seating near the bed to help with dressing.   Ensure closet shelves are at an accessible height so that items  are easy to reach, which may prevent the person from climbing shelves or objects falling from overhead.     Garage and Basement     Limit access to large equipment such as lawn mowers, weed trimmers or snow blowers.   Keep poisonous chemicals, such as gasoline or paint thinner, out of reach.   Lock and properly store ladders when not in use to prevent a tripping or climbing hazard.   Remove access to car keys if the individual living with dementia is no longer driving.   Install a motion sensor on the garage door.    Jef stairs with bright tape and ensure railings are sturdy and secure to prevent tripping or falls.      RTC 6 months for FU.     1. Age-related nuclear cataract of both eyes (Primary)  2. Stage 3a chronic kidney disease (HCC)  -     Comp Metabolic Panel (14); Future; Expected date: 05/02/2024  -     URINALYSIS, AUTO, W/O SCOPE  3. Vascular dementia without behavioral disturbance, psychotic disturbance, mood disturbance, or anxiety, unspecified dementia severity (HCC)  4. Essential hypertension, benign  -     Comp Metabolic Panel (14); Future; Expected date: 05/02/2024  5. Diverticulum of bladder  6. Mixed hyperlipidemia  -     Comp Metabolic Panel (14); Future; Expected date: 05/02/2024  7. Hypothyroidism due to acquired atrophy of thyroid  8. Severe obesity (BMI 35.0-39.9) with comorbidity (HCC)  9. Vaccine counseling  10. Encounter for annual health examination  11. Subacute cough  -     XR CHEST PA + LAT CHEST (CPT=71046); Future; Expected date: 04/29/2024  12. Severe itching  Other orders  -     Prevnar 20 (PCV20) [09377]  -     Levocetirizine Dihydrochloride; Take 1 tablet (5 mg total) by mouth every evening.  Dispense: 30 tablet; Refill: 0      The patient indicates understanding of these issues and agrees to the plan.  Further testing ordered.  Imaging studies ordered.  Lab work ordered.  Reinforced healthy diet, lifestyle, and exercise.      No follow-ups on file.    Advanced Directives:   He  has a Living Will on file in [employer]; reviewed and discussed documents with patient (and family/surrogate if present).  He does have a POA but we do NOT have it on file in Epic.    Discussed Advance Care Planning with patient (and family/surrogate if present). Standard forms made available to patient in After Visit Summary.  16+ minutes was spent with all Advanced Care Planning elements today (up to 30 minutes for CPT 63089)    MD Audie Schultz's SCREENING SCHEDULE   Tests on this list are recommended by your physician but may not be covered, or covered at this frequency, by your insurer.   Please check with your insurance carrier before scheduling to verify coverage.   PREVENTATIVE SERVICES FREQUENCY &  COVERAGE DETAILS LAST COMPLETION DATE   Diabetes Screening    Fasting Blood Sugar / Glucose    One screening every 12 months if never tested or if previously tested but not diagnosed with pre-diabetes   One screening every 6 months if diagnosed with pre-diabetes Lab Results   Component Value Date     (H) 02/27/2023        Cardiovascular Disease Screening    Lipid Panel  Cholesterol  Lipoprotein (HDL)  Triglycerides Covered every 5 years for all Medicare beneficiaries without apparent signs or symptoms of cardiovascular disease Lab Results   Component Value Date    CHOLEST 117 03/29/2024    HDL 40 03/29/2024    LDL 52 03/29/2024    TRIG 146 03/29/2024         Electrocardiogram (EKG)   Covered if needed at Welcome to Medicare, and non-screening if indicated for medical reasons 06/21/2023      Ultrasound Screening for Abdominal Aortic Aneurysm (AAA) Covered once in a lifetime for one of the following risk factors    Men who are 65-75 years old and have ever smoked    Anyone with a family history -     Colorectal Cancer Screening  Covered for ages 50-85; only need ONE of the following:    Colonoscopy   Covered every 10 years    Covered every 2 years if patient is at high risk or previous  colonoscopy was abnormal -    No recommendations at this time    Flexible Sigmoidoscopy   Covered every 4 years -    Fecal Occult Blood Test Covered annually -   Prostate Cancer Screening    Prostate-Specific Antigen (PSA) Annually Lab Results   Component Value Date    PSA 3.35 03/09/2021     Health Maintenance   Topic Date Due    PSA  03/09/2022      Immunizations    Influenza Covered once per flu season  Please get every year 09/20/2023  No recommendations at this time    Pneumococcal Each vaccine ([id number] & [id number]) covered once after 65 Prevnar 13: 10/01/2019    Illfasugm95: 07/08/2021     No recommendations at this time    Hepatitis B One screening covered for patients with certain risk factors   -  No recommendations at this time    Tetanus Toxoid Not covered by Medicare Part B unless medically necessary (cut with metal); may be covered with your pharmacy prescription benefits -    Tetanus, Diptheria and Pertusis TD and TDaP Not covered by Medicare Part B -  No recommendations at this time    Zoster Not covered by Medicare Part B; may be covered with your pharmacy  prescription benefits -  No recommendations at this time     Annual Monitoring of Persistent Medications (ACE/ARB, digoxin diuretics, anticonvulsants)    Potassium Annually Lab Results   Component Value Date    K 4.6 02/27/2023         Creatinine   Annually Lab Results   Component Value Date    CREATSERUM 1.18 06/21/2023         BUN Annually Lab Results   Component Value Date    BUN 21 (H) 02/27/2023       Drug Serum Conc Annually No results found for: \"DIGOXIN\", \"DIG\", \"VALP\"                   Understanding Advance Care Planning  Advance care planning is the process of deciding one’s own future medical care. It helps assure that if you can’t speak for yourself, your wishes can still be carried out. The plan is a series of legal documents that note a person’s wishes. The documents vary by state. Advance care planning should be discussed at a  regular office visit with your primary care provider before an acute illness. Advance care planning is encouraged when a person has a serious illness that is expected to get worse. It may also be done before major surgery. And it can help you and your family be prepared in case of a major illness or injury. Advance care planning helps with making decisions at these times.     Who will speak on your behalf?  A healthcare proxy is a person who acts as the voice of a patient when the patient can’t speak for himself or herself. The name of this role varies by state. It may be called a Durable Medical Power of  or Durable Power of  for Healthcare. It may be called an agent, surrogate, or advocate. Or it may be called a representative or decision maker. It's an official duty that is identified by a legal document. The document also varies by state.   Why is advance care planning important?   If a person communicates his or her healthcare wishes:   He or she will be given medical care that matches his or her values and goals.  Family members will not be forced to make decisions in a crisis with no guidance.  Creating a plan  Making an advance care plan is often done in 3 steps:   Thinking about one’s wishes. To create an advance care plan, think about what kind of medical treatment you would want if you lose the ability to communicate. Are there any situations in which you would refuse or stop treatment? Are there therapies you would want or not want? And whom do you want to make decisions for you? There are many places to learn more about how to plan for your care. Ask your healthcare provider or  for resources.  Picking a healthcare proxy. This means choosing a trusted person to speak for you only when you can’t speak for yourself. When you can't make medical decisions, your proxy makes sure the instructions in your advance care plan are followed. A proxy doesn't make decisions based on his  or her own opinions. They must put aside those opinions and values if needed, and carry out your wishes.  Filling out the legal documents. There are several kinds of legal documents for advance care planning. Each one tells healthcare providers your wishes. The documents may vary by state. They must be signed and may need to be witnessed or notarized. You can cancel or change them whenever you wish. Depending on your state, the documents may include a Healthcare Proxy form, Living Will, Durable Medical Power of , and Advance Directive.  The family’s role  The best help a family can give is to support their loved one’s wishes. Open and honest communication is vital. Family should express any concerns they have about the patient’s choices while the patient can still make decisions in the event that his or her illness prevents communicating those wishes at a later time.    [employer] last reviewed this educational content on 12/1/2019    © 6365-4557 [employer]. All rights reserved. This information is not intended as a substitute for professional medical care. Always follow your healthcare professional's instructions.          Advance Medical Directive  An advance medical directive is a form that lets you plan ahead for the care you’d want if you could no longer express your wishes. This statement outlines the medical treatment you’d want or names the person you’d wish to make healthcare decisions for you. Be aware that laws vary from state to state, and it may be worthwhile to talk with an .     Writing down your wishes  An advance directive is important whether you’re young or old. Injury or illness can strike at any age.  Decide what is important to you and the kind of treatment you’d want, or not want to have.  Some states allow only one kind of advance directive. Some let you do both a durable power of  for healthcare and a living will. Some states put both kinds on the same  form.    A durable power of  (POA) for healthcare   This form lets you name someone else that you have chosen and trust to speak and make decisions on your behalf.  This person can decide on treatment for you only when you can’t speak for yourself.  You don't need to be at the end of your life. They could speak for you if you were in a coma but were likely to recover.    A living will  This form lets you list the care you want at the end of your life.  A living will applies only if you won’t live without medical treatment. It would apply if you had advanced cancer, a massive stroke, or other serious illness from which you will not recover.  It takes effect only when you can no longer express your wishes yourself.    [employer] last reviewed this educational content on 2/1/2021 © 2000-2021 [employer]. All rights reserved. This information is not intended as a substitute for professional medical care. Always follow your healthcare professional's instructions.          Choosing an Agent  A durable power of  for healthcare is only as good as the person you name to be your agent. Your agent is the person you have chosen to speak and make decisions on your behalf. If this person knows your treatment wishes and is willing to carry them out, you’ll probably be well represented. Be sure to tell your agent what’s important to you.     Who to choose  Here are suggestions for choosing an agent:  You can name a family member, close friend, , , or rabbi.  You should name one person as your agent. Then name one or two alternates. You need a backup person in case your first choice can’t be reached when needed.  Talk with each person you're thinking of naming as your agent or alternate. Do this before you decide who should carry out your wishes.  Your agent should be ...  A competent adult, age 18 or older  Someone you trust and can talk to about the care you want and what's important to  you  Someone who supports your treatment choices    In many states, your agent can’t be ...   Your healthcare provider  An employee of your provider or of a hospital, nursing home, or hospice program where you receive care  Some states have other restrictions on who can be named as an agent for an advance directive.   Be prepared  Tip: It's a good idea to write down your wishes and give a copy to your agent and all others who are involved with your healthcare.   [employer] last reviewed this educational content on 8/1/2021 © 2000-2021 [employer]. All rights reserved. This information is not intended as a substitute for professional medical care. Always follow your healthcare professional's instructions.          Understanding DNR Orders  Do not resuscitate (DNR) orders tell hospital staff not to do potentially life-restoring measures, such as CPR, if you or your loved one's heart and lungs stop working. It allows a natural death, and prevents healthcare staff from artificially reviving a person and placing them on life support. In many states, a DNR order also applies to staff outside the hospital such as in nursing homes and emergency medical services. A DNR order must be written by a healthcare provider. Or in some cases, certain other healthcare workers write it. This can only be done with the person’s or family’s consent. If a person has not written an advance directive, their family will decide on a DNR with the help of the healthcare team.   The person can cancel a DNR order at any time. The healthcare team can answer questions about the DNR form. Have copies of a DNR form are readily available so that your wishes can be faithfully followed.   Writing a DNR order  When might a DNR order be written? When the person’s health condition is such that, in the case of cardiac arrest, CPR and other resuscitation methods are not desired. This could be because the chance of successful resuscitation is  very low. Or it could be because the care plan now focuses on comfort measures instead of life-sustaining measures. Coma and terminal illness are instances when a DNR order might be used.   Irreversible coma  In a coma, a person does not respond to sight, sound, or touch. The heart and lungs could be working, but brain function is damaged due to trauma or disease.   Terminal illness  In the last stages of heart disease, AIDS, cancer, and other illnesses, some people don’t want to prolong their suffering. If recovery isn’t likely and quality of life is poor or getting worse, a person or their family may agree to a DNR order.   DNR orders and hospice care  A hospice program can offer care during the final weeks of life. Hospice programs provide dignity, pain control and comfort care in the home or at special facilities. Hospice does not provide aggressive treatment. In fact, a DNR order will likely be discussed before a person is admitted to hospice. A  or  may be able to help you arrange for hospice support.   Documenting end-of-life wishes  In addition to DNR orders, a person with a serious, life-limiting illness may wish to document their treatment wishes. This is called a POST form or by different names, depending on the state. It's meant to provide a portable medical order to help guide healthcare providers on the specific medical treatments a person wants during a medical emergency. It's meant to complement a DNR order, not replace it. Your healthcare provider can tell you more and help you complete the forms. The form may be called one of these:   MOLST (medical orders for life-sustaining treatment)  POLST (physician orders for life-sustaining treatment)  MOST (medical orders for scope of treatment)  POST (physician orders for scope of treatment)  TPOPP (transportable physician orders for patient preferences)  Jaimee last reviewed this educational content on 7/1/2021    © 7111-8371  [employer]. All rights reserved. This information is not intended as a substitute for professional medical care. Always follow your healthcare professional's instructions.          An Agent’s Role for Durable Power of  for Health Care   It’s impossible to know which medical treatment choices you might face in the future. What if you aren't able to make these decisions for yourself? A durable power of  for healthcare lets you name an agent to speak and carry out your wishes on your behalf. This happens only if you can’t express your wishes yourself.     An agent’s duty  Your agent respects your wishes in the following ways:    Your agent’s duty is to see that your wishes are followed.  If your wishes aren’t known, your agent should try to decide what you want.  Your agent’s choices come before anyone else’s wishes for you.  A durable power of  for healthcare doesn't not give your agent control over your money . Your agent also can’t be made to pay your bills.  Find out what your agent can do  Restrictions on what an agent can and can’t do vary by state. Check your state laws. In most states your agent can:   Choose or refuse life-sustaining and other medical treatment on your behalf  Consent to treatment, and then stop treatment if your condition doesn’t improve  Access and release your medical records  Request an autopsy and donate your organs, unless you’ve stated otherwise in your advance directive  Find out whether your state allows your agent to do the following:   Refuse or withdraw life-enhancing care  Refuse or stop tube feeding or other life-sustaining care--even if you haven’t stated on your advance directive that you don’t want these treatments  Order sterilization or   [employer] last reviewed this educational content on 2021    © 1985-7274 [employer]. All rights reserved. This information is not intended as a substitute for professional  medical care. Always follow your healthcare professional's instructions.          Being a Healthcare Proxy  A healthcare proxy is someone who represents a person who can’t speak for themself. The name of this role varies by state. It may be called a Durable Medical Power of . It may be called a Durable Power of  for Healthcare. It may be called an agent, surrogate, or advocate. Or it may be called a representative or decision maker. It's an official duty that is noted by a legal document. The document also varies by state. The person must name you as his or her proxy on the document.      A healthcare proxy speaks for another person when he or she is not able to do so. The proxy helps make sure the person’s healthcare wishes are known and followed.     What it means to be a healthcare proxy   Your role as healthcare proxy starts when the person can’t make medical decisions. This assessment can only be made by a licensed doctor. You then make the healthcare decisions as needed. You do this by carrying out the person’s wishes. These wishes are noted in his or her advance care planning documents. These declare what kind of treatment the person wishes to have or not have. You may need to put aside your own values and opinions to carry out the person’s wishes. This may include refusing or stopping life-sustaining treatments.   Documenting end-of-life wishes   As a healthcare proxy, encourage the person to discuss his or her wishes, while they are able. They can do this with their healthcare provider and then document the wishes as a medical order. The provider can help the person complete the form. The forms are known by different names depending on the state. The form may be called one of these:     MOLST (medical orders for life-sustaining treatment)  POLST (physician orders for life-sustaining treatment)  MOST (medical orders for scope of treatment)  POST (physician orders for scope of  treatment)  TPOPP (transportable physician orders for patient preferences)    The form documents the person’s wishes at the end of life. It's not tied to a certain healthcare provider or facility. It's different than a living will. The form is an order written according to state regulations by a healthcare provider. To complete one, the person must express his or her wishes to an advanced healthcare provider. If the person can’t make his or her own decisions, then this is done by the person’s healthcare proxy.   Carrying out your role  Your duties depend on what the person’s advance care planning documents say. Your duties may also depend on state law. In general:   Before accepting a role as a proxy, talk with the person. Be sure you know his or her wishes. Ask questions. This will help you be his or her voice if and when it is needed.  Be sure that the person’s healthcare team knows that you are his or her proxy. Carry a copy of the document and proof of your identity.  Make sure the healthcare team has a copy of the advance care planning documents.  Talk to the healthcare team. Ask questions as often as you need. Stay informed about the person’s condition.  Ask for any help you need to understand the medical situation. Ask about the person’s condition and prognosis. Ask about risks and benefits of tests and treatments. Find out all the facts and options.  Speak on the person’s behalf with the healthcare team when needed.  Talk with family members and keep them informed.  Know your rights. You have the right to ask for information. You can ask for consultations and second opinions. You have the right to request or refuse treatment for the person. You may be able to review his or her medical chart. You can authorize the person’s transfer to another facility. You can also request a new healthcare provider for him or her. If you are not sure what your rights are at any time, ask a .  When it’s time to  make decisions  If the person’s wishes are clear in the advance care plan documents, ask for them to be carried out as noted. If they are not clear, talk with the healthcare team. Listen to the team’s recommendations. Talk with a  or counselor. It may be hard for you to make a decision at times. You may feel sad or upset about a decision. Being a healthcare proxy is not an easy role. But it's an important one. Remember that the person trusts you to carry out his or her wishes.   If you need help  Ask the healthcare team if you have trouble with a decision. The healthcare team will help you.  Encourage the person you are helping to have a conversation with their provider about their end-of-life wishes. The provider can help them fill out the form.  You may need help in resolving family conflicts. Ask the hospital or clinic , ethics consultant, or a  for help.  If you are having trouble talking with the healthcare team while the person is in the hospital, reach out to the patient relations department. Or ask to speak to the hospital ombudsman or ethics committee.    Jaimee last reviewed this educational content on 9/1/2019 © 2000-2021 [employer]. All rights reserved. This information is not intended as a substitute for professional medical care. Always follow your healthcare professional's instructions.          Life Support  If you understand how specific treatments may affect your quality of life, you can decide which ones you’d choose or refuse. You may want to talk to your healthcare provider about the possible benefits and risks of treatments. The chance of good results from these therapies varies based on each individual clinical situation and can be very hard to predict. Medical treatment, if your life is in danger, falls into 3 main categories.     Life supporting  This care keeps your heart and lungs going when they can no longer work on their  own.  CPR restarts your heart and lungs if they stop working.  A respirator (or ventilator) keeps you breathing. Air is pumped into your lungs through a tube that’s put into your windpipe.    Life sustaining  This care keeps you alive longer when you have an illness that can’t be cured.  Tube feeding or TPN (total parenteral nutrition) provides food and fluids through a tube or IV (intravenous). It is given if you can’t chew or swallow on your own.  Dialysis is a kidney machine that cleans your blood when your kidneys can no longer work on their own.    Life enhancing  This care controls pain and discomfort, such as nausea or trouble breathing. This type of care is not designed to prolong your life, but to enhance comfort and quality of life. Nothing is done to keep you alive longer.  Hospice care is comfort care. It might provide food and fluids by mouth or help with bathing. Hospice care is given during the last stages of a terminal illness.  Strong pain medicine can be given to help keep you comfortable.    Do Not Resuscitate (DNR)  Would you want CPR if your heart stops while you’re a patient in a hospital or nursing home? If not, talk to your healthcare provider about issuing a DNR (Do-Not-Resuscitate) order.   DNRs and advance directives may not apply during anesthesia, in emergency rooms, or when emergency medical teams respond to a 911 call. Ask your healthcare provider how you can make sure your wishes will be followed. Also, a DNR will not prevent you from getting other kinds of needed medical care such as treatment for pain, or bleeding.   [employer] last reviewed this educational content on 8/1/2021 © 2000-2021 [employer]. All rights reserved. This information is not intended as a substitute for professional medical care. Always follow your healthcare professional's instructions.          Stopping Life-Sustaining Treatments  Certain treatments can help sustain life when you have a serious  illness. But as your illness progresses, there may come a time when these treatments are no longer a benefit. Decisions must then be made whether to continue or stop these treatments. This can be a hard task for you and your loved ones, but know that you’re not alone. Your healthcare provider and healthcare team will guide you through these treatment decisions. They will also help answer any questions you may have.     What are life-sustaining treatments?  Life-sustaining treatments help keep you alive if a vital body function fails. These treatments can include CPR and the use of machines to help with heart, lung, or kidney function. They can also include the use of tubes to deliver food, fluids, blood, and medicines to the body. Blood transfusions and antibiotics are also types of life-sustaining treatments.   What happens if life-sustaining treatments are continued?  These treatments can help extend your life. But they will not cure your illness. If you are near the end of your life, you may find it hard to handle the side effects and problems that can occur with these treatments. In this case, the treatments may be too much of a burden on your body. They may cause more harm than good. They may also disrupt the natural dying process and prolong suffering.   What happens if life-sustaining treatments are stopped?  If these treatments are stopped, the focus of treatment will shift to comfort care. This involves measures to control pain and other symptoms you may have. These measures are not meant to cure your illness or help you live longer. Instead, they are meant to improve your quality of life during the time you have left. If you are in the end stage of your illness, you may be referred to hospice by your healthcare provider. Hospice provides end-of-life care. This includes emotional, spiritual, and social support for you and your loved ones.   How do I decide whether to stop life-sustaining treatments?  Your  healthcare provider and healthcare team will talk with you about the specific treatments that are part of your care plan. If you want, you may include family and friends in these meetings. Here are some questions to think about or ask your healthcare team:   Is there is a chance that my illness will improve? Or will it continue to get worse?  What are my goals of care? Do I want to extend the time I have left? Or do I want to focus my care on comfort and managing symptoms?  How will stopping or continuing these treatments affect my health? Will they enhance my comfort and quality of life? Or will they cause more problems?  Consider your own values or sami. Also ask for advice from those who share your values.  How do I state my decisions about life-sustaining treatments?  Once you’ve made your decision to continue or stop specific treatments, you can tell your healthcare provider directly. It's best to also put your treatment wishes in writing with advance directives. These are legal forms related to healthcare decisions. Laws about advance directives vary from state to state. Ask your healthcare provider about what forms are needed to make sure your wishes will be followed. Some common forms include:   A durable power of  for healthcare or a healthcare proxy form.  This form lets you name a person to make treatment decisions for you when you can’t. This person is often called a healthcare proxy, medical or healthcare power of , or agent.  A living will.  This form tells others the kinds of treatment you want or don’t want if you become too ill or injured to speak for yourself.  Orders for life-sustaining treatments.  These are actual healthcare provider's orders that must be followed by other medical providers. The form belongs to you, not to the healthcare provider or hospital.). These are legal forms obtained from your healthcare provider or hospital that document your wishes. The forms are known  by different names depending on the state. Common names include:  MOLST (medical orders for life-sustaining treatment)  POLST (physician orders for life-sustaining treatment)  MOST (medical orders for scope of treatment)  POST (physician orders for scope of treatment)  TPOPP (transportable physician orders for patient preferences)     Keep in mind that you can change or cancel an advance directive at any time. Make it a practice to review your decisions each time there is a change in your health or goals of care. Also be sure to tell your healthcare proxy and loved ones of any changes in your decisions.   Deciding whether to stop life-sustaining treatments for a loved one   The decision to stop treatment ideally is made with the person’s consent. If the person is not capable of making decisions and has no advance directive, the decision falls to the person’s healthcare proxy or other adult. If you need to make a decision about stopping treatment for a loved one, start by talking to his or her healthcare provider. Review the goals of care and the benefits and burdens of specific treatments on your loved one’s health. Also think about your loved one’s wishes and values. If needed, seek advice from other healthcare team members, like a  or .   [employer] last reviewed this educational content on 12/1/2019    © 9063-4234 [employer]. All rights reserved. This information is not intended as a substitute for professional medical care. Always follow your healthcare professional's instructions.

## 2024-05-04 DIAGNOSIS — F03.90 DEMENTIA (HCC): ICD-10-CM

## 2024-05-04 DIAGNOSIS — R41.3 MEMORY LOSS: ICD-10-CM

## 2024-05-04 DIAGNOSIS — E03.9 HYPOTHYROIDISM: ICD-10-CM

## 2024-05-06 RX ORDER — OMEPRAZOLE 20 MG/1
20 CAPSULE, DELAYED RELEASE ORAL DAILY
Qty: 90 CAPSULE | Refills: 3 | Status: SHIPPED | OUTPATIENT
Start: 2024-05-06

## 2024-05-06 NOTE — TELEPHONE ENCOUNTER
Refill passed per protocol.    Requested Prescriptions   Pending Prescriptions Disp Refills    OMEPRAZOLE 20 MG Oral Capsule Delayed Release [Pharmacy Med Name: OMEPRAZOLE DR 20 MG CAPSULE] 90 capsule 3     Sig: TAKE 1 CAPSULE BY MOUTH EVERY DAY       Gastrointestional Medication Protocol Passed - 5/4/2024 12:30 AM        Passed - In person appointment or virtual visit in the past 12 mos or appointment in next 3 mos     Recent Outpatient Visits              1 week ago Age-related nuclear cataract of both eyes    [facility] [place] [facility] Dacia Oseguera MD    Office Visit    2 weeks ago Decreased hearing of both ears    [facility] [place] [facility] Katalina Rojas MD    Office Visit    3 weeks ago Severe late onset Alzheimer's dementia without behavioral disturbance, psychotic disturbance, mood disturbance, or anxiety (HCC)    [facility] [place] [facility] Randy Pryor MD    Office Visit    2 months ago Chronic bilateral low back pain without sciatica    [facility] [place] [facility] Terrence Mcgregor DO    Office Visit    4 months ago     [facility] John Becker PT    Office Visit          Future Appointments         Provider Department Appt Notes    In 5 months Dacia Oseguera MD [facility] [place] [facility] 6 MONTH FU                         Future Appointments         Provider Department Appt Notes    In 5 months Dacia Oseguera MD [facility] [place] [facility] 6 MONTH FU          Recent Outpatient Visits              1 week ago Age-related nuclear cataract of both eyes    [facility] Dacia [facility]gu[facility]    Office Visit    2 weeks ago Decreased hearing of both ears    [facility] Katalina Rojas MD    Office Visit    3 weeks ago Severe  late onset Alzheimer's dementia without behavioral disturbance, psychotic disturbance, mood disturbance, or anxiety (HCC)    [facility] Randy Pryor MD    Office Visit    2 months ago Chronic bilateral low back pain without sciatica    [facility] [place] Terrence Ramos DO    Office Visit    4 months ago     [facility] John Becker, REA    Office Visit

## 2024-05-10 ENCOUNTER — LAB ENCOUNTER (OUTPATIENT)
Dept: LAB | Age: 89
End: 2024-05-10
Attending: INTERNAL MEDICINE

## 2024-05-10 DIAGNOSIS — N18.31 STAGE 3A CHRONIC KIDNEY DISEASE (HCC): Chronic | ICD-10-CM

## 2024-05-10 DIAGNOSIS — E78.2 MIXED HYPERLIPIDEMIA: ICD-10-CM

## 2024-05-10 DIAGNOSIS — E03.4 HYPOTHYROIDISM DUE TO ACQUIRED ATROPHY OF THYROID: ICD-10-CM

## 2024-05-10 DIAGNOSIS — I10 ESSENTIAL HYPERTENSION, BENIGN: ICD-10-CM

## 2024-05-10 LAB
ALBUMIN SERPL-MCNC: 3.7 G/DL (ref 3.2–4.8)
ALBUMIN/GLOB SERPL: 1.2 {RATIO} (ref 1–2)
ALP LIVER SERPL-CCNC: 73 U/L
ALT SERPL-CCNC: 20 U/L
ANION GAP SERPL CALC-SCNC: 7 MMOL/L (ref 0–18)
AST SERPL-CCNC: 16 U/L (ref ?–34)
BILIRUB SERPL-MCNC: 0.5 MG/DL (ref 0.2–0.9)
BUN BLD-MCNC: 18 MG/DL (ref 9–23)
BUN/CREAT SERPL: 13.6 (ref 10–20)
CALCIUM BLD-MCNC: 8.9 MG/DL (ref 8.7–10.4)
CHLORIDE SERPL-SCNC: 108 MMOL/L (ref 98–112)
CO2 SERPL-SCNC: 28 MMOL/L (ref 21–32)
CREAT BLD-MCNC: 1.32 MG/DL
EGFRCR SERPLBLD CKD-EPI 2021: 50 ML/MIN/1.73M2 (ref 60–?)
FASTING STATUS PATIENT QL REPORTED: NO
GLOBULIN PLAS-MCNC: 3 G/DL (ref 2–3.5)
GLUCOSE BLD-MCNC: 119 MG/DL (ref 70–99)
OSMOLALITY SERPL CALC.SUM OF ELEC: 299 MOSM/KG (ref 275–295)
POTASSIUM SERPL-SCNC: 4 MMOL/L (ref 3.5–5.1)
PROT SERPL-MCNC: 6.7 G/DL (ref 5.7–8.2)
SODIUM SERPL-SCNC: 143 MMOL/L (ref 136–145)
T4 FREE SERPL-MCNC: 1 NG/DL (ref 0.8–1.7)
TSI SER-ACNC: 3.95 MIU/ML (ref 0.55–4.78)

## 2024-05-10 PROCEDURE — 36415 COLL VENOUS BLD VENIPUNCTURE: CPT

## 2024-05-10 PROCEDURE — 84439 ASSAY OF FREE THYROXINE: CPT

## 2024-05-10 PROCEDURE — 84443 ASSAY THYROID STIM HORMONE: CPT

## 2024-05-10 PROCEDURE — 80053 COMPREHEN METABOLIC PANEL: CPT

## 2024-05-12 NOTE — PROGRESS NOTES
CMP Normal (electrolytes and liver functions), decline in kidney function from prior. No motrin, ibuprofen, advil, alleve, naprosyn  with these medications.  Hydrate at least 48 ounces of water a day.  Recheck kidney function in 1 week.  Orders written for nonfasting labs 1 week.  Thyroid is good.

## 2024-05-13 ENCOUNTER — TELEPHONE (OUTPATIENT)
Facility: CLINIC | Age: 89
End: 2024-05-13

## 2024-05-13 NOTE — TELEPHONE ENCOUNTER
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 She is wondering if patient should start the gabapentin, he has not started it yet- as well as how to wean patient off the Tramadol; if patient can take gabapentin while weaning off the Tramadol. I made her aware I will convey the above to Dr. Oseguera. She states it is ok to send a [employer] message as she checks patient's MyChart frequently. She verbalized understanding. No further questions or concerns at this time.    Dr. Oseguera to advise

## 2024-05-14 NOTE — TELEPHONE ENCOUNTER
Would recommend starting with 100 mg of gabapentin at night and could increase to 201-week if still pain not controlled and then 300 after 1 week.  Can decrease the tramadol after 1 week to every other night and then off.

## 2024-05-14 NOTE — TELEPHONE ENCOUNTER
Last read by Audie Houston at  1:25 PM on 5/14/2024.   Patient saw [employer] message sent--->see above

## 2024-05-17 ENCOUNTER — LAB ENCOUNTER (OUTPATIENT)
Dept: LAB | Age: 89
End: 2024-05-17
Attending: INTERNAL MEDICINE

## 2024-05-17 DIAGNOSIS — N28.9 RENAL INSUFFICIENCY: ICD-10-CM

## 2024-05-17 LAB
ANION GAP SERPL CALC-SCNC: 4 MMOL/L (ref 0–18)
BUN BLD-MCNC: 16 MG/DL (ref 9–23)
BUN/CREAT SERPL: 12.6 (ref 10–20)
CALCIUM BLD-MCNC: 9.1 MG/DL (ref 8.7–10.4)
CHLORIDE SERPL-SCNC: 109 MMOL/L (ref 98–112)
CO2 SERPL-SCNC: 29 MMOL/L (ref 21–32)
CREAT BLD-MCNC: 1.27 MG/DL
EGFRCR SERPLBLD CKD-EPI 2021: 53 ML/MIN/1.73M2 (ref 60–?)
FASTING STATUS PATIENT QL REPORTED: NO
GLUCOSE BLD-MCNC: 107 MG/DL (ref 70–99)
OSMOLALITY SERPL CALC.SUM OF ELEC: 296 MOSM/KG (ref 275–295)
POTASSIUM SERPL-SCNC: 4.5 MMOL/L (ref 3.5–5.1)
SODIUM SERPL-SCNC: 142 MMOL/L (ref 136–145)

## 2024-05-17 PROCEDURE — 36415 COLL VENOUS BLD VENIPUNCTURE: CPT

## 2024-05-17 PROCEDURE — 80048 BASIC METABOLIC PNL TOTAL CA: CPT

## 2024-05-20 NOTE — PROGRESS NOTES
BMP Normal (electrolytes), decline in kidney function is slightly better but still declined. No motrin, ibuprofen, advil, alleve, naprosyn  with these medications.  Will check a kidney ultrasound.  Orders written.

## 2024-05-28 RX ORDER — LEVOCETIRIZINE DIHYDROCHLORIDE 5 MG/1
5 TABLET, FILM COATED ORAL EVERY EVENING
Qty: 90 TABLET | Refills: 3 | Status: SHIPPED | OUTPATIENT
Start: 2024-05-28

## 2024-06-03 ENCOUNTER — HOSPITAL ENCOUNTER (OUTPATIENT)
Dept: ULTRASOUND IMAGING | Facility: HOSPITAL | Age: 89
Discharge: HOME | End: 2024-06-03
Attending: INTERNAL MEDICINE
Payer: MEDICARE

## 2024-06-03 DIAGNOSIS — N28.9 RENAL INSUFFICIENCY: ICD-10-CM

## 2024-06-03 PROCEDURE — 76770 US EXAM ABDO BACK WALL COMP: CPT | Performed by: INTERNAL MEDICINE

## 2024-06-21 ENCOUNTER — TELEPHONE (OUTPATIENT)
Dept: INTERNAL MEDICINE CLINIC | Facility: CLINIC | Age: 89
End: 2024-06-21

## 2024-06-21 NOTE — TELEPHONE ENCOUNTER
Unable to reach patient for medication adherence consult via  ID# [id number]. LVM for patient to call me back at [phone].

## 2024-07-01 ENCOUNTER — OFFICE VISIT (OUTPATIENT)
Dept: SURGERY | Facility: CLINIC | Age: 89
End: 2024-07-01
Payer: COMMERCIAL

## 2024-07-01 ENCOUNTER — LAB ENCOUNTER (OUTPATIENT)
Dept: LAB | Facility: HOSPITAL | Age: 89
End: 2024-07-01
Attending: UROLOGY
Payer: MEDICARE

## 2024-07-01 DIAGNOSIS — R39.15 URINARY URGENCY: ICD-10-CM

## 2024-07-01 DIAGNOSIS — R39.15 URINARY URGENCY: Primary | ICD-10-CM

## 2024-07-01 LAB — PSA SERPL-MCNC: 3.3 NG/ML (ref ?–4)

## 2024-07-01 PROCEDURE — 1159F MED LIST DOCD IN RCRD: CPT | Performed by: UROLOGY

## 2024-07-01 PROCEDURE — 36415 COLL VENOUS BLD VENIPUNCTURE: CPT

## 2024-07-01 PROCEDURE — 99204 OFFICE O/P NEW MOD 45 MIN: CPT | Performed by: UROLOGY

## 2024-07-01 PROCEDURE — 84153 ASSAY OF PSA TOTAL: CPT

## 2024-07-01 RX ORDER — MIRABEGRON 50 MG/1
50 TABLET, EXTENDED RELEASE ORAL DAILY
Qty: 100 TABLET | Refills: 3 | Status: SHIPPED | OUTPATIENT
Start: 2024-07-01 | End: 2025-06-26

## 2024-07-01 NOTE — PROGRESS NOTES
SUBJECTIVE:  Audie Houston is a 89 year old male who presents for a consultation at the request of, and a copy of this note will be sent to, Dacia Schulte, for evaluation of  incontinence and urinary frquency. He states that the problem is unchanged. Symptoms include referred for urinary incontinence, frequency, nocturnal enuresis.  Here today with his daughter.  Has a history of Alzheimer's dementia.  Uses 2 depends for urinary incontinence.  I seen the patient in 2021 for BPH and lower urinary tract symptoms.  He is on maximal medical therapy with tamsulosin and finasteride.  Recent renal bladder ultrasound Ronda 3, 2024 demonstrates no hydronephrosis, bladder volume of 240 cc.  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  No constipation.  No excessive caffeine or alcohol intake.. He denies any other complaints.  Urinalysis on dipstick April 29, 2024 ordered by his primary care physician normal.  Allergies:   Allergies   Allergen Reactions    Pcn [Penicillins] NAUSEA AND VOMITING    Seasonal OTHER (SEE COMMENTS)     Watery eyes, coughing, sneezing         History:  Past Medical History:    Acid reflux    Allergic rhinitis    Anxiety    Arthritis    Cataract    OU    Dementia (HCC)    Depression    Dermatochalasis of both eyelids    OU    Elevated PSA    Negative biopsies    Elevated PSA    and free PSA at 19%-Negative biopsies    Essential hypertension    Floaters    OD    Foot drop    w/ weakness    Obesity    Other and unspecified hyperlipidemia    Pinguecula of both eyes    OU    Stomach ulcer    per: NG-bleeding    Traumatic brain injury (HCC)      Past Surgical History:   Procedure Laterality Date    Back surgery      2 back surgeries    Colonoscopy  2000,2007    per: NG    Colonoscopy      Other surgical history  2000, 2003    x2 negative prostate biopsies      Family History   Problem Relation Age of Onset    Cancer Sister         metastatic cancer (brain)    Heart  Disease Father 84        CAD-(cause of death)    Cancer Sister 37        bone cancer (cause of death)    Other (Other) Other         No vascular disease    Asthma Daughter     Obesity Daughter     Glaucoma Neg     Diabetes Neg     Macular degeneration Neg       Social History:   Social History     Socioeconomic History    Marital status:    Tobacco Use    Smoking status: Former     Current packs/day: 0.00     Types: Cigarettes     Quit date: 1955     Years since quittin.5    Smokeless tobacco: Never   Vaping Use    Vaping status: Never Used   Substance and Sexual Activity    Alcohol use: No    Drug use: No   Other Topics Concern    Caffeine Concern Yes     Comment: decaf coffee,tea-1 cup/day    Exercise No    Reaction to local anesthetic No   Social History Narrative    The patient uses the following assistive device(s):  single-point cane.      The patient does live in a home with stairs.            REVIEW OF SYSTEMS:  RESPIRATORY:  Negative for chest tightness, wheezing, cough, shortness of breath,  sputum production,chest pain or pleurisy.  CARDIOVASCULAR:  Negative for pain or chest discomfort, dizziness or fainting, palpitations, leg swelling, nocturia, or claudication.  GASTROINTESTINAL:  Negative for nausea, vomiting, diarrhea, constipation, heartburn or indigestion, abdominal pains, bloody or tarry stools.  GENERAL: Denies:  weight gain, weight loss, fever, night sweats, bone pain, malaise, and fatigue. Positive for:  none.  All other review of systems reviewed and otherwise negative    OBJECTIVE:  There were no vitals taken for this visit.  He appears well, in no apparent distress.  Alert and oriented times three, pleasant and cooperative.  CARDIOVASCULAR:normal apical impulse  RESPIRATORY: no chest wall deformities or tenderness  ABDOMEN: abdomen is soft without significant tenderness, masses, organomegaly or guarding  GENITOURINARY:      Penis: no penile lesions or discharge. Meatus normal  location and size.      Scrotum: normal in appearance      Right Epididymis and Vas: both are palpably normal.      Right Testis: normal        Left Epididymis and Vas: both are palpably normal.      Left Testis: normal        Inguinal Lymph Nodes: non-palpable both      Prostate: refused.  Daughter states he would not allow MADAY      Exam grossly normal  Intact neurologically grossly  ASSESSMENT/PLAN  Encounter Diagnosis   Name Primary?    Urinary urgency Yes   Recommend:  - Continue on tamsulosin and finasteride.  - We will treat him empirically with mirabegron given normal bladder volumes and recent bladder ultrasound.  - Follow-up in 2 to 3 months to review results.    Orders Placed This Encounter   Procedures    PSA Diagnostic [E]       Meds This Visit:  Requested Prescriptions     Signed Prescriptions Disp Refills    Mirabegron ER 50 MG Oral Tablet 24 Hr 100 tablet 3     Sig: Take 1 tablet (50 mg total) by mouth daily.       Imaging & Referrals:  None

## 2024-07-07 DIAGNOSIS — E03.9 HYPOTHYROIDISM: ICD-10-CM

## 2024-07-07 DIAGNOSIS — R41.3 MEMORY LOSS: ICD-10-CM

## 2024-07-07 DIAGNOSIS — F03.90 DEMENTIA (HCC): ICD-10-CM

## 2024-07-08 DIAGNOSIS — G89.29 CHRONIC BILATERAL LOW BACK PAIN WITHOUT SCIATICA: ICD-10-CM

## 2024-07-08 DIAGNOSIS — M54.50 CHRONIC BILATERAL LOW BACK PAIN WITHOUT SCIATICA: ICD-10-CM

## 2024-07-08 RX ORDER — TRAMADOL HYDROCHLORIDE 50 MG/1
50 TABLET ORAL NIGHTLY PRN
Qty: 90 TABLET | Refills: 0 | Status: SHIPPED | OUTPATIENT
Start: 2024-07-08

## 2024-07-08 NOTE — TELEPHONE ENCOUNTER
Refill Request    Medication request: traMADol 50 MG Oral Tab     LOV:2/22/2024 Terrence Mcgregor,    Due back to clinic per last office note:  Follow up in 4-6 months or earlier PRN.   NOV: Visit date not found      [place]: 6/6/2024 #30 (30 days)    Urine drug screen (if applicable): none  Pain contract: none    LOV plan (if weaning or changing medications): Stop the tramadol, have him take gabapentin instead and see how does with it. If the gabapentin does not work we can transition back to the tramadol.     [facility] sent to schedule follow up appointment with Dr Mcgregor.

## 2024-07-10 RX ORDER — LOSARTAN POTASSIUM 25 MG/1
25 TABLET ORAL DAILY
Qty: 90 TABLET | Refills: 3 | Status: SHIPPED | OUTPATIENT
Start: 2024-07-10

## 2024-07-10 RX ORDER — TAMSULOSIN HYDROCHLORIDE 0.4 MG/1
0.4 CAPSULE ORAL DAILY
Qty: 90 CAPSULE | Refills: 3 | Status: SHIPPED | OUTPATIENT
Start: 2024-07-10

## 2024-07-10 NOTE — TELEPHONE ENCOUNTER
Refill passed per [facility] protocol.  Requested Prescriptions   Pending Prescriptions Disp Refills    LOSARTAN 25 MG Oral Tab [Pharmacy Med Name: LOSARTAN POTASSIUM 25 MG TAB] 90 tablet 3     Sig: Take 1 tablet (25 mg total) by mouth daily.       Hypertension Medications Protocol Passed - 7/7/2024 12:30 AM        Passed - CMP or BMP in past 12 months        Passed - Last BP reading less than 140/90     BP Readings from Last 1 Encounters:   04/29/24 120/64               Passed - In person appointment or virtual visit in the past 12 mos or appointment in next 3 mos     Recent Outpatient Visits              1 week ago Urinary urgency    [facility] Meir Houston MD    Office Visit    2 months ago Age-related nuclear cataract of both eyes    [facility] Dacia Oseguera MD    Office Visit    2 months ago Decreased hearing of both ears    [facility] Katalina Rojas MD    Office Visit    3 months ago Severe late onset Alzheimer's dementia without behavioral disturbance, psychotic disturbance, mood disturbance, or anxiety (HCC)    [facility] Randy Pryor MD    Office Visit    4 months ago Chronic bilateral low back pain without sciatica    [facility] Terrence Mcgregor DO    Office Visit          Future Appointments         Provider Department Appt Notes    In 3 months Dacia Oseguera MD [facility] 6 MONTH FU                    Passed - EGFRCR or GFRNAA > 50     GFR Evaluation  EGFRCR: 53 , resulted on 5/17/2024            TAMSULOSIN 0.4 MG Oral Cap [Pharmacy Med Name: TAMSULOSIN HCL 0.4 MG CAPSULE] 90 capsule 3     Sig: TAKE 1 CAPSULE BY MOUTH EVERY DAY       Genitourinary Medications Passed - 7/7/2024 12:30 AM        Passed - Patient does not have pulmonary  hypertension on problem list        Passed - In person appointment or virtual visit in the past 12 mos or appointment in next 3 mos     Recent Outpatient Visits              1 week ago Urinary urgency    [facility] Meir Nino MD    Office Visit    2 months ago Age-related nuclear cataract of both eyes    [facility]    2 months ago Decreased hearing of both ears    [facility] Katalina Viveros MD    Office Visit    3 months ago Severe late onset Alzheimer's dementia without behavioral disturbance, psychotic disturbance, mood disturbance, or anxiety ([facility])    [facility] Randy Jacobs MD    Office Visit    4 months ago Chronic bilateral low back pain without sciatica    [facility] [place]    [place] Visit          Future Appointments         Provider Department Appt Notes    In 3 months [facility]                       Recent Outpatient Visits              1 week ago Urinary urgency    [facility]    [facility]    2 months ago Age-related nuclear cataract of both eyes    [facility] Dacia Patel MD    Office Visit    2 months ago Decreased hearing of both ears    [facility], Katalina Vievros MD    Office Visit    3 months ago Severe late onset Alzheimer's dementia without behavioral disturbance, psychotic disturbance, mood disturbance, or anxiety ([facility])    [facility]    [facility] Visit    4 months ago Chronic bilateral low back pain without sciatica    [facility],  Emeka Aceves Henry, DO    Office Visit          Future Appointments         Provider Department Appt Notes    In 3 months Dacia Oseguera MD [facility] [address] [place] 6 MONTH FU

## 2024-07-10 NOTE — TELEPHONE ENCOUNTER
Refill passed per [facility] protocol.  Requested Prescriptions   Pending Prescriptions Disp Refills    LOSARTAN 25 MG Oral Tab [Pharmacy Med Name: LOSARTAN POTASSIUM 25 MG TAB] 90 tablet 3     Sig: Take 1 tablet (25 mg total) by mouth daily.       Hypertension Medications Protocol Passed - 7/7/2024 12:30 AM        Passed - CMP or BMP in past 12 months        Passed - Last BP reading less than 140/90     BP Readings from Last 1 Encounters:   04/29/24 120/64               Passed - In person appointment or virtual visit in the past 12 mos or appointment in next 3 mos     Recent Outpatient Visits              1 week ago Urinary urgency    [facility] Meir Houston MD    Office Visit    2 months ago Age-related nuclear cataract of both eyes    [facility] Dacia Oseguera MD    Office Visit    2 months ago Decreased hearing of both ears    [facility] Katalina Rojas MD    Office Visit    3 months ago Severe late onset Alzheimer's dementia without behavioral disturbance, psychotic disturbance, mood disturbance, or anxiety (HCC)    [facility] Randy Pryor MD    Office Visit    4 months ago Chronic bilateral low back pain without sciatica    [facility] Terrence Mcgregor DO    Office Visit          Future Appointments         Provider Department Appt Notes    In 3 months Dacia Oseguera MD [facility] 6 MONTH FU                    Passed - EGFRCR or GFRNAA > 50     GFR Evaluation  EGFRCR: 53 , resulted on 5/17/2024            TAMSULOSIN 0.4 MG Oral Cap [Pharmacy Med Name: TAMSULOSIN HCL 0.4 MG CAPSULE] 90 capsule 3     Sig: TAKE 1 CAPSULE BY MOUTH EVERY DAY       Genitourinary Medications Passed - 7/7/2024 12:30 AM        Passed - Patient does not have pulmonary  hypertension on problem list        Passed - In person appointment or virtual visit in the past 12 mos or appointment in next 3 mos     Recent Outpatient Visits              1 week ago Urinary urgency    [facility] Meir Nino MD    Office Visit    2 months ago Age-related nuclear cataract of both eyes    [facility]    2 months ago Decreased hearing of both ears    [facility] Katalina Viveros MD    Office Visit    3 months ago Severe late onset Alzheimer's dementia without behavioral disturbance, psychotic disturbance, mood disturbance, or anxiety ([facility])    [facility], Randy Jacobs MD    Office Visit    4 months ago Chronic bilateral low back pain without sciatica    [facility], [place]    Office Visit          Future Appointments         Provider Department Appt Notes    In 3 months [facility]                       Recent Outpatient Visits              1 week ago [facility] urgency    [facility]    [facility] Visit    2 months ago Age-related nuclear cataract of both eyes    [facility] Dacia Patel MD    Office Visit    2 months ago Decreased hearing of both ears    [facility], Katalina Viveros MD    Office Visit    3 months ago Severe late onset Alzheimer's dementia without behavioral disturbance, psychotic disturbance, mood disturbance, or anxiety ([facility])    [facility]    [facility]    4 months ago Chronic bilateral low back pain without sciatica    [facility],  Emeka Aceves Henry, DO    Office Visit          Future Appointments         Provider Department Appt Notes    In 3 months Dacia Oseguera MD [facility], [address], [place] 6 MONTH FU

## 2024-07-25 ENCOUNTER — NURSE TRIAGE (OUTPATIENT)
Dept: INTERNAL MEDICINE CLINIC | Facility: CLINIC | Age: 89
End: 2024-07-25

## 2024-07-25 LAB — AMB EXT COVID-19 RESULT: DETECTED

## 2024-07-25 NOTE — TELEPHONE ENCOUNTER
Please reply to pool: EM RN TRIAGE  Action Requested: Summary for Provider     []  Critical Lab, Recommendations Needed  [] Need Additional Advice  [x]   FYI    []   Need Orders  [] Need Medications Sent to Pharmacy  []  Other     SUMMARY: Patient's daughter contacts clinic reporting positive covid test.  Developed head and chest congestion this week.  Denies chest pain, shortness of breath, fever or chills.  Inquires on paxlovid prescription.  Nurse advised immediate care evaluation, no acute visits to offer.  High risk due to age and co morbidities.  Mask advised and to notify staff upon arrival.     Reason for call: Covid  Onset: Data Unavailable                       Reason for Disposition   HIGH RISK patient (e.g., weak immune system, age > 64 years, obesity with BMI of 30 or higher, pregnant, chronic lung disease or other chronic medical condition) and COVID symptoms (e.g., cough, fever)  (Exceptions: Already seen by doctor or NP/PA and no new or worsening symptoms.)    Protocols used: Coronavirus (COVID-19) Diagnosed or [id number]

## 2024-07-30 ENCOUNTER — TELEPHONE (OUTPATIENT)
Dept: INTERNAL MEDICINE CLINIC | Facility: CLINIC | Age: 89
End: 2024-07-30

## 2024-07-30 DIAGNOSIS — R05.1 ACUTE COUGH: Primary | ICD-10-CM

## 2024-07-30 NOTE — TELEPHONE ENCOUNTER
Dr. Oseguera- main concern is that patient has dementia and continues to have a productive cough that he \"chokes\" on his phlegm. Was not discharged home with any medications. Please advise. Thank you    Patient was admitted for Covid on 7/25/24 at [place] and was discharged 7/28.    Patient's daughter has concerns as patient has a productive cough that is worse at night and starts to \"choke\" on his phlegm. Continues to have body aches. Has dementia. The whole family has Covid from patient's caregiver.    Denies fever, shortness of breath, difficulty breathing, headaches    *Per hospital notes was receiving while in patient:    **benzonatate (TESSALON) capsule 100 mg  100 mg, oral, 3 times daily PRN, Starting on Fri 7/26/24 at 1253, Until Discontinued, cough, Swallow capsule whole. Do not break, chew, crush or dissolve. Do not crush or chew.     **Clindamycin 150 mg- 2 caps by mouth three times a day

## 2024-07-31 RX ORDER — MONTELUKAST SODIUM 10 MG/1
10 TABLET ORAL NIGHTLY
Qty: 7 TABLET | Refills: 0 | Status: SHIPPED | OUTPATIENT
Start: 2024-07-31 | End: 2024-08-07

## 2024-07-31 NOTE — TELEPHONE ENCOUNTER
Spoke to Nereida, daughter, on release, verified name and date of birth.     She was following up on message for cough medication. RN relayed message was sent to provider yesterday but have not received a response.

## 2024-07-31 NOTE — TELEPHONE ENCOUNTER
Nereida notified of below  Patient already taking montelukast 10 MG daily - sent to pharmacy in April 2024    Patient had xray and CT done in ER at [facility] - results available in CareEverywhere - do you want to have patient repeat chest xray? Nereida would like to confirm prior to taking him out of house to do this.     She is agreeable to have him try mucinex and other recommendations made.     Please review and advise.

## 2024-07-31 NOTE — TELEPHONE ENCOUNTER
Called patient's daughter Nereida,verified  patient name and date of birth.  Reviewed Dr Oseguera's recommendations from her 7/31/24 note below  with daughter Nereida.   She verbalized understanding  and will call  back if symptoms worsen.

## 2024-07-31 NOTE — TELEPHONE ENCOUNTER
Keep head up when go to bed.  Do not lay flat.  Can do montelukast 10 mg at night that will dry up the secretions and help prevent the drainage in the throat.  Will order chest x-ray also.  Cough can last up to couple months after the infection has cleared up.  Therapeutic pulmonary was also good laying on the belly rather back.  To help open up the lungs.  Orders for x-ray placed orders for Singulair sent to the pharmacy.  Also stay away from cold liquids and dairy which can make mucus thicker and harder to get out.  And do Mucinex over-the-counter in addition to the regimen 1 tablet twice a day.

## 2024-07-31 NOTE — TELEPHONE ENCOUNTER
Can try Claritin 10 mg a day in addition that will help dry up secretions in addition to the montelukast which is also a different type of allergy medication different mechanism of action.  And that is available over-the-counter.  Then do not need to repeat x-rays or any other imaging now.

## 2024-08-08 RX ORDER — FLUTICASONE PROPIONATE 50 MCG
2 SPRAY, SUSPENSION (ML) NASAL DAILY
Qty: 48 ML | Refills: 1 | Status: SHIPPED | OUTPATIENT
Start: 2024-08-08

## 2024-08-08 RX ORDER — FLUTICASONE PROPIONATE 50 MCG
2 SPRAY, SUSPENSION (ML) NASAL DAILY
Qty: 1 EACH | Refills: 3 | OUTPATIENT
Start: 2024-08-08

## 2024-08-08 NOTE — TELEPHONE ENCOUNTER
Refill passed per [facility] protocol.  Requested Prescriptions   Pending Prescriptions Disp Refills    FLUTICASONE PROPIONATE 50 MCG/ACT Nasal Suspension [Pharmacy Med Name: FLUTICASONE PROP 50 MCG SPRAY] 48 mL 1     Sig: SPRAY 2 SPRAYS INTO EACH NOSTRIL EVERY DAY       Allergy Medication Protocol Passed - 8/5/2024 12:05 AM        Passed - In person appointment or virtual visit in the past 12 mos or appointment in next 3 mos     Recent Outpatient Visits              1 month ago Urinary urgency    [facility]    Office Visit    3 months ago Age-related nuclear cataract of both eyes    [facility] Dacia Oseguera MD    Office Visit    3 months ago Decreased hearing of both ears    [facility] Katalina Rojas MD    Office Visit    4 months ago Severe late onset Alzheimer's dementia without behavioral disturbance, psychotic disturbance, mood disturbance, or anxiety (HCC)    [facility] Randy Pryor MD    Office Visit    5 months ago Chronic bilateral low back pain without sciatica    [facility] Terrence Mcgregor DO    Office Visit          Future Appointments         Provider Department Appt Notes    In 2 months Dacia Oseguera MD [facility] 6 MONTH FU                       Future Appointments         Provider Department Appt Notes    In 2 months Dacia Oseguera MD [facility] 6 MONTH FU          Recent Outpatient Visits              1 month ago Urinary urgency    [facility]    [facility] Visit    3 months ago Age-related nuclear cataract of both eyes    [facility] Dacia Oseguera MD    Office Visit     3 months ago Decreased hearing of both ears    [facility]    [facility]    4 months ago Severe late onset Alzheimer's dementia without behavioral disturbance, psychotic disturbance, mood disturbance, or anxiety ([facility])    [facility]    Office Visit    5 months ago Chronic bilateral low back pain without sciatica    [facility], Terrence Ramos DO    Office Visit

## 2024-08-14 DIAGNOSIS — F03.90 DEMENTIA (HCC): ICD-10-CM

## 2024-08-14 DIAGNOSIS — E03.9 HYPOTHYROIDISM: ICD-10-CM

## 2024-08-14 DIAGNOSIS — R41.3 MEMORY LOSS: ICD-10-CM

## 2024-08-18 RX ORDER — SIMVASTATIN 20 MG
20 TABLET ORAL NIGHTLY
Qty: 90 TABLET | Refills: 3 | Status: SHIPPED | OUTPATIENT
Start: 2024-08-18

## 2024-08-18 RX ORDER — GABAPENTIN 100 MG/1
300 CAPSULE ORAL NIGHTLY
Qty: 270 CAPSULE | Refills: 3 | Status: SHIPPED | OUTPATIENT
Start: 2024-08-18

## 2024-08-18 NOTE — TELEPHONE ENCOUNTER
Refill passed per [facility] protocol.    Requested Prescriptions   Pending Prescriptions Disp Refills    GABAPENTIN 100 MG Oral Cap [Pharmacy Med Name: GABAPENTIN 100 MG CAPSULE] 90 capsule 1     Sig: TAKE 3 CAPSULES BY MOUTH NIGHTLY       Neurology Medications Passed - 8/14/2024  2:40 PM        Passed - In person appointment or virtual visit in the past 6 mos or appointment in next 3 mos     Recent Outpatient Visits              1 month ago Urinary urgency    [facility] Meir Houston MD    Office Visit    3 months ago Age-related nuclear cataract of both eyes    [facility] Dacia Oseguera MD    Office Visit    4 months ago Decreased hearing of both ears    [facility] Katalina Rojas MD    Office Visit    4 months ago Severe late onset Alzheimer's dementia without behavioral disturbance, psychotic disturbance, mood disturbance, or anxiety (HCC)    [facility] Randy Pryor MD    Office Visit    5 months ago Chronic bilateral low back pain without sciatica    [facility] Terrence Mcgregor DO    Office Visit          Future Appointments         Provider Department Appt Notes    In 2 days Randy Pryor MD [facility] f/u, [place], policy informed, appt made by daughter    In 2 weeks Terrence Mcgregor DO [facility] back pain has increased    In 2 months Dacia Oseguera MD [facility] 6 MONTH FU                      SIMVASTATIN 20 MG Oral Tab [Pharmacy Med Name: SIMVASTATIN 20 MG TABLET] 90 tablet 3     Sig: TAKE 1 TABLET BY MOUTH EVERY DAY AT NIGHT       Cholesterol Medication Protocol Passed - 8/14/2024  2:40 PM        Passed - ALT < 80     Lab Results   Component  Value Date    ALT 20 05/10/2024             Passed - ALT resulted within past year        Passed - Lipid panel within past 12 months     Lab Results   Component Value Date    CHOLEST 117 03/29/2024    TRIG 146 03/29/2024    HDL 40 03/29/2024    LDL 52 03/29/2024    VLDL 21 03/29/2024    NONHDLC 77 03/29/2024             Passed - In person appointment or virtual visit in the past 12 mos or appointment in next 3 mos     Recent Outpatient Visits              1 month ago Urinary urgency    [facility]    [facility] Visit    3 months ago Age-related nuclear cataract of both eyes    [facility] Dacia Oseguera MD    Office Visit    4 months ago Decreased hearing of both ears    [facility] Katalina Rojas MD    Office Visit    4 months ago Severe late onset Alzheimer's dementia without behavioral disturbance, psychotic disturbance, mood disturbance, or anxiety (HCC)    [facility] Randy Pryor MD    Office Visit    5 months ago Chronic bilateral low back pain without sciatica    [facility] Terrence Mcgregor DO    Office Visit          Future Appointments         Provider Department Appt Notes    In 2 days Randy Pryor MD [facility] informed, appt made by daughter    In 2 weeks Terrence Mcgregor DO [facility] back pain has increased    In 2 months Dacia Oseguera MD [facility] 6 MONTH FU                         Recent Outpatient Visits              1 month ago Urinary urgency    [facility] Meir Houston MD    Office Visit    3 months ago Age-related nuclear cataract of both eyes    [facility] Dacia Oseguera MD    Office Visit    4 months ago Decreased hearing of both ears    [facility] Katalina Rojas MD    Office Visit    4 months ago Severe late onset Alzheimer's dementia without behavioral disturbance, psychotic disturbance, mood disturbance, or anxiety (HCC)    [facility] Randy Pryor MD    Office Visit    5 months ago Chronic bilateral low back pain without sciatica    [facility] Terrence Mcgregor DO    Office Visit            Future Appointments         Provider Department Appt Notes    In 2 days Randy Pryor MD [facility] f/u, [facility], policy informed, appt made by daughter    In 2 weeks Terrence Mcgregor DO [facility] back pain has increased    In 2 months Dacia Oseguera MD [facility] 6 MONTH FU

## 2024-08-22 ENCOUNTER — OFFICE VISIT (OUTPATIENT)
Dept: INTERNAL MEDICINE CLINIC | Facility: CLINIC | Age: 89
End: 2024-08-22
Payer: COMMERCIAL

## 2024-08-22 VITALS
HEART RATE: 77 BPM | SYSTOLIC BLOOD PRESSURE: 116 MMHG | BODY MASS INDEX: 32 KG/M2 | OXYGEN SATURATION: 96 % | RESPIRATION RATE: 18 BRPM | DIASTOLIC BLOOD PRESSURE: 60 MMHG | TEMPERATURE: 98 F | HEIGHT: 69 IN

## 2024-08-22 DIAGNOSIS — F02.C18 SEVERE LATE ONSET ALZHEIMER'S DEMENTIA WITH OTHER BEHAVIORAL DISTURBANCE (HCC): ICD-10-CM

## 2024-08-22 DIAGNOSIS — R05.2 SUBACUTE COUGH: ICD-10-CM

## 2024-08-22 DIAGNOSIS — R53.83 OTHER FATIGUE: ICD-10-CM

## 2024-08-22 DIAGNOSIS — Z86.16 HISTORY OF COVID-19: Primary | ICD-10-CM

## 2024-08-22 DIAGNOSIS — G30.1 SEVERE LATE ONSET ALZHEIMER'S DEMENTIA WITH OTHER BEHAVIORAL DISTURBANCE (HCC): ICD-10-CM

## 2024-08-22 PROCEDURE — 1159F MED LIST DOCD IN RCRD: CPT | Performed by: INTERNAL MEDICINE

## 2024-08-22 PROCEDURE — 3074F SYST BP LT 130 MM HG: CPT | Performed by: INTERNAL MEDICINE

## 2024-08-22 PROCEDURE — 99214 OFFICE O/P EST MOD 30 MIN: CPT | Performed by: INTERNAL MEDICINE

## 2024-08-22 PROCEDURE — 3078F DIAST BP <80 MM HG: CPT | Performed by: INTERNAL MEDICINE

## 2024-08-22 PROCEDURE — 1111F DSCHRG MED/CURRENT MED MERGE: CPT | Performed by: INTERNAL MEDICINE

## 2024-08-22 PROCEDURE — 1126F AMNT PAIN NOTED NONE PRSNT: CPT | Performed by: INTERNAL MEDICINE

## 2024-08-22 PROCEDURE — 1160F RVW MEDS BY RX/DR IN RCRD: CPT | Performed by: INTERNAL MEDICINE

## 2024-08-29 NOTE — PROGRESS NOTES
Subjective:     Patient ID: Audie Houston is a 89 year old male.    HPI    Patient here with daughter recently had COVID recovering but still with some cough daughter has noticed since he had COVID still been more more confused patient has advanced dementia    History/Other:   Review of Systems   Constitutional:  Positive for fatigue.   HENT: Negative.     Eyes: Negative.    Respiratory: Negative.     Cardiovascular: Negative.    Gastrointestinal: Negative.    Genitourinary: Negative.    Musculoskeletal: Negative.    Skin: Negative.    Neurological: Negative.    Psychiatric/Behavioral: Negative.       Current Outpatient Medications   Medication Sig Dispense Refill    gabapentin 100 MG Oral Cap Take 3 capsules (300 mg total) by mouth nightly. 270 capsule 3    simvastatin 20 MG Oral Tab Take 1 tablet (20 mg total) by mouth nightly. 90 tablet 3    fluticasone propionate 50 MCG/ACT Nasal Suspension 2 sprays by Nasal route daily. 48 mL 1    losartan 25 MG Oral Tab Take 1 tablet (25 mg total) by mouth daily. 90 tablet 3    tamsulosin 0.4 MG Oral Cap Take 1 capsule (0.4 mg total) by mouth daily. 90 capsule 3    traMADol 50 MG Oral Tab Take 1 tablet (50 mg total) by mouth nightly as needed for Pain. 90 tablet 0    Mirabegron ER 50 MG Oral Tablet 24 Hr Take 1 tablet (50 mg total) by mouth daily. 100 tablet 3    levocetirizine 5 MG Oral Tab Take 1 tablet (5 mg total) by mouth every evening. 90 tablet 3    omeprazole 20 MG Oral Capsule Delayed Release Take 1 capsule (20 mg total) by mouth daily. 90 capsule 3    finasteride 5 MG Oral Tab Take 1 tablet (5 mg total) by mouth daily. 90 tablet 3    Memantine HCl ER 28 MG Oral Capsule SR 24 Hr Take 1 capsule (28 mg total) by mouth daily.      QUEtiapine Fumarate 25 MG Oral Tab TAKE 1 TABLET BY MOUTH AT BEDTIME CAN GIVE ADDITIONAL TABLET AS NEEDED FOR AGITATION 180 tablet 0    aspirin 81 MG Oral Tab Take 1 tablet (81 mg total) by mouth daily.      acetaminophen (TYLENOL EXTRA  STRENGTH) 500 MG Oral Tab Take 1 tablet (500 mg total) by mouth every 6 (six) hours as needed for Pain.      Vitamin D3 (VITAMIN D3) 2000 UNITS Oral Cap Take 1 capsule (2,000 Units total) by mouth daily.      Vitamin B-12 (VITAMIN B12) 1000 MCG Oral Tab Take 1 tablet (1,000 mcg total) by mouth daily.      montelukast 10 MG Oral Tab Take 1 tablet (10 mg total) by mouth nightly. 90 tablet 3     Allergies:  Allergies   Allergen Reactions    Pcn [Penicillins] NAUSEA AND VOMITING    Seasonal OTHER (SEE COMMENTS)     Watery eyes, coughing, sneezing         Past Medical History:    Acid reflux    Allergic rhinitis    Anxiety    Arthritis    Cataract    OU    Dementia (HCC)    Depression    Dermatochalasis of both eyelids    OU    Elevated PSA    Negative biopsies    Elevated PSA    and free PSA at 19%-Negative biopsies    Essential hypertension    Floaters    OD    Foot drop    w/ weakness    Obesity    Other and unspecified hyperlipidemia    Pinguecula of both eyes    OU    Stomach ulcer    per: NG-bleeding    Traumatic brain injury (HCC)      Past Surgical History:   Procedure Laterality Date    Back surgery      2 back surgeries    Colonoscopy  ,    per: NG    Colonoscopy      Other surgical history  2000, 2003    x2 negative prostate biopsies      Family History   Problem Relation Age of Onset    Cancer Sister         metastatic cancer (brain)    Heart Disease Father 84        CAD-(cause of death)    Cancer Sister 37        bone cancer (cause of death)    Other (Other) Other         No vascular disease    Asthma Daughter     Obesity Daughter     Glaucoma Neg     Diabetes Neg     Macular degeneration Neg       Social History:   Social History     Socioeconomic History    Marital status:    Tobacco Use    Smoking status: Former     Current packs/day: 0.00     Types: Cigarettes     Quit date: 1955     Years since quittin.7    Smokeless tobacco: Never   Vaping Use    Vaping status: Never Used    Substance and Sexual Activity    Alcohol use: No    Drug use: No   Other Topics Concern    Caffeine Concern Yes     Comment: decaf coffee,tea-1 cup/day    Exercise No    Reaction to local anesthetic No   Social History Narrative    The patient uses the following assistive device(s):  single-point cane.      The patient does live in a home with stairs.        Objective:   Physical Exam  Vitals and nursing note reviewed.   Constitutional:       Appearance: He is well-developed.   HENT:      Head: Normocephalic and atraumatic.      Right Ear: External ear normal.      Left Ear: External ear normal.      Nose: Nose normal.   Eyes:      Conjunctiva/sclera: Conjunctivae normal.      Pupils: Pupils are equal, round, and reactive to light.   Cardiovascular:      Rate and Rhythm: Normal rate and regular rhythm.      Heart sounds: Normal heart sounds.   Pulmonary:      Effort: Pulmonary effort is normal.      Breath sounds: Normal breath sounds.   Abdominal:      General: Bowel sounds are normal.      Palpations: Abdomen is soft.   Genitourinary:     Penis: Normal.       Prostate: Normal.      Rectum: Normal.   Musculoskeletal:         General: Normal range of motion.      Cervical back: Normal range of motion and neck supple.   Skin:     General: Skin is warm and dry.   Neurological:      Mental Status: He is alert and oriented to person, place, and time.      Deep Tendon Reflexes: Reflexes are normal and symmetric.         Assessment & Plan:   1. History of COVID-19 recovering monitor symptoms   2. Severe late onset Alzheimer's dementia with other behavioral disturbance (HCC) chronic little worse since her COVID   3. Other fatigue due to above monitor   4. Subacute cough lungs clear monitor       No orders of the defined types were placed in this encounter.      Meds This Visit:  Requested Prescriptions      No prescriptions requested or ordered in this encounter       Imaging & Referrals:  None

## 2024-10-03 ENCOUNTER — HOSPITAL ENCOUNTER (OUTPATIENT)
Dept: GENERAL RADIOLOGY | Facility: HOSPITAL | Age: 89
Discharge: HOME | End: 2024-10-03
Attending: INTERNAL MEDICINE
Payer: MEDICARE

## 2024-10-03 ENCOUNTER — OFFICE VISIT (OUTPATIENT)
Dept: PHYSICAL MEDICINE AND REHAB | Facility: CLINIC | Age: 89
End: 2024-10-03
Payer: COMMERCIAL

## 2024-10-03 ENCOUNTER — HOSPITAL ENCOUNTER (OUTPATIENT)
Dept: GENERAL RADIOLOGY | Facility: HOSPITAL | Age: 89
Discharge: HOME | End: 2024-10-03
Attending: PHYSICAL MEDICINE & REHABILITATION
Payer: MEDICARE

## 2024-10-03 DIAGNOSIS — R05.1 ACUTE COUGH: ICD-10-CM

## 2024-10-03 DIAGNOSIS — M54.50 CHRONIC BILATERAL LOW BACK PAIN WITHOUT SCIATICA: Primary | ICD-10-CM

## 2024-10-03 DIAGNOSIS — R26.89 ABNORMALITY OF GAIT DUE TO IMPAIRMENT OF BALANCE: ICD-10-CM

## 2024-10-03 DIAGNOSIS — G89.29 CHRONIC BILATERAL LOW BACK PAIN WITHOUT SCIATICA: ICD-10-CM

## 2024-10-03 DIAGNOSIS — G89.29 CHRONIC BILATERAL LOW BACK PAIN WITHOUT SCIATICA: Primary | ICD-10-CM

## 2024-10-03 DIAGNOSIS — G30.9 SEVERE ALZHEIMER'S DEMENTIA WITH OTHER BEHAVIORAL DISTURBANCE, UNSPECIFIED TIMING OF DEMENTIA ONSET (HCC): ICD-10-CM

## 2024-10-03 DIAGNOSIS — M54.50 CHRONIC BILATERAL LOW BACK PAIN WITHOUT SCIATICA: ICD-10-CM

## 2024-10-03 DIAGNOSIS — F02.C18 SEVERE ALZHEIMER'S DEMENTIA WITH OTHER BEHAVIORAL DISTURBANCE, UNSPECIFIED TIMING OF DEMENTIA ONSET (HCC): ICD-10-CM

## 2024-10-03 PROCEDURE — 72110 X-RAY EXAM L-2 SPINE 4/>VWS: CPT | Performed by: PHYSICAL MEDICINE & REHABILITATION

## 2024-10-03 PROCEDURE — 1160F RVW MEDS BY RX/DR IN RCRD: CPT | Performed by: PHYSICAL MEDICINE & REHABILITATION

## 2024-10-03 PROCEDURE — 1159F MED LIST DOCD IN RCRD: CPT | Performed by: PHYSICAL MEDICINE & REHABILITATION

## 2024-10-03 PROCEDURE — 99214 OFFICE O/P EST MOD 30 MIN: CPT | Performed by: PHYSICAL MEDICINE & REHABILITATION

## 2024-10-03 PROCEDURE — 71046 X-RAY EXAM CHEST 2 VIEWS: CPT | Performed by: INTERNAL MEDICINE

## 2024-10-03 RX ORDER — TRAMADOL HYDROCHLORIDE 50 MG/1
75 TABLET ORAL EVERY EVENING
Qty: 45 TABLET | Refills: 0 | Status: SHIPPED | OUTPATIENT
Start: 2024-10-03

## 2024-10-03 NOTE — PROGRESS NOTES
Progress note    C/C:   Chief Complaint   Patient presents with    Follow - Up     LOV 2/22/24. Patient presents for f/u on low back. Pain 9/10. Denies wekaness. Denies N/T.      HPI: 89 year old male presents for follow up with his daughter. Significant low back pain particularly at night time. Was hospitalized in July for COVID was able to recover but has been weaker. Has had increasing memory issues. Complains of pain; \"feels as though his back is breaking.\" Sleep through the night.  Due to worsening of dementia he is unable to describe back pain, and unable to reliably state whether back pain radiates into the legs, or whether there is numbness or tingling. Walks with a rolling walker.     Pertinent allergies:   Allergies   Allergen Reactions    Pcn [Penicillins] NAUSEA AND VOMITING    Seasonal OTHER (SEE COMMENTS)     Watery eyes, coughing, sneezing          Physical exam:  There were no vitals taken for this visit.     He is able to transition from sit to stand by himself without assistance    Lumbar spine exam:    ROM deferred  Unable to safely ascend to the examination table; rest of exam done with patient sitting in chair  Grossly normal strength in the bilateral lower extremities.  Not able to follow instructions well enough through for manual testing  No significant tenderness to palpation lumbar spine  1/4 lower extremity reflexes bilaterally    Imaging: No new imaging to review    Assessment and plan  Acute on chronic low back pain  Alzheimer's dementia  CKD  Hypertension    Need to rule out compression fracture.  Recommend x-ray lumbar spine, home health PT. DME order for wheelchair placed for patient, for use attending medical appointments and when out in the community. May increase tramadol to 75mg at bedtime.     F/u in 4-6 weeks.     Terrence Mcgregor DO  Physical Medicine and Rehabilitation  [facility]

## 2024-10-28 RX ORDER — CHLORAL HYDRATE 500 MG
2 CAPSULE ORAL DAILY
COMMUNITY

## 2024-10-28 RX ORDER — DONEPEZIL HYDROCHLORIDE 10 MG/1
10 TABLET, FILM COATED ORAL NIGHTLY
COMMUNITY

## 2024-10-29 ENCOUNTER — OFFICE VISIT (OUTPATIENT)
Dept: INTERNAL MEDICINE CLINIC | Facility: CLINIC | Age: 89
End: 2024-10-29

## 2024-10-29 ENCOUNTER — TELEPHONE (OUTPATIENT)
Dept: INTERNAL MEDICINE CLINIC | Facility: CLINIC | Age: 89
End: 2024-10-29

## 2024-10-29 VITALS
HEART RATE: 65 BPM | TEMPERATURE: 98 F | BODY MASS INDEX: 31.7 KG/M2 | DIASTOLIC BLOOD PRESSURE: 76 MMHG | HEIGHT: 69 IN | WEIGHT: 214 LBS | SYSTOLIC BLOOD PRESSURE: 118 MMHG | OXYGEN SATURATION: 98 %

## 2024-10-29 DIAGNOSIS — E03.9 ACQUIRED HYPOTHYROIDISM: ICD-10-CM

## 2024-10-29 DIAGNOSIS — R31.21 ASYMPTOMATIC MICROSCOPIC HEMATURIA: ICD-10-CM

## 2024-10-29 DIAGNOSIS — I10 ESSENTIAL HYPERTENSION, BENIGN: ICD-10-CM

## 2024-10-29 DIAGNOSIS — R53.1 WEAKNESS: Primary | ICD-10-CM

## 2024-10-29 DIAGNOSIS — R53.1 WEAKNESS: ICD-10-CM

## 2024-10-29 DIAGNOSIS — Z71.85 VACCINE COUNSELING: ICD-10-CM

## 2024-10-29 DIAGNOSIS — N18.31 STAGE 3A CHRONIC KIDNEY DISEASE (HCC): Primary | Chronic | ICD-10-CM

## 2024-10-29 PROCEDURE — 3008F BODY MASS INDEX DOCD: CPT | Performed by: INTERNAL MEDICINE

## 2024-10-29 PROCEDURE — 99215 OFFICE O/P EST HI 40 MIN: CPT | Performed by: INTERNAL MEDICINE

## 2024-10-29 PROCEDURE — 1160F RVW MEDS BY RX/DR IN RCRD: CPT | Performed by: INTERNAL MEDICINE

## 2024-10-29 PROCEDURE — 1159F MED LIST DOCD IN RCRD: CPT | Performed by: INTERNAL MEDICINE

## 2024-10-29 PROCEDURE — 1126F AMNT PAIN NOTED NONE PRSNT: CPT | Performed by: INTERNAL MEDICINE

## 2024-10-29 PROCEDURE — 3074F SYST BP LT 130 MM HG: CPT | Performed by: INTERNAL MEDICINE

## 2024-10-29 PROCEDURE — 3078F DIAST BP <80 MM HG: CPT | Performed by: INTERNAL MEDICINE

## 2024-10-29 RX ORDER — MONTELUKAST SODIUM 10 MG/1
10 TABLET ORAL NIGHTLY
Qty: 4 TABLET | Refills: 0 | Status: SHIPPED | OUTPATIENT
Start: 2024-10-29 | End: 2024-11-02

## 2024-10-29 NOTE — PATIENT INSTRUCTIONS
ASSESSMENT/PLAN:     Encounter Diagnoses   Name Primary?    Stage 3a chronic kidney disease (HCC) No motrin, ibuprofen, advil, alleve, naprosyn  with these medications.     Yes    Vaccine counseling Up to date.        Essential hypertension, benign Stable. Careful with diet and excercise at least 30 minutes 3-4 times a week. Check blood pressures at different times on different days. Can purchase own blood pressure monitor. If not, check at local pharmacy. Bake foods more and grill occasionally. Avoid fried foods. No salt. Use other seasonings.         Acquired hypothyroidism Check blood.        Asymptomatic microscopic hematuria Check blood. Check urine.        Weakness Check blood. HH. Foldable WC.       Cough. Try singuilar 10 mg at night for 4 nights. Call if no better.     Orders Placed This Encounter   Procedures    TSH W Reflex To Free T4    Free T4, (Free Thyroxine)    Urinalysis, Routine    Vitamin B12    Vitamin D    Sed Rate, Westergren (Automated)    CBC With Differential With Platelet    PTT, Activated    Prothrombin Time (PT)    Urine Culture, Routine       Meds This Visit:  Requested Prescriptions     Signed Prescriptions Disp Refills    montelukast (SINGULAIR) 10 MG Oral Tab 4 tablet 0     Sig: Take 1 tablet (10 mg total) by mouth nightly for 4 days.       Imaging & Referrals:  [facility] REFERR[facility]  DME - EXTERNAL       RTC after 4-29-25 for physical.

## 2024-10-29 NOTE — PROGRESS NOTES
HPI:    Patient ID: Audie Houston is a 89 year old male.    Hypertension  Patient is here for follow up of hypertension. BP at home: not check.   Has been compliant with medications.  Exercise level: not active and has been following low salt diet.  Weight has been stable.  Wt Readings from Last 3 Encounters:   10/29/24 214 lb (97.1 kg)   04/29/24 214 lb 9.6 oz (97.3 kg)   04/18/24 219 lb (99.3 kg)     BP Readings from Last 3 Encounters:   10/29/24 118/76   08/22/24 116/60   04/29/24 120/64     Labs:   Lab Results   Component Value Date/Time     (H) 05/17/2024 11:22 AM     05/17/2024 11:22 AM    K 4.5 05/17/2024 11:22 AM     05/17/2024 11:22 AM    CO2 29.0 05/17/2024 11:22 AM    CREATSERUM 1.27 05/17/2024 11:22 AM    CA 9.1 05/17/2024 11:22 AM    AST 16 05/10/2024 10:47 AM    ALT 20 05/10/2024 10:47 AM    TSH 3.952 05/10/2024 10:47 AM    T4F 1.0 05/10/2024 10:47 AM        Lab Results   Component Value Date/Time    CHOLEST 117 03/29/2024 09:20 AM    HDL 40 03/29/2024 09:20 AM    TRIG 146 03/29/2024 09:20 AM    LDL 52 03/29/2024 09:20 AM    NONHDLC 77 03/29/2024 09:20 AM            Wt Readings from Last 3 Encounters:   10/29/24 214 lb (97.1 kg)   04/29/24 214 lb 9.6 oz (97.3 kg)   04/18/24 219 lb (99.3 kg)     BP Readings from Last 3 Encounters:   10/29/24 118/76   08/22/24 116/60   04/29/24 120/64     Labs:   Lab Results   Component Value Date/Time     (H) 05/17/2024 11:22 AM     05/17/2024 11:22 AM    K 4.5 05/17/2024 11:22 AM     05/17/2024 11:22 AM    CO2 29.0 05/17/2024 11:22 AM    CREATSERUM 1.27 05/17/2024 11:22 AM    CA 9.1 05/17/2024 11:22 AM    AST 16 05/10/2024 10:47 AM    ALT 20 05/10/2024 10:47 AM    TSH 3.952 05/10/2024 10:47 AM    T4F 1.0 05/10/2024 10:47 AM        Lab Results   Component Value Date/Time    CHOLEST 117 03/29/2024 09:20 AM    HDL 40 03/29/2024 09:20 AM    TRIG 146 03/29/2024 09:20 AM    LDL 52 03/29/2024 09:20 AM    NONHDLC 77 03/29/2024 09:20 AM           Covid 7-24. Hospitalized. DC but weak. Home PT and still uses walker. Exhausted a lot faster now.  Doing PT. Night mild coughing. Daughter found red blood in diaper in front. But rash that is scratching. Not sure if from that. NL BM's Denies BRBPR. Taking benefiber 1 at 2 times a day. Now, has bedrails just mid 1/3. Not choking. But spitting up a lot. No dysphagia. Memory is worse. Last PT this Friday, thus 9 sessions. Doing well.     Rash  Associated symptoms include coughing and fatigue. Pertinent negatives include no congestion, diarrhea, eye pain, fever, rhinorrhea, shortness of breath, sore throat or vomiting.         Review of Systems   Constitutional:  Positive for activity change and fatigue. Negative for appetite change, chills, diaphoresis, fever and unexpected weight change.   HENT:  Negative for congestion, dental problem, drooling, ear discharge, ear pain, facial swelling, hearing loss, mouth sores, nosebleeds, postnasal drip, rhinorrhea, sinus pressure, sinus pain, sneezing, sore throat, tinnitus, trouble swallowing and voice change.    Eyes:  Negative for photophobia, pain, discharge, redness, itching and visual disturbance.   Respiratory:  Positive for cough. Negative for apnea, choking, chest tightness, shortness of breath, wheezing and stridor.    Cardiovascular:  Negative for chest pain, palpitations and leg swelling.   Gastrointestinal:  Negative for diarrhea and vomiting.   Endocrine: Negative for cold intolerance, heat intolerance, polydipsia, polyphagia and polyuria.   Genitourinary:  Negative for frequency.   Musculoskeletal:  Negative for neck pain.   Skin:  Positive for rash.   Neurological:  Negative for dizziness, tremors, seizures, syncope, facial asymmetry, speech difficulty, weakness, light-headedness, numbness and headaches.   Psychiatric/Behavioral:  The patient is not nervous/anxious.    All other systems reviewed and are negative.        Current Outpatient Medications    Medication Sig Dispense Refill    montelukast (SINGULAIR) 10 MG Oral Tab Take 1 tablet (10 mg total) by mouth nightly for 4 days. 4 tablet 0    donepezil 10 MG Oral Tab Take 1 tablet (10 mg total) by mouth nightly.      omega-3 fatty acids 1000 MG Oral Cap Take 2,000 mg by mouth daily.      traMADol 50 MG Oral Tab Take 1.5 tablets (75 mg total) by mouth every evening. DOSE ADJUSTMENT 45 tablet 0    gabapentin 100 MG Oral Cap Take 3 capsules (300 mg total) by mouth nightly. 270 capsule 3    simvastatin 20 MG Oral Tab Take 1 tablet (20 mg total) by mouth nightly. 90 tablet 3    fluticasone propionate 50 MCG/ACT Nasal Suspension 2 sprays by Nasal route daily. 48 mL 1    losartan 25 MG Oral Tab Take 1 tablet (25 mg total) by mouth daily. 90 tablet 3    tamsulosin 0.4 MG Oral Cap Take 1 capsule (0.4 mg total) by mouth daily. 90 capsule 3    levocetirizine 5 MG Oral Tab Take 1 tablet (5 mg total) by mouth every evening. 90 tablet 3    omeprazole 20 MG Oral Capsule Delayed Release Take 1 capsule (20 mg total) by mouth daily. 90 capsule 3    finasteride 5 MG Oral Tab Take 1 tablet (5 mg total) by mouth daily. 90 tablet 3    Memantine HCl ER 28 MG Oral Capsule SR 24 Hr Take 1 capsule (28 mg total) by mouth daily.      QUEtiapine Fumarate 25 MG Oral Tab TAKE 1 TABLET BY MOUTH AT BEDTIME CAN GIVE ADDITIONAL TABLET AS NEEDED FOR AGITATION 180 tablet 0    aspirin 81 MG Oral Tab Take 1 tablet (81 mg total) by mouth daily.      acetaminophen (TYLENOL EXTRA STRENGTH) 500 MG Oral Tab Take 1 tablet (500 mg total) by mouth every 6 (six) hours as needed for Pain.      Vitamin D3 (VITAMIN D3) 2000 UNITS Oral Cap Take 1 capsule (2,000 Units total) by mouth daily.      Vitamin B-12 (VITAMIN B12) 1000 MCG Oral Tab Take 1 tablet (1,000 mcg total) by mouth daily.       Allergies:Allergies[1]    HISTORY:  Past Medical History:    Acid reflux    Allergic rhinitis    Anxiety    Arthritis    Cataract    OU    Dementia (HCC)    Depression     Dermatochalasis of both eyelids    OU    Elevated PSA    Negative biopsies    Elevated PSA    and free PSA at 19%-Negative biopsies    Essential hypertension    Floaters    OD    Foot drop    w/ weakness    Obesity    Other and unspecified hyperlipidemia    Pinguecula of both eyes    OU    Stomach ulcer    per: NG-bleeding    Traumatic brain injury (HCC)      Past Surgical History:   Procedure Laterality Date    Back surgery      2 back surgeries    Colonoscopy  ,    per: NG    Colonoscopy      Other surgical history  , 2003    x2 negative prostate biopsies      Family History   Problem Relation Age of Onset    Cancer Sister         metastatic cancer (brain)    Heart Disease Father 84        CAD-(cause of death)    Cancer Sister 37        bone cancer (cause of death)    Other (Other) Other         No vascular disease    Asthma Daughter     Obesity Daughter     Glaucoma Neg     Diabetes Neg     Macular degeneration Neg       Social History:   Social History     Socioeconomic History    Marital status:    Tobacco Use    Smoking status: Former     Current packs/day: 0.00     Types: Cigarettes     Quit date: 1955     Years since quittin.8    Smokeless tobacco: Never   Vaping Use    Vaping status: Never Used   Substance and Sexual Activity    Alcohol use: No    Drug use: No   Other Topics Concern    Caffeine Concern Yes     Comment: decaf coffee,tea-1 cup/day    Exercise No    Reaction to local anesthetic No   Social History Narrative    The patient uses the following assistive device(s):  single-point cane.      The patient does live in a home with stairs.        PHYSICAL EXAM:   /76 (BP Location: Left arm, Patient Position: Sitting, Cuff Size: adult)   Pulse 65   Temp 97.7 °F (36.5 °C) (Temporal)   Ht 5' 9\" (1.753 m)   Wt 214 lb (97.1 kg)   SpO2 98%   BMI 31.60 kg/m²   BP Readings from Last 3 Encounters:   10/29/24 118/76   24 116/60   24 120/64     Wt Readings from  Last 3 Encounters:   10/29/24 214 lb (97.1 kg)   04/29/24 214 lb 9.6 oz (97.3 kg)   04/18/24 219 lb (99.3 kg)       Physical Exam  Vitals and nursing note reviewed.   Constitutional:       General: He is not in acute distress.     Appearance: He is well-developed and well-groomed. He is not ill-appearing, toxic-appearing or diaphoretic.      Interventions: He is not intubated.  HENT:      Head:      Jaw: No trismus.      Right Ear: Tympanic membrane, ear canal and external ear normal. Decreased hearing noted. No laceration, drainage, swelling or tenderness. No middle ear effusion. There is no impacted cerumen. No foreign body. No mastoid tenderness. No PE tube. No hemotympanum. Tympanic membrane is not injected, scarred, perforated, erythematous, retracted or bulging. Tympanic membrane has normal mobility.      Left Ear: Tympanic membrane, ear canal and external ear normal. Decreased hearing noted. No laceration, drainage, swelling or tenderness.  No middle ear effusion. There is no impacted cerumen. No foreign body. No mastoid tenderness. No PE tube. No hemotympanum. Tympanic membrane is not injected, scarred, perforated, erythematous, retracted or bulging. Tympanic membrane has normal mobility.      Nose:      Right Sinus: No maxillary sinus tenderness or frontal sinus tenderness.      Left Sinus: No maxillary sinus tenderness or frontal sinus tenderness.      Mouth/Throat:      Lips: Pink. No lesions.      Mouth: Mucous membranes are moist. Mucous membranes are not pale, not dry and not cyanotic. No injury, lacerations, oral lesions or angioedema.      Dentition: No dental tenderness, gingival swelling, dental abscesses or gum lesions.      Tongue: No lesions. Tongue does not deviate from midline.      Palate: No mass and lesions.      Pharynx: Oropharynx is clear. Uvula midline. Postnasal drip present. No pharyngeal swelling, oropharyngeal exudate, posterior oropharyngeal erythema or uvula swelling.       Tonsils: No tonsillar exudate or tonsillar abscesses.   Eyes:      General: No scleral icterus.        Right eye: No discharge.         Left eye: No discharge.   Neck:      Thyroid: No thyromegaly.      Vascular: No JVD.      Trachea: No tracheal deviation.   Cardiovascular:      Rate and Rhythm: Normal rate and regular rhythm.      Pulses: Normal pulses.           Carotid pulses are 2+ on the right side and 2+ on the left side.       Radial pulses are 2+ on the right side and 2+ on the left side.        Dorsalis pedis pulses are 2+ on the right side and 2+ on the left side.        Posterior tibial pulses are 2+ on the right side and 2+ on the left side.      Heart sounds: Normal heart sounds, S1 normal and S2 normal.   Pulmonary:      Effort: Pulmonary effort is normal. No tachypnea, bradypnea, accessory muscle usage, prolonged expiration, respiratory distress or retractions. He is not intubated.      Breath sounds: Normal breath sounds. No stridor, decreased air movement or transmitted upper airway sounds. No decreased breath sounds, wheezing, rhonchi or rales.   Chest:      Chest wall: No tenderness.   Abdominal:      General: Bowel sounds are normal.      Palpations: Abdomen is soft.      Tenderness: There is no abdominal tenderness.   Musculoskeletal:      Right lower leg: No edema.      Left lower leg: No edema.      Comments: 5 out of 5 muscle strength in thighs and calves bilaterally.   Lymphadenopathy:      Head:      Right side of head: No submental, submandibular, preauricular, posterior auricular or occipital adenopathy.      Left side of head: No submental, submandibular, preauricular, posterior auricular or occipital adenopathy.      Cervical: No cervical adenopathy.      Right cervical: No superficial, deep or posterior cervical adenopathy.     Left cervical: No superficial, deep or posterior cervical adenopathy.      Upper Body:      Right upper body: No supraclavicular adenopathy.      Left upper  body: No supraclavicular adenopathy.   Skin:     General: Skin is warm and dry.   Neurological:      Mental Status: He is alert and oriented to person, place, and time.   Psychiatric:         Behavior: Behavior normal. Behavior is cooperative.              ASSESSMENT/PLAN:     Encounter Diagnoses   Name Primary?    Stage 3a chronic kidney disease (HCC) No motrin, ibuprofen, advil, alleve, naprosyn  with these medications.     Yes    Vaccine counseling Up to date.        Essential hypertension, benign Stable. Careful with diet and excercise at least 30 minutes 3-4 times a week. Check blood pressures at different times on different days. Can purchase own blood pressure monitor. If not, check at local pharmacy. Bake foods more and grill occasionally. Avoid fried foods. No salt. Use other seasonings.         Acquired hypothyroidism Check blood.        Asymptomatic microscopic hematuria Check blood. Check urine.        Weakness Check blood. HH. Foldable WC.       Cough. Try singuilar 10 mg at night for 4 nights. Call if no better.     Orders Placed This Encounter   Procedures    TSH W Reflex To Free T4    Free T4, (Free Thyroxine)    Urinalysis, Routine    Vitamin B12    Vitamin D    Sed Rate, Westergren (Automated)    CBC With Differential With Platelet    PTT, Activated    Prothrombin Time (PT)    Urine Culture, Routine       Meds This Visit:  Requested Prescriptions     Signed Prescriptions Disp Refills    montelukast (SINGULAIR) 10 MG Oral Tab 4 tablet 0     Sig: Take 1 tablet (10 mg total) by mouth nightly for 4 days.       Imaging & Referrals:  [facility]  DME - EXTERNAL       RTC after 4-29-25 for physical.          [1]   Allergies  Allergen Reactions    Pcn [Penicillins] NAUSEA AND VOMITING    Seasonal OTHER (SEE COMMENTS)     Watery eyes, coughing, sneezing

## 2024-10-30 NOTE — TELEPHONE ENCOUNTER
Referral declined  - not contracted insurance  Received: Today  Eladia Lau Maggie E., MD  Phone Number: [phone]     Hi, Dr. Oseguera  [facility] is not contracted with this patient's insurance. A different physician referred this patient to [facility]  on 10/4/24.  This patient was referred to [facility] on 10/7/24/ Please send info to that agency,    Please see contact info for [facility]. [facility] PHONE [phone], [phone]    Eladia/

## 2024-10-31 ENCOUNTER — MED REC SCAN ONLY (OUTPATIENT)
Dept: INTERNAL MEDICINE CLINIC | Facility: CLINIC | Age: 89
End: 2024-10-31

## 2024-10-31 ENCOUNTER — HOME HEALTH CHARGES (OUTPATIENT)
Dept: INTERNAL MEDICINE CLINIC | Facility: CLINIC | Age: 89
End: 2024-10-31

## 2024-10-31 DIAGNOSIS — I10 ESSENTIAL HYPERTENSION, BENIGN: ICD-10-CM

## 2024-10-31 DIAGNOSIS — R41.3 MEMORY LOSS: Primary | ICD-10-CM

## 2024-10-31 NOTE — TELEPHONE ENCOUNTER
Spoke with patient's daughter per WILIAN, Date of Birth verified  She stated patient is currently doing HH PT.  She is now requesting an order for  RN to see patient.   [facility] is not contracted with patient insurance.    She requested a RN order to be send to [facility] .  pls advise, thanks in advance.   Order pended   Tel # [phone]  Fax # [phone]

## 2024-11-07 ENCOUNTER — LAB ENCOUNTER (OUTPATIENT)
Dept: LAB | Facility: HOSPITAL | Age: 89
End: 2024-11-07
Attending: INTERNAL MEDICINE
Payer: MEDICARE

## 2024-11-07 LAB
BILIRUB UR QL: NEGATIVE
CLARITY UR: CLEAR
GLUCOSE UR-MCNC: NORMAL MG/DL
HGB UR QL STRIP.AUTO: NEGATIVE
KETONES UR-MCNC: NEGATIVE MG/DL
LEUKOCYTE ESTERASE UR QL STRIP.AUTO: NEGATIVE
NITRITE UR QL STRIP.AUTO: NEGATIVE
PH UR: 6.5 [PH] (ref 5–8)
PROT UR-MCNC: NEGATIVE MG/DL
SP GR UR STRIP: 1.01 (ref 1–1.03)
UROBILINOGEN UR STRIP-ACNC: NORMAL

## 2024-11-07 PROCEDURE — 81003 URINALYSIS AUTO W/O SCOPE: CPT | Performed by: INTERNAL MEDICINE

## 2024-11-07 PROCEDURE — 87086 URINE CULTURE/COLONY COUNT: CPT | Performed by: INTERNAL MEDICINE

## 2024-11-07 PROCEDURE — 87077 CULTURE AEROBIC IDENTIFY: CPT | Performed by: INTERNAL MEDICINE

## 2024-11-07 PROCEDURE — 87186 SC STD MICRODIL/AGAR DIL: CPT | Performed by: INTERNAL MEDICINE

## 2024-11-08 ENCOUNTER — TELEPHONE (OUTPATIENT)
Dept: INTERNAL MEDICINE CLINIC | Facility: CLINIC | Age: 89
End: 2024-11-08

## 2024-11-08 DIAGNOSIS — G89.29 CHRONIC BILATERAL LOW BACK PAIN WITHOUT SCIATICA: ICD-10-CM

## 2024-11-08 DIAGNOSIS — R53.1 WEAKNESS: Primary | ICD-10-CM

## 2024-11-08 DIAGNOSIS — M54.50 CHRONIC BILATERAL LOW BACK PAIN WITHOUT SCIATICA: ICD-10-CM

## 2024-11-08 DIAGNOSIS — R41.3 MEMORY LOSS: ICD-10-CM

## 2024-11-08 RX ORDER — TRAMADOL HYDROCHLORIDE 50 MG/1
75 TABLET ORAL EVERY EVENING
Qty: 45 TABLET | Refills: 0 | Status: SHIPPED | OUTPATIENT
Start: 2024-11-11

## 2024-11-08 NOTE — TELEPHONE ENCOUNTER
Refill Request    Medication request: traMADol 50 MG Oral Tab.  Take 1.5 tablets (75 mg total) by mouth every evening. DOSE ADJUSTMENT      LOV:10/3/2024 Terrence Mcgregor DO   Due back to clinic per last office note:  F/u in 4-6 weeks.   NOV: Visit date not found      [place]: 10/12/24 #45 (30 days)    Urine drug screen (if applicable): none  Pain contract: none    LOV plan (if weaning or changing medications): May increase tramadol to 75mg at bedtime.

## 2024-11-08 NOTE — TELEPHONE ENCOUNTER
Nya with [facility] states they received a referral for home care but did not designate disciplines needed or ordering physician. This along with clinical/visit notes need to be faxed to [phone]

## 2024-11-08 NOTE — TELEPHONE ENCOUNTER
When we put in the orders for home health we do put in those specific orders.  He does need home health and possibly caregiver but also help with fall precautions meaning home PT and home OT.  Caregiver the last office visit notes.

## 2024-11-11 ENCOUNTER — TELEPHONE (OUTPATIENT)
Facility: CLINIC | Age: 89
End: 2024-11-11

## 2024-11-11 ENCOUNTER — TELEPHONE (OUTPATIENT)
Dept: INTERNAL MEDICINE CLINIC | Facility: CLINIC | Age: 89
End: 2024-11-11

## 2024-11-11 RX ORDER — CIPROFLOXACIN 500 MG/1
500 TABLET, FILM COATED ORAL 2 TIMES DAILY
Qty: 14 TABLET | Refills: 0 | Status: SHIPPED | OUTPATIENT
Start: 2024-11-11 | End: 2024-11-18

## 2024-11-11 NOTE — TELEPHONE ENCOUNTER
Nya from [facility] called to ask if there was a change in patient's condition since they received orders for home health but patient was just discharged on 11/01/24. If there isn't any changes patient would have to wait 30 days until able to admit in home health due to insurance purposes.

## 2024-11-11 NOTE — TELEPHONE ENCOUNTER
Please see other message in the system just got that he cannot get any more home health services due to insurance for 30 days.

## 2024-11-11 NOTE — TELEPHONE ENCOUNTER
Referral entered 10/31/24 doesn't list any disciplines. See below.        Referral Type: Home Health Referral - External Dx: Weakness (R53.1)   Signed Referral [place]:        Comments: [facility]   Tel # [phone]  Fax # [phone]  Number of Visits: 8

## 2024-11-12 NOTE — TELEPHONE ENCOUNTER
[See urine culture results from 11/7/24]    Daughter/Nereida (Last signed Verbal Release verified) states pharmacy did not dispense the Cipro as there were interactions with other medications, she is not sure what medication the pharmacy was referring to. I made her aware I will call pharmacy and convey to Dr. Oseguera. She verbalized understanding. No further questions or concerns at this time.    I called [facility] pharmacy, spoke with rosemarie/pharmacist, made aware of above--> she states a QT elongation risk for interaction with Cipro and the following: simvastatin 20 MG Oral Tab, QUEtiapine Fumarate 25 MG Oral Tab, donepezil 10 MG Oral Tab. I made her aware I will convey the above to Dr. Oseguera. She verbalized understanding. No further questions or concerns at this time.    Dr. Oseguera - please advise

## 2024-11-12 NOTE — TELEPHONE ENCOUNTER
Daughter contacted and made aware of notes below. She states patient was told that home health RN would be sent to home. She states she will call if anything changes in patient's health. She verbalized understanding. No further questions or concerns at this time.

## 2024-11-12 NOTE — TELEPHONE ENCOUNTER
Spoke with Dariana hirsch tech, Date of Birth verified  She was informed of MD recommendation, she stated understanding, also meds is ready for , she will inform patient family.       KRISTA

## 2024-11-12 NOTE — TELEPHONE ENCOUNTER
Spoke with patient's daughter per WILIAN, Date of Birth verified.  She was informed of MD recommendation, she stated understanding.

## 2024-11-15 ENCOUNTER — TELEPHONE (OUTPATIENT)
Dept: INTERNAL MEDICINE CLINIC | Facility: CLINIC | Age: 89
End: 2024-11-15

## 2024-11-15 NOTE — TELEPHONE ENCOUNTER
Nya from [facility] calling to request clinical note for patient be faxed to [phone].

## 2024-11-19 ENCOUNTER — LAB ENCOUNTER (OUTPATIENT)
Dept: LAB | Age: 89
End: 2024-11-19
Attending: INTERNAL MEDICINE
Payer: MEDICARE

## 2024-11-19 ENCOUNTER — OFFICE VISIT (OUTPATIENT)
Dept: DERMATOLOGY CLINIC | Facility: CLINIC | Age: 89
End: 2024-11-19
Payer: COMMERCIAL

## 2024-11-19 DIAGNOSIS — R53.1 WEAKNESS: ICD-10-CM

## 2024-11-19 DIAGNOSIS — R31.21 ASYMPTOMATIC MICROSCOPIC HEMATURIA: ICD-10-CM

## 2024-11-19 DIAGNOSIS — E03.9 ACQUIRED HYPOTHYROIDISM: ICD-10-CM

## 2024-11-19 DIAGNOSIS — L28.1 PRURIGO NODULARIS: Primary | ICD-10-CM

## 2024-11-19 LAB
APTT PPP: 33 SECONDS (ref 23–36)
BASOPHILS # BLD AUTO: 0.03 X10(3) UL (ref 0–0.2)
BASOPHILS NFR BLD AUTO: 0.4 %
DEPRECATED RDW RBC AUTO: 43 FL (ref 35.1–46.3)
EOSINOPHIL # BLD AUTO: 0.53 X10(3) UL (ref 0–0.7)
EOSINOPHIL NFR BLD AUTO: 6.2 %
ERYTHROCYTE [DISTWIDTH] IN BLOOD BY AUTOMATED COUNT: 13.2 % (ref 11–15)
ERYTHROCYTE [SEDIMENTATION RATE] IN BLOOD: 35 MM/HR
HCT VFR BLD AUTO: 43.8 %
HGB BLD-MCNC: 14.5 G/DL
IMM GRANULOCYTES # BLD AUTO: 0.03 X10(3) UL (ref 0–1)
IMM GRANULOCYTES NFR BLD: 0.4 %
INR BLD: 1.07 (ref 0.8–1.2)
LYMPHOCYTES # BLD AUTO: 2.46 X10(3) UL (ref 1–4)
LYMPHOCYTES NFR BLD AUTO: 28.8 %
MCH RBC QN AUTO: 29.5 PG (ref 26–34)
MCHC RBC AUTO-ENTMCNC: 33.1 G/DL (ref 31–37)
MCV RBC AUTO: 89.2 FL
MONOCYTES # BLD AUTO: 1.01 X10(3) UL (ref 0.1–1)
MONOCYTES NFR BLD AUTO: 11.8 %
NEUTROPHILS # BLD AUTO: 4.49 X10 (3) UL (ref 1.5–7.7)
NEUTROPHILS # BLD AUTO: 4.49 X10(3) UL (ref 1.5–7.7)
NEUTROPHILS NFR BLD AUTO: 52.4 %
PLATELET # BLD AUTO: 183 10(3)UL (ref 150–450)
PROTHROMBIN TIME: 14.5 SECONDS (ref 11.6–14.8)
RBC # BLD AUTO: 4.91 X10(6)UL
T4 FREE SERPL-MCNC: 1.1 NG/DL (ref 0.8–1.7)
TSI SER-ACNC: 2.81 UIU/ML (ref 0.55–4.78)
VIT B12 SERPL-MCNC: 1052 PG/ML (ref 211–911)
VIT D+METAB SERPL-MCNC: 84.6 NG/ML (ref 30–100)
WBC # BLD AUTO: 8.6 X10(3) UL (ref 4–11)

## 2024-11-19 PROCEDURE — 85730 THROMBOPLASTIN TIME PARTIAL: CPT

## 2024-11-19 PROCEDURE — 36415 COLL VENOUS BLD VENIPUNCTURE: CPT

## 2024-11-19 PROCEDURE — 1159F MED LIST DOCD IN RCRD: CPT | Performed by: STUDENT IN AN ORGANIZED HEALTH CARE EDUCATION/TRAINING PROGRAM

## 2024-11-19 PROCEDURE — 84439 ASSAY OF FREE THYROXINE: CPT

## 2024-11-19 PROCEDURE — 82306 VITAMIN D 25 HYDROXY: CPT

## 2024-11-19 PROCEDURE — 1160F RVW MEDS BY RX/DR IN RCRD: CPT | Performed by: STUDENT IN AN ORGANIZED HEALTH CARE EDUCATION/TRAINING PROGRAM

## 2024-11-19 PROCEDURE — 85652 RBC SED RATE AUTOMATED: CPT

## 2024-11-19 PROCEDURE — 99214 OFFICE O/P EST MOD 30 MIN: CPT | Performed by: STUDENT IN AN ORGANIZED HEALTH CARE EDUCATION/TRAINING PROGRAM

## 2024-11-19 PROCEDURE — 82607 VITAMIN B-12: CPT

## 2024-11-19 PROCEDURE — 84443 ASSAY THYROID STIM HORMONE: CPT

## 2024-11-19 PROCEDURE — 85025 COMPLETE CBC W/AUTO DIFF WBC: CPT

## 2024-11-19 PROCEDURE — 85610 PROTHROMBIN TIME: CPT

## 2024-11-19 RX ORDER — TACROLIMUS 1 MG/G
OINTMENT TOPICAL
Qty: 60 G | Refills: 11 | Status: SHIPPED | OUTPATIENT
Start: 2024-11-19

## 2024-11-19 NOTE — PROGRESS NOTES
November 19, 2024    Established patient     Referred by:   No referring provider defined for this encounter.     CHIEF COMPLAINT: Itching     HISTORY OF PRESENT ILLNESS: .    1.Itching   Location: Legs, Abdomen  Duration: Years   Signs and symptoms: Redness, Blisters   Current treatment: Gold Bond,   Past treatments: None       DERM HISTORY:  History of skin cancer: No  History of chronic skin disease/condition: No    FAMILY HISTORY:  History of melanoma: No  History of chronic skin disease/condition: No    History/Other:    REVIEW OF SYSTEMS:  Constitutional: Denies fever, chills, unintentional weight loss.   Skin as per HPI    PAST MEDICAL HISTORY:  Past Medical History:    Acid reflux    Allergic rhinitis    Anxiety    Arthritis    Cataract    OU    Dementia (HCC)    Depression    Dermatochalasis of both eyelids    OU    Elevated PSA    Negative biopsies    Elevated PSA    and free PSA at 19%-Negative biopsies    Essential hypertension    Floaters    OD    Foot drop    w/ weakness    Obesity    Other and unspecified hyperlipidemia    Pinguecula of both eyes    OU    Stomach ulcer    per: NG-bleeding    Traumatic brain injury (HCC)       Medications  Current Outpatient Medications   Medication Sig Dispense Refill    traMADol 50 MG Oral Tab Take 1.5 tablets (75 mg total) by mouth every evening. 45 tablet 0    donepezil 10 MG Oral Tab Take 1 tablet (10 mg total) by mouth nightly.      omega-3 fatty acids 1000 MG Oral Cap Take 2,000 mg by mouth daily.      gabapentin 100 MG Oral Cap Take 3 capsules (300 mg total) by mouth nightly. 270 capsule 3    simvastatin 20 MG Oral Tab Take 1 tablet (20 mg total) by mouth nightly. 90 tablet 3    fluticasone propionate 50 MCG/ACT Nasal Suspension 2 sprays by Nasal route daily. 48 mL 1    losartan 25 MG Oral Tab Take 1 tablet (25 mg total) by mouth daily. 90 tablet 3    tamsulosin 0.4 MG Oral Cap Take 1 capsule (0.4 mg total) by mouth daily. 90 capsule 3    levocetirizine 5 MG  Oral Tab Take 1 tablet (5 mg total) by mouth every evening. 90 tablet 3    omeprazole 20 MG Oral Capsule Delayed Release Take 1 capsule (20 mg total) by mouth daily. 90 capsule 3    finasteride 5 MG Oral Tab Take 1 tablet (5 mg total) by mouth daily. 90 tablet 3    Memantine HCl ER 28 MG Oral Capsule SR 24 Hr Take 1 capsule (28 mg total) by mouth daily.      QUEtiapine Fumarate 25 MG Oral Tab TAKE 1 TABLET BY MOUTH AT BEDTIME CAN GIVE ADDITIONAL TABLET AS NEEDED FOR AGITATION 180 tablet 0    aspirin 81 MG Oral Tab Take 1 tablet (81 mg total) by mouth daily.      acetaminophen (TYLENOL EXTRA STRENGTH) 500 MG Oral Tab Take 1 tablet (500 mg total) by mouth every 6 (six) hours as needed for Pain.      Vitamin D3 (VITAMIN D3) 2000 UNITS Oral Cap Take 1 capsule (2,000 Units total) by mouth daily.      Vitamin B-12 (VITAMIN B12) 1000 MCG Oral Tab Take 1 tablet (1,000 mcg total) by mouth daily.         Objective:    PHYSICAL EXAM:  General: awake, alert, no acute distress  Skin: Skin exam was performed today including the following: trunk and extremities. Pertinent findings include:   - with numerous excoriated pink papules and plaques    ASSESSMENT & PLAN:  Pathophysiology of diagnoses discussed with patient.  Therapeutic options reviewed. Risks, benefits, and alternatives discussed with patient. Instructions reviewed at length.    #Atopic dermatitis   #Prurigo nodularis  #Renal pruritus - unclear how large of a role this is playing in patient's itch  - Start tacrolimus 0.1% to affected areas daily   - Plan to start Dupixent (dupilumab), an IL-4 receptor alpha antagonist indicated for moderate to severe atopic dermatitis whose disease is not controlled by topicals  - Dupixent prescribed today and pending insurance approval. Inject 2 syringes (600 mg) SQ on day 1, then inject 1 syringe (300 mg) day 15 and every 2 weeks thereafter. (ADRs discussed, but not limited to: injection site reactions, hypersensitivity reaction,  conjunctivitis, blepharitis, oral herpes, keratitis, eye pruritus, other herpes simplex virus infection, and dry eye.)  - No live vaccines     Return to clinic: 3 months or sooner if something concerning arises     Hardik Vargas MD

## 2024-11-20 ENCOUNTER — TELEPHONE (OUTPATIENT)
Dept: DERMATOLOGY CLINIC | Facility: CLINIC | Age: 89
End: 2024-11-20

## 2024-11-20 PROBLEM — E53.8 VITAMIN B12 DEFICIENCY: Status: ACTIVE | Noted: 2024-11-20

## 2024-11-20 PROBLEM — E55.9 VITAMIN D DEFICIENCY: Status: ACTIVE | Noted: 2024-11-20

## 2024-11-20 NOTE — TELEPHONE ENCOUNTER
Dupixent form completed and faxed to [employer] along with insurance information and clinical notes. Awaiting insurance determination.

## 2024-11-21 NOTE — PROGRESS NOTES
CBC Normal (white blood cells and red blood cells and platelets),   Thyroid is good.   B12 level is higher.  Will stop B12.  Recheck B12 level in 3 months.  Sed rate which is nonspecific marker of inflammation is slightly elevated.  Recheck sed rate in 3 months.  Clotting factors are normal.    Vitamin D level is in the higher limits of normal.  Can decrease vitamin D to 2000 Monday Wednesday Friday.  Recheck vitamin D level in 3 months.

## 2024-11-25 ENCOUNTER — TELEPHONE (OUTPATIENT)
Dept: INTERNAL MEDICINE CLINIC | Facility: CLINIC | Age: 89
End: 2024-11-25

## 2024-11-25 NOTE — TELEPHONE ENCOUNTER
Nya from [facility] called in and said patient was discharged on 11/1/24 from physical therapy. Nya wanted to know if anything change in her status of condition. If so, please fax over change to #[phone]. Please advise           See telephone encounter 11/15

## 2024-12-05 ENCOUNTER — MED REC SCAN ONLY (OUTPATIENT)
Dept: INTERNAL MEDICINE CLINIC | Facility: CLINIC | Age: 89
End: 2024-12-05

## 2024-12-10 DIAGNOSIS — G89.29 CHRONIC BILATERAL LOW BACK PAIN WITHOUT SCIATICA: ICD-10-CM

## 2024-12-10 DIAGNOSIS — M54.50 CHRONIC BILATERAL LOW BACK PAIN WITHOUT SCIATICA: ICD-10-CM

## 2024-12-10 RX ORDER — TRAMADOL HYDROCHLORIDE 50 MG/1
75 TABLET ORAL NIGHTLY PRN
Qty: 45 TABLET | Refills: 0 | Status: SHIPPED | OUTPATIENT
Start: 2024-12-10

## 2024-12-10 NOTE — TELEPHONE ENCOUNTER
Refill Request    Medication request: traMADol 50 MG Oral Tab Take 1.5 tablets (75 mg total) by mouth every evening.     LOV:10/3/2024 Terrence Mcgregor DO   Due back to clinic per last office note:  F/u in 4-6 weeks.   NOV: Visit date not found      [place]: 11/11/2024 #45    Urine drug screen (if applicable): none  Pain contract: none    LOV plan (if weaning or changing medications): \"May increase tramadol to 75mg at bedtime. \"

## 2025-01-09 DIAGNOSIS — G89.29 CHRONIC BILATERAL LOW BACK PAIN WITHOUT SCIATICA: ICD-10-CM

## 2025-01-09 DIAGNOSIS — M54.50 CHRONIC BILATERAL LOW BACK PAIN WITHOUT SCIATICA: ICD-10-CM

## 2025-01-10 RX ORDER — TRAMADOL HYDROCHLORIDE 50 MG/1
75 TABLET ORAL NIGHTLY PRN
Qty: 45 TABLET | Refills: 0 | Status: SHIPPED | OUTPATIENT
Start: 2025-01-10

## 2025-01-10 NOTE — TELEPHONE ENCOUNTER
Refill Request    Medication request:   traMADol 50 MG Oral Tab       LOV: 10/3/2024 Terrence Mcgregor DO   RTC: 4-6 weeks  NOV: Visit date not found      [place]: 10/12/2024 #30 days    UDS: (if applicable): N/A  Pain contract: N/A    LOV plan (if weaning or changing medications): May increase tramadol to 75mg at bedtime.

## 2025-01-28 ENCOUNTER — PATIENT MESSAGE (OUTPATIENT)
Dept: INTERNAL MEDICINE CLINIC | Facility: CLINIC | Age: OVER 89
End: 2025-01-28

## 2025-01-28 ENCOUNTER — TELEPHONE (OUTPATIENT)
Dept: INTERNAL MEDICINE CLINIC | Facility: CLINIC | Age: OVER 89
End: 2025-01-28

## 2025-01-28 DIAGNOSIS — R53.1 WEAKNESS: Primary | ICD-10-CM

## 2025-01-28 NOTE — TELEPHONE ENCOUNTER
Doctor you gave me a referral for a wheel chair for my father I received   the chair without foot rest , they told me they needed a referral for the foot rest.  the name of the company is [employer]   [phone]  fax [phone]  thank you  Nereida Saez

## 2025-01-30 NOTE — TELEPHONE ENCOUNTER
Order faxed to [employer] successful fax confirmation received.   Patient notified via [employer].

## 2025-02-09 DIAGNOSIS — G89.29 CHRONIC BILATERAL LOW BACK PAIN WITHOUT SCIATICA: ICD-10-CM

## 2025-02-09 DIAGNOSIS — M54.50 CHRONIC BILATERAL LOW BACK PAIN WITHOUT SCIATICA: ICD-10-CM

## 2025-02-10 RX ORDER — TRAMADOL HYDROCHLORIDE 50 MG/1
75 TABLET ORAL NIGHTLY PRN
Qty: 45 TABLET | Refills: 0 | Status: SHIPPED | OUTPATIENT
Start: 2025-02-10

## 2025-02-10 NOTE — TELEPHONE ENCOUNTER
Refill Request    Medication request: TRAMADOL 50 MG Oral Tab. TAKE 1.5 TABLETS (75 MG TOTAL) BY MOUTH NIGHTLY AS NEEDED FOR PAIN.      LOV:10/3/2024 Terrence Mcgregor DO   Due back to clinic per last office note:  F/u in 4-6 weeks.   NOV: Visit date not found      [place]: 1/10/25 #45 (30 days)    Urine drug screen (if applicable): none  Pain contract: none    LOV plan (if weaning or changing medications): May increase tramadol to 75mg at bedtime.

## 2025-02-11 PROBLEM — R53.1 WEAKNESS GENERALIZED: Status: ACTIVE | Noted: 2025-02-11

## 2025-02-13 ENCOUNTER — MED REC SCAN ONLY (OUTPATIENT)
Dept: INTERNAL MEDICINE CLINIC | Facility: CLINIC | Age: OVER 89
End: 2025-02-13

## 2025-02-13 DIAGNOSIS — F03.90 DEMENTIA (HCC): ICD-10-CM

## 2025-02-13 DIAGNOSIS — E03.9 HYPOTHYROIDISM: ICD-10-CM

## 2025-02-13 DIAGNOSIS — R41.3 MEMORY LOSS: ICD-10-CM

## 2025-02-17 RX ORDER — FINASTERIDE 5 MG/1
5 TABLET, FILM COATED ORAL DAILY
Qty: 90 TABLET | Refills: 3 | Status: SHIPPED | OUTPATIENT
Start: 2025-02-17

## 2025-02-17 NOTE — TELEPHONE ENCOUNTER
Refill Per Protocol     Requested Prescriptions   Pending Prescriptions Disp Refills    FINASTERIDE 5 MG Oral Tab [Pharmacy Med Name: FINASTERIDE 5 MG TABLET] 90 tablet 3     Sig: Take 1 tablet (5 mg total) by mouth daily.       Genitourinary Medications Passed - 2/17/2025  9:46 AM        Passed - Patient does not have pulmonary hypertension on problem list        Passed - In person appointment or virtual visit in the past 12 mos or appointment in next 3 mos     Recent Outpatient Visits              [facility]    [facility]    3 months ago Stage 3a chronic kidney disease (HCC)    [facility]    Office Visit    4 months ago Chronic bilateral low back pain without sciatica    [facility] Terrence Mcgregor DO    Office Visit    5 months ago History of COVID-19    [facility] Randy Pryor MD    Office Visit    7 months ago Urinary urgency    [facility] Meir Houston MD    Office Visit          Future Appointments         Provider Department Appt Notes    In 2 months Dacia Oseguera MD [facility]                     Passed - Medication is active on med list               Future Appointments         Provider Department Appt Notes    In 2 months Dacia Oseguera MD [facility]           Recent Outpatient Visits              3 months [facility]    [facility]    3 months ago Stage 3a chronic kidney disease (HCC)    [facility] Dacia Oseguera MD    Office Visit    4 months ago Chronic bilateral low back pain without  sciatica    [facility], Terrence Ramos DO    Office Visit    5 months ago History of COVID-19    [facility], Randy Jacobs MD    Office Visit    7 months ago Urinary urgency    [facility] [place]    Office Visit

## 2025-02-27 ENCOUNTER — PATIENT MESSAGE (OUTPATIENT)
Dept: INTERNAL MEDICINE CLINIC | Facility: CLINIC | Age: OVER 89
End: 2025-02-27

## 2025-02-27 RX ORDER — FLUTICASONE PROPIONATE 50 MCG
2 SPRAY, SUSPENSION (ML) NASAL DAILY
Qty: 48 G | Refills: 3 | Status: SHIPPED | OUTPATIENT
Start: 2025-02-27

## 2025-02-27 NOTE — TELEPHONE ENCOUNTER
Refill passed per [facility] protocols.    Requested Prescriptions   Pending Prescriptions Disp Refills    FLUTICASONE PROPIONATE 50 MCG/ACT Nasal Suspension [Pharmacy Med Name: FLUTICASONE PROP 50 MCG SPRAY] 48 g 3     Sig: SPRAY 2 SPRAYS BY NASAL ROUTE DAILY       Allergy Medication Protocol Passed - 2/27/2025  3:21 PM        Passed - In person appointment or virtual visit in the past 12 mos or appointment in next 3 mos        Passed - Medication is active on med list

## 2025-03-10 DIAGNOSIS — M54.50 CHRONIC BILATERAL LOW BACK PAIN WITHOUT SCIATICA: ICD-10-CM

## 2025-03-10 DIAGNOSIS — G89.29 CHRONIC BILATERAL LOW BACK PAIN WITHOUT SCIATICA: ICD-10-CM

## 2025-03-10 RX ORDER — TRAMADOL HYDROCHLORIDE 50 MG/1
75 TABLET ORAL NIGHTLY PRN
Qty: 45 TABLET | Refills: 0 | Status: SHIPPED | OUTPATIENT
Start: 2025-03-12

## 2025-03-10 NOTE — TELEPHONE ENCOUNTER
Refill Request    Medication request: TRAMADOL 50 MG Oral Tab.  TAKE 1.5 TABLETS (75 MG TOTAL) BY MOUTH NIGHTLY AS NEEDED FOR PAIN.      LOV:10/3/2024 Terrence Mcgregor DO   Due back to clinic per last office note:  F/u in 4-6 weeks.   NOV: Visit date not found      [place]: 2/10/2025 #45 (30 days)    Urine drug screen (if applicable): N/A  Pain contract: N/A    LOV plan (if weaning or changing medications): May increase tramadol to 75mg at bedtime.     MCM sent.

## 2025-03-11 ENCOUNTER — LAB ENCOUNTER (OUTPATIENT)
Dept: LAB | Age: OVER 89
End: 2025-03-11
Attending: INTERNAL MEDICINE
Payer: MEDICARE

## 2025-03-11 DIAGNOSIS — E55.9 VITAMIN D DEFICIENCY: ICD-10-CM

## 2025-03-11 DIAGNOSIS — R70.0 ELEVATED SED RATE: ICD-10-CM

## 2025-03-11 DIAGNOSIS — E53.8 VITAMIN B12 DEFICIENCY: ICD-10-CM

## 2025-03-11 LAB
ERYTHROCYTE [SEDIMENTATION RATE] IN BLOOD: 25 MM/HR
VIT B12 SERPL-MCNC: 499 PG/ML (ref 211–911)
VIT D+METAB SERPL-MCNC: 63.9 NG/ML (ref 30–100)

## 2025-03-11 PROCEDURE — 82607 VITAMIN B-12: CPT

## 2025-03-11 PROCEDURE — 36415 COLL VENOUS BLD VENIPUNCTURE: CPT

## 2025-03-11 PROCEDURE — 85652 RBC SED RATE AUTOMATED: CPT

## 2025-03-11 PROCEDURE — 82306 VITAMIN D 25 HYDROXY: CPT

## 2025-03-13 ENCOUNTER — NURSE TRIAGE (OUTPATIENT)
Dept: INTERNAL MEDICINE CLINIC | Facility: CLINIC | Age: OVER 89
End: 2025-03-13

## 2025-03-13 ENCOUNTER — PATIENT MESSAGE (OUTPATIENT)
Dept: INTERNAL MEDICINE CLINIC | Facility: CLINIC | Age: OVER 89
End: 2025-03-13

## 2025-03-13 NOTE — TELEPHONE ENCOUNTER
[employer] message sent.  I am unable to call the patient for I am working from home without a phone.   The patient is 89 years old.    Audie Houston to [facility] (supporting Dacia Oseguera MD)         3/13/25 10:10 AM  Doctor: my father is defecating every other day, I am giving him prune juice and stool softener 1 pill a day 100 mg, he is taking  75 mg Tramadol every night.  what else can I do?  Nereida Saez

## 2025-03-13 NOTE — TELEPHONE ENCOUNTER
Dr. Oseguera please advise, the patient has not gotten vitamin D since November. Should he begin taking it Monday Wednesday Friday?

## 2025-03-14 NOTE — TELEPHONE ENCOUNTER
Action Requested: Summary for Provider     []  Critical Lab, Recommendations Needed  [x] Need Additional Advice  []   FYI    []   Need Orders  [] Need Medications Sent to Pharmacy  []  Other     SUMMARY: patient's daughter Nereida (on HIPAA) calling with concerns of her father experiencing Constipation. Last bowel movement was 2 days ago and she could hear him \"yelling.\" She is his caregiver and gives him prune juice, Stool softener's 100 mg and Benafiber daily. He is not very active, uses a walker and needs assist with ADL's. Sometimes he spits his food out. Per his daughter he takes Tramadol 75 mg nightly. Advised per protocol:  warm the prune juice in microwave (not too hot) and ok to give Milk of Magnesia along with high fiber foods (listed examples), increase water, continue Benafiber and stool softener's. He is already scheduled for his annual physical on 4/28/25.     His daughter is wondering if she's possibly \"missing something else she can do to help.\"     Please advise anything further, thanks    Reason for call: Constipation  Onset: Data Unavailable                   Reason for Disposition   Uses laxative (e.g., PEG / Miralax, Milk of Magnesia) or enema more than once a month    Protocols used: Constipation-A-[place]

## 2025-04-11 DIAGNOSIS — M54.50 CHRONIC BILATERAL LOW BACK PAIN WITHOUT SCIATICA: ICD-10-CM

## 2025-04-11 DIAGNOSIS — G89.29 CHRONIC BILATERAL LOW BACK PAIN WITHOUT SCIATICA: ICD-10-CM

## 2025-04-11 NOTE — TELEPHONE ENCOUNTER
Refill Request    Medication request: traMADol 50 MG Oral Tab. Take 1.5 tablets (75 mg total) by mouth nightly as needed for Pain.      LOV:10/3/2024 Terrence Mcgregor DO   Due back to clinic per last office note:  F/u in 4-6 weeks.      NOV: Visit date not found      [place]: 3/11/25 #45 (30 days)    Urine drug screen (if applicable): none  Pain contract: none    LOV plan (if weaning or changing medications): May increase tramadol to 75mg at bedtime.

## 2025-04-12 RX ORDER — TRAMADOL HYDROCHLORIDE 50 MG/1
75 TABLET ORAL NIGHTLY PRN
Qty: 45 TABLET | Refills: 0 | Status: SHIPPED | OUTPATIENT
Start: 2025-04-12

## 2025-04-23 PROBLEM — E66.01 SEVERE OBESITY (BMI 35.0-39.9) WITH COMORBIDITY (HCC): Chronic | Status: RESOLVED | Noted: 2024-04-09 | Resolved: 2025-04-23

## 2025-04-27 PROBLEM — R52 PAIN: Status: RESOLVED | Noted: 2024-04-29 | Resolved: 2025-04-27

## 2025-04-27 PROBLEM — R53.1 WEAKNESS GENERALIZED: Status: RESOLVED | Noted: 2025-02-11 | Resolved: 2025-04-27

## 2025-04-28 ENCOUNTER — TELEPHONE (OUTPATIENT)
Dept: DERMATOLOGY CLINIC | Facility: CLINIC | Age: OVER 89
End: 2025-04-28

## 2025-04-28 ENCOUNTER — OFFICE VISIT (OUTPATIENT)
Dept: INTERNAL MEDICINE CLINIC | Facility: CLINIC | Age: OVER 89
End: 2025-04-28

## 2025-04-28 VITALS
DIASTOLIC BLOOD PRESSURE: 64 MMHG | HEART RATE: 76 BPM | BODY MASS INDEX: 35.82 KG/M2 | WEIGHT: 215 LBS | HEIGHT: 65 IN | SYSTOLIC BLOOD PRESSURE: 99 MMHG | OXYGEN SATURATION: 98 % | TEMPERATURE: 98 F

## 2025-04-28 DIAGNOSIS — N18.31 STAGE 3A CHRONIC KIDNEY DISEASE (HCC): Chronic | ICD-10-CM

## 2025-04-28 DIAGNOSIS — F01.B3 MODERATE VASCULAR DEMENTIA WITH MOOD DISTURBANCE (HCC): ICD-10-CM

## 2025-04-28 DIAGNOSIS — R05.3 CHRONIC COUGH: ICD-10-CM

## 2025-04-28 DIAGNOSIS — E03.9 ACQUIRED HYPOTHYROIDISM: ICD-10-CM

## 2025-04-28 DIAGNOSIS — E53.8 VITAMIN B12 DEFICIENCY: ICD-10-CM

## 2025-04-28 DIAGNOSIS — K59.09 OTHER CONSTIPATION: ICD-10-CM

## 2025-04-28 DIAGNOSIS — Z71.85 VACCINE COUNSELING: ICD-10-CM

## 2025-04-28 DIAGNOSIS — E55.9 VITAMIN D DEFICIENCY: ICD-10-CM

## 2025-04-28 DIAGNOSIS — R41.3 MEMORY LOSS: ICD-10-CM

## 2025-04-28 DIAGNOSIS — I10 ESSENTIAL HYPERTENSION, BENIGN: ICD-10-CM

## 2025-04-28 DIAGNOSIS — R68.89 SENSATION OF FEELING COLD: ICD-10-CM

## 2025-04-28 DIAGNOSIS — N32.3 DIVERTICULUM OF BLADDER: ICD-10-CM

## 2025-04-28 DIAGNOSIS — E78.2 MIXED HYPERLIPIDEMIA: ICD-10-CM

## 2025-04-28 DIAGNOSIS — H25.13 AGE-RELATED NUCLEAR CATARACT OF BOTH EYES: Primary | ICD-10-CM

## 2025-04-28 LAB
APPEARANCE: CLEAR
BILIRUBIN: NEGATIVE
GLUCOSE (URINE DIPSTICK): NEGATIVE MG/DL
KETONES (URINE DIPSTICK): NEGATIVE MG/DL
LEUKOCYTES: NEGATIVE
MULTISTIX LOT#: NORMAL NUMERIC
NITRITE, URINE: NEGATIVE
OCCULT BLOOD: NEGATIVE
PH, URINE: 7 (ref 4.5–8)
PROTEIN (URINE DIPSTICK): NEGATIVE MG/DL
SPECIFIC GRAVITY: 1.01 (ref 1–1.03)
URINE-COLOR: YELLOW
UROBILINOGEN,SEMI-QN: 1 MG/DL (ref 0–1.9)

## 2025-04-28 RX ORDER — TRIAMCINOLONE ACETONIDE 1 MG/G
1 CREAM TOPICAL 2 TIMES DAILY
Qty: 80 G | Refills: 5 | Status: SHIPPED | OUTPATIENT
Start: 2025-04-28

## 2025-04-28 NOTE — TELEPHONE ENCOUNTER
Triamcinolone sent but I do not expect it to be very effective for PN. Please also let them know that the tramadol is likely making him more itchy which worsens the PN. Would highly recommend dupixent given how effective and safe it is.      Ok to follow with [facility]     Hardik Vargas MD

## 2025-04-28 NOTE — TELEPHONE ENCOUNTER
Patients daughter called    Scheduled visit with Dr. Costa on 6/20/25 time restrictions we given.    States did not want to see .     Asking Dr. Vargas to refill medications. Please call

## 2025-04-28 NOTE — PATIENT INSTRUCTIONS
ASSESSMENT/PLAN:     Encounter Diagnoses   Name Primary?    Age-related nuclear cataract of both eyes Stable Fu optho.    Yes    Stage 3a chronic kidney disease (HCC) No motrin, ibuprofen, advil, alleve, naprosyn  with these medications.  Hydrate at least 48 oz. water a day.        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        Diverticulum of bladder Stable.        Essential hypertension, benign Lower. STOP losartan and call in 2 weeks with blood pressures.  Careful with diet and excercise at least 30 minutes 3-4 times a week. Check blood pressures at different times on different days. Can purchase own blood pressure monitor. If not, check at local pharmacy. Bake foods more and grill occasionally. Avoid fried foods. No salt. Use other seasonings.         Mixed hyperlipidemia Check blood.        Acquired hypothyroidism Stable.           Vaccine counseling Up to date.        Vitamin B12 deficiency Higher. Check blood 7-25.        Vitamin D deficiency higher. Check blood 7-25.        Chronic cough Check on xyzal 5 mg at night. Elevate head of bed.        Other constipation Try benefiber 1 tsb. a day and miralax every other day.       Cold feeling. STOP losartan. Call in 2 week. Check blood.     Sleepiness. Check urine. Decrease gabapentin to 200 mg at night. Check blood. STOP losartan.     Orders Placed This Encounter   Procedures    Lipid Panel    Comp Metabolic Panel (14)    URINALYSIS, AUTO, W/O SCOPE       Meds This Visit:  Requested Prescriptions      No prescriptions requested or ordered in this encounter       Imaging & Referrals:  None      RTC 3 months for FU HTN.

## 2025-04-28 NOTE — TELEPHONE ENCOUNTER
LOV 8/12/22 by [facility]  11/19/2024 by [facility]    S/w pt's  daughter, pt in background.   Pt was RXed tacrolimus in Nov which was not covered and they never started this.     Rxed dupxient but they never followed up with dupixent [facility] - daughter states she wants to discuss further with PCP, she is unsure if she wants to go through [facility].    She wants to know if there is any other cream that can be RXed for itching, pt with hx of prurigo nodularis.     Of notes, they wish to follow up  with [facility] moving forward, however next appt scheduled with [facility] is for 6/20/25  - please advise here

## 2025-04-28 NOTE — PROGRESS NOTES
HPI:    Patient ID: Audie Houston is a 89 year old male.    Audie Houston is a 89 year old male who presents for a complete physical exam.   Audie Houston is a 89 year old male who presents for a Medicare Assessment.     Chief Complaint   Patient presents with    Wellness Visit     Patient states he is here for an Medicare Wellness Visit           Labs:   Lab Results   Component Value Date/Time    WBC 8.6 11/19/2024 12:29 PM    HGB 14.5 11/19/2024 12:29 PM    .0 11/19/2024 12:29 PM      Lab Results   Component Value Date/Time     (H) 05/17/2024 11:22 AM     05/17/2024 11:22 AM    K 4.5 05/17/2024 11:22 AM     05/17/2024 11:22 AM    CO2 29.0 05/17/2024 11:22 AM    CREATSERUM 1.27 05/17/2024 11:22 AM    CA 9.1 05/17/2024 11:22 AM    ALB 3.7 05/10/2024 10:47 AM    TP 6.7 05/10/2024 10:47 AM    ALKPHO 73 05/10/2024 10:47 AM    AST 16 05/10/2024 10:47 AM    ALT 20 05/10/2024 10:47 AM    BILT 0.5 05/10/2024 10:47 AM    TSH 2.813 11/19/2024 12:29 PM    T4F 1.1 11/19/2024 12:29 PM        Lab Results   Component Value Date/Time    CHOLEST 117 03/29/2024 09:20 AM    HDL 40 03/29/2024 09:20 AM    TRIG 146 03/29/2024 09:20 AM    LDL 52 03/29/2024 09:20 AM    NONHDLC 77 03/29/2024 09:20 AM       No results found for: \"A1C\"   Lab Results   Component Value Date    VITD 63.9 03/11/2025         No recommendations at this time    Allergies:Allergies[1]  Current Medications[2]   Past Medical History[3]   Past Surgical History[4]   Family History[5]   Social History:  Social Hx on file[6]    History/Other:     Problem List[7]      No LMP for male patient.    Hypertension  Patient is here for follow up of hypertension. BP at home: not check.   Has been compliant with medications.  Exercise level: not active and has been following low salt diet.  Weight has been stable.  Wt Readings from Last 3 Encounters:   04/28/25 215 lb (97.5 kg)   10/29/24 214 lb (97.1 kg)   04/29/24 214 lb 9.6 oz (97.3 kg)     BP  Readings from Last 3 Encounters:   04/28/25 99/64   10/29/24 118/76   08/22/24 116/60     Labs:   Lab Results   Component Value Date/Time     (H) 05/17/2024 11:22 AM     05/17/2024 11:22 AM    K 4.5 05/17/2024 11:22 AM     05/17/2024 11:22 AM    CO2 29.0 05/17/2024 11:22 AM    CREATSERUM 1.27 05/17/2024 11:22 AM    CA 9.1 05/17/2024 11:22 AM    AST 16 05/10/2024 10:47 AM    ALT 20 05/10/2024 10:47 AM    TSH 2.813 11/19/2024 12:29 PM    T4F 1.1 11/19/2024 12:29 PM        Lab Results   Component Value Date/Time    CHOLEST 117 03/29/2024 09:20 AM    HDL 40 03/29/2024 09:20 AM    TRIG 146 03/29/2024 09:20 AM    LDL 52 03/29/2024 09:20 AM    NONHDLC 77 03/29/2024 09:20 AM            Tobacco:  He smoked tobacco in the past but quit greater than 12 months ago.  Tobacco Use[8]     CAGE Alcohol Screen:   CAGE screening score of 0 on 4/27/2025, showing low risk of alcohol abuse.        Patient Care Team:  aDcia Oseguera MD as PCP - General (Internal Medicine)  Rosalee Watters APRN as Palliative Care Provider (Nurse Practitioner)  Katalina Rojas MD as Consulting Physician (OTOLARYNGOLOGY)  Ej Robert MD as Referring Physician (NEUROLOGY)  Vanessa Costa MD as Consulting Physician (DERMATOLOGY)  Meir Houston MD as Consulting Physician (UROLOGY)  Per daughter spits up a lot.  Forgets to eat and eats occasionally.  Does not have any difficulty swallowing.    Feels cold a lot.  No fevers per daughter.    Daughter noticed blood on underwear.  Does not sure if it is blood in the front or the back.  Does occasionally straight for bowel movement.        Review of Systems   Constitutional:  Positive for activity change, appetite change and fatigue. Negative for chills, diaphoresis, fever and unexpected weight change.        More wheelchair when goes out.  Very limited activity seems to be weak more.   HENT: Negative.  Negative for congestion, dental problem, drooling, ear discharge, ear pain, facial  swelling, hearing loss, mouth sores, nosebleeds, postnasal drip, rhinorrhea, sinus pressure, sinus pain, sneezing, sore throat, tinnitus, trouble swallowing and voice change.    Eyes: Negative.  Negative for photophobia, pain, discharge, redness, itching and visual disturbance.   Respiratory: Negative.  Negative for apnea, cough, choking, chest tightness, shortness of breath, wheezing and stridor.    Cardiovascular: Negative.  Negative for chest pain, palpitations and leg swelling.   Gastrointestinal:  Positive for constipation (q2-3 days.). Negative for abdominal distention, abdominal pain, anal bleeding, blood in stool, diarrhea, nausea, rectal pain and vomiting.        Uses Benefiber every day.  Colace 1 usually helps but then has diarrhea.   Endocrine: Negative for cold intolerance, heat intolerance, polydipsia, polyphagia and polyuria.   Genitourinary: Negative.  Negative for decreased urine volume, difficulty urinating, dysuria, flank pain, frequency, genital sores, hematuria, penile discharge, penile pain, penile swelling, scrotal swelling, testicular pain and urgency.   Skin: Negative.  Negative for color change, pallor, rash and wound.   Neurological: Negative.  Negative for dizziness, tremors, seizures, syncope, facial asymmetry, speech difficulty, weakness, light-headedness, numbness and headaches.   Psychiatric/Behavioral: Negative.  Negative for agitation, behavioral problems, confusion, decreased concentration, dysphoric mood, hallucinations, self-injury, sleep disturbance and suicidal ideas. The patient is not nervous/anxious and is not hyperactive.    All other systems reviewed and are negative.          Functional Ability/Status:   Audie Houston has some abnormal functions as listed below:  He has Dressing and/or Bathing issues based on screening of functional status.  Difficulty dressing or bathing?: (Patient-Rptd) Yes  Bathing or Showering: (Patient-Rptd) Cannot do without help  Dressing:  (Patient-Rptd) Cannot do without help  He has Toileting difficulties based on screening of functional status.He has Eating difficulties based on screening of functional status.He has Driving difficulties based on screening of functional status. He has Meal Preparation difficulties based on screening of functional status.He has difficulties Managing Money/Bills based on screening of functional status.He has difficulties Shopping for Groceries based on screening of functional status. He has difficulties Taking Meds as Rx'd based on screening of functional status. He has Hearing problems based on screening of functional status.He has Vision problems based on screening of functional status. He has Walking problems based on screening of functional status. He has problems with Daily Activities based on screening of functional status. He has problems with Memory based on screening of functional status.         Fall Risk Assessment:   He has been screened for Falls and is High Risk. Fall Prevention information provided to patient in After Visit Summary.    Do you feel unsteady when standing or walking?: (Patient-Rptd) Yes  Do you worry about falling?: (Patient-Rptd) Yes  Have you fallen in the past year?: (Patient-Rptd) No       Medicare Hearing Assessment:   Hearing Screening    Time taken: 4/28/2025  1:43 PM  Entry User: Heidy Garcia  Screening Method: Finger Rub  Finger Rub Result: Fail         Depression Screening (PHQ-2/PHQ-9): PHQ-2 SCORE: 0  , done 4/27/2025          Cognitive Assessment:   Abnormal  What day of the week is this?: Incorrect  What month is it?: Incorrect  What year is it?: Incorrect  Recall \"Ball\": Incorrect  Recall \"Flag\": Incorrect  Recall \"Tree\": Incorrect      Supplementary Documentation:     In the past six months, have you lost more than 10 pounds without trying?: (Patient-Rptd) 3 - Don't know  Has your appetite been poor?: (Patient-Rptd) No  Type of Diet: (Patient-Rptd) Low Salt  How would  you describe your daily physical activity?: (Patient-Rptd) None  How would you describe your current health state?: (Patient-Rptd) Poor  How do you maintain positive mental well-being?: Visiting Friends  On a scale of 0 to 10, with 0 being no pain and 10 being severe pain, what is your pain level?: (Patient-Rptd) 7 - (Severe)  In the past six months, have you experienced urine leakage?: (Patient-Rptd) 1-Yes  At any time do you feel concerned for the safety/well-being of yourself and/or your children, in your home or elsewhere?: (Patient-Rptd) No  Have you had any immunizations at another office such as Influenza, Hepatitis B, Tetanus, or Pneumococcal?: (Patient-Rptd) No    Visual Acuity:   Right Eye Visual Acuity: Uncorrected Right Eye Chart Acuity: 20/200   Left Eye Visual Acuity: Uncorrected Left Eye Chart Acuity: 20/200   Both Eyes Visual Acuity: Uncorrected Both Eyes Chart Acuity: 20/200   Able To Tolerate Visual Acuity: No        PHYSICAL EXAM:   BP 99/64 (BP Location: Left arm, Patient Position: Sitting, Cuff Size: large)   Pulse 76   Temp 97.9 °F (36.6 °C) (Temporal)   Ht 5' 5\" (1.651 m)   Wt 215 lb (97.5 kg)   SpO2 98%   BMI 35.78 kg/m²   BP Readings from Last 3 Encounters:   04/28/25 99/64   10/29/24 118/76   08/22/24 116/60     Wt Readings from Last 3 Encounters:   04/28/25 215 lb (97.5 kg)   10/29/24 214 lb (97.1 kg)   04/29/24 214 lb 9.6 oz (97.3 kg)      Body mass index is 35.78 kg/m².   Physical Exam  Vitals and nursing note reviewed.   Constitutional:       General: He is not in acute distress.     Appearance: Normal appearance. He is well-developed and well-groomed. He is not ill-appearing, toxic-appearing or diaphoretic.      Interventions: He is not intubated.  HENT:      Head: Normocephalic and atraumatic.      Right Ear: Hearing, tympanic membrane, ear canal and external ear normal. No decreased hearing noted. No laceration, drainage, swelling or tenderness. No middle ear effusion. There is  no impacted cerumen. No foreign body. No mastoid tenderness. No PE tube. No hemotympanum. Tympanic membrane is not injected, scarred, perforated, erythematous, retracted or bulging. Tympanic membrane has normal mobility.      Left Ear: Hearing, tympanic membrane, ear canal and external ear normal. No decreased hearing noted. No laceration, drainage, swelling or tenderness.  No middle ear effusion. There is no impacted cerumen. No foreign body. No mastoid tenderness. No PE tube. No hemotympanum. Tympanic membrane is not injected, scarred, perforated, erythematous, retracted or bulging. Tympanic membrane has normal mobility.      Nose:      Right Sinus: No maxillary sinus tenderness or frontal sinus tenderness.      Left Sinus: No maxillary sinus tenderness or frontal sinus tenderness.      Mouth/Throat:      Lips: Pink. No lesions.      Mouth: Mucous membranes are moist. No injury, lacerations, oral lesions or angioedema.      Dentition: No dental tenderness, gingival swelling, dental abscesses or gum lesions.      Tongue: No lesions. Tongue does not deviate from midline.      Palate: No mass and lesions.      Pharynx: No pharyngeal swelling, oropharyngeal exudate, posterior oropharyngeal erythema or uvula swelling.      Tonsils: No tonsillar exudate or tonsillar abscesses.   Eyes:      General: Lids are normal. Gaze aligned appropriately. No scleral icterus.        Right eye: No foreign body, discharge or hordeolum.         Left eye: No foreign body, discharge or hordeolum.      Extraocular Movements: Extraocular movements intact.      Right eye: Normal extraocular motion and no nystagmus.      Left eye: Normal extraocular motion and no nystagmus.      Conjunctiva/sclera: Conjunctivae normal.      Right eye: Right conjunctiva is not injected. No chemosis, exudate or hemorrhage.     Left eye: Left conjunctiva is not injected. No chemosis, exudate or hemorrhage.     Pupils: Pupils are equal, round, and reactive to  light.   Neck:      Thyroid: No thyroid mass, thyromegaly or thyroid tenderness.      Vascular: No carotid bruit or JVD.      Trachea: Trachea and phonation normal. No tracheal tenderness, tracheostomy, abnormal tracheal secretions or tracheal deviation.   Cardiovascular:      Rate and Rhythm: Normal rate and regular rhythm.      Pulses: Normal pulses.           Carotid pulses are 2+ on the right side and 2+ on the left side.       Radial pulses are 2+ on the right side and 2+ on the left side.        Dorsalis pedis pulses are 2+ on the right side and 2+ on the left side.        Posterior tibial pulses are 2+ on the right side and 2+ on the left side.      Heart sounds: Normal heart sounds, S1 normal and S2 normal.   Pulmonary:      Effort: Pulmonary effort is normal. No tachypnea, bradypnea, accessory muscle usage, prolonged expiration, respiratory distress or retractions. He is not intubated.      Breath sounds: Normal breath sounds and air entry. No stridor, decreased air movement or transmitted upper airway sounds. No decreased breath sounds, wheezing, rhonchi or rales.   Chest:      Chest wall: No tenderness.   Abdominal:      General: Bowel sounds are normal. There is no distension.      Palpations: Abdomen is soft. Abdomen is not rigid.      Tenderness: There is no abdominal tenderness. There is no right CVA tenderness, left CVA tenderness, guarding or rebound.   Genitourinary:     Comments:  exam deferred.  Musculoskeletal:      Cervical back: Neck supple.      Right lower le+ Pitting Edema present.      Left lower le+ Pitting Edema present.   Lymphadenopathy:      Head:      Right side of head: No submental, submandibular, preauricular, posterior auricular or occipital adenopathy.      Left side of head: No submental, submandibular, preauricular, posterior auricular or occipital adenopathy.      Cervical: No cervical adenopathy.      Right cervical: No superficial, deep or posterior cervical  adenopathy.     Left cervical: No superficial, deep or posterior cervical adenopathy.      Upper Body:      Right upper body: No supraclavicular adenopathy.      Left upper body: No supraclavicular adenopathy.   Skin:     General: Skin is warm and dry.      Coloration: Skin is not pale.      Findings: No erythema or rash.      Nails: There is no clubbing.   Neurological:      Mental Status: He is alert and oriented to person, place, and time.   Psychiatric:         Speech: Speech normal.         Behavior: Behavior normal. Behavior is cooperative.              ASSESSMENT/PLAN:     Encounter Diagnoses   Name Primary?    Age-related nuclear cataract of both eyes Stable Fu optho.    Yes    Stage 3a chronic kidney disease (HCC) No motrin, ibuprofen, advil, alleve, naprosyn  with these medications.  Hydrate at least 48 oz. water a day.        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        Diverticulum of bladder Stable.        Essential hypertension, benign Lower. STOP losartan and call in 2 weeks with blood pressures.  Careful with diet and excercise at least 30 minutes 3-4 times a week. Check blood pressures at different times on different days. Can purchase own blood pressure monitor. If not, check at local pharmacy. Bake foods more and grill occasionally. Avoid fried foods. No salt. Use other seasonings.         Mixed hyperlipidemia Check blood.        Acquired hypothyroidism Stable.           Vaccine counseling Up to date.        Vitamin B12 deficiency Higher. Check blood 7-25.        Vitamin D deficiency higher. Check blood 7-25.        Chronic cough Check on xyzal 5 mg at night. Elevate head of bed.        Other constipation Try benefiber 1 tsb. a day and miralax every other day.       Cold feeling. STOP losartan. Call in 2 week. Check blood.     Sleepiness. Check urine. Decrease gabapentin to 200 mg at night. Check blood. STOP losartan.     Orders Placed This Encounter   Procedures    Lipid  Panel    Comp Metabolic Panel (14)    URINALYSIS, AUTO, W/O SCOPE    CBC With Differential With Platelet    Vitamin B12    Vitamin D       Meds This Visit:  Requested Prescriptions      No prescriptions requested or ordered in this encounter       Imaging & Referrals:  None      RTC 3 months for FU HTN.     Full H&P and assessment and plan done with daughter present with patient permission.    1. Age-related nuclear cataract of both eyes (Primary)  2. Stage 3a chronic kidney disease (HCC)  -     URINALYSIS, AUTO, W/O SCOPE  3. 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 Diverticulum of bladder  5. Essential hypertension, benign  6. Mixed hyperlipidemia  -     Lipid Panel; Future; Expected date: 04/30/2025  -     Comp Metabolic Panel (14); Future; Expected date: 04/30/2025  7. Acquired hypothyroidism  8. Memory loss  Overview:  Possible combination of Alzheimer's and vascular.  9. Vaccine counseling  10. Vitamin B12 deficiency  -     Vitamin B12; Future; Expected date: 07/28/2025  11. Vitamin D deficiency  -     Vitamin D; Future; Expected date: 07/28/2025  12. Chronic cough  13. Other constipation  14. Sensation of feeling cold  -     CBC With Differential With Platelet; Future; Expected date: 04/30/2025      The patient indicates understanding of these issues and agrees to the plan.  Consult ordered.  Further testing ordered.  Imaging studies ordered.  Lab work ordered.  Reinforced healthy diet, lifestyle, and exercise.      Return in about 3 months (around 7/28/2025), or if symptoms worsen or fail to improve.    Advanced Directives:   He has a Living Will on file in [facility]; reviewed and discussed documents with patient (and family/surrogate if present).  He does have a POA but we do NOT have it on file in Epic.    Discussed Advance Care Planning with patient (and family/surrogate if present). Standard forms made available to patient in After Visit Summary.  16+ minutes was spent with all Advanced Care  Planning elements today (up to 30 minutes for CPT 23078)    MD Audie Schultz's SCREENING SCHEDULE   Tests on this list are recommended by your physician but may not be covered, or covered at this frequency, by your insurer.   Please check with your insurance carrier before scheduling to verify coverage.   PREVENTATIVE SERVICES FREQUENCY &  COVERAGE DETAILS LAST COMPLETION DATE   Diabetes Screening    Fasting Blood Sugar / Glucose    One screening every 12 months if never tested or if previously tested but not diagnosed with pre-diabetes   One screening every 6 months if diagnosed with pre-diabetes Lab Results   Component Value Date     (H) 05/17/2024        Cardiovascular Disease Screening    Lipid Panel  Cholesterol  Lipoprotein (HDL)  Triglycerides Covered every 5 years for all Medicare beneficiaries without apparent signs or symptoms of cardiovascular disease Lab Results   Component Value Date    CHOLEST 117 03/29/2024    HDL 40 03/29/2024    LDL 52 03/29/2024    TRIG 146 03/29/2024         Electrocardiogram (EKG)   Covered if needed at Welcome to Medicare, and non-screening if indicated for medical reasons 06/21/2023      Ultrasound Screening for Abdominal Aortic Aneurysm (AAA) Covered once in a lifetime for one of the following risk factors    Men who are 65-75 years old and have ever smoked    Anyone with a family history -     Colorectal Cancer Screening  Covered for ages 50-85; only need ONE of the following:    Colonoscopy   Covered every 10 years    Covered every 2 years if patient is at high risk or previous colonoscopy was abnormal -    No recommendations at this time    Flexible Sigmoidoscopy   Covered every 4 years -    Fecal Occult Blood Test Covered annually -   Prostate Cancer Screening    Prostate-Specific Antigen (PSA) Annually Lab Results   Component Value Date    PSA 3.30 07/01/2024     Health Maintenance   Topic Date Due    PSA  07/01/2025      Immunizations     Influenza Covered once per flu season  Please get every year 10/17/2024  No recommendations at this time    Pneumococcal Each vaccine ([id number] & [id number]) covered once after 65 Prevnar 13: 10/01/2019    Zraquhnqp39: 07/08/2021     No recommendations at this time    Hepatitis B One screening covered for patients with certain risk factors   -  No recommendations at this time    Tetanus Toxoid Not covered by Medicare Part B unless medically necessary (cut with metal); may be covered with your pharmacy prescription benefits -    Tetanus, Diptheria and Pertusis TD and TDaP Not covered by Medicare Part B -  No recommendations at this time    Zoster Not covered by Medicare Part B; may be covered with your pharmacy  prescription benefits -  No recommendations at this time     Annual Monitoring of Persistent Medications (ACE/ARB, digoxin diuretics, anticonvulsants)    Potassium Annually Lab Results   Component Value Date    K 4.5 05/17/2024         Creatinine   Annually Lab Results   Component Value Date    CREATSERUM 1.27 05/17/2024         BUN Annually Lab Results   Component Value Date    BUN 16 05/17/2024       Drug Serum Conc Annually No results found for: \"DIGOXIN\", \"DIG\", \"VALP\"                  Understanding Advance Care Planning  Advance care planning is the process of deciding one’s own future medical care. It helps assure that if you can’t speak for yourself, your wishes can still be carried out. The plan is a series of legal documents that note a person’s wishes. The documents vary by state. Advance care planning should be discussed at a regular office visit with your primary care provider before an acute illness. Advance care planning is encouraged when a person has a serious illness that is expected to get worse. It may also be done before major surgery. And it can help you and your family be prepared in case of a major illness or injury. Advance care planning helps with making decisions at these times.      Who will speak on your behalf?  A healthcare proxy is a person who acts as the voice of a patient when the patient can’t speak for himself or herself. The name of this role varies by state. It may be called a Durable Medical Power of  or Durable Power of  for Healthcare. It may be called an agent, surrogate, or advocate. Or it may be called a representative or decision maker. It's an official duty that is identified by a legal document. The document also varies by state.   Why is advance care planning important?   If a person communicates his or her healthcare wishes:   He or she will be given medical care that matches his or her values and goals.  Family members will not be forced to make decisions in a crisis with no guidance.  Creating a plan  Making an advance care plan is often done in 3 steps:   Thinking about one’s wishes. To create an advance care plan, think about what kind of medical treatment you would want if you lose the ability to communicate. Are there any situations in which you would refuse or stop treatment? Are there therapies you would want or not want? And whom do you want to make decisions for you? There are many places to learn more about how to plan for your care. Ask your healthcare provider or  for resources.  Picking a healthcare proxy. This means choosing a trusted person to speak for you only when you can’t speak for yourself. When you can't make medical decisions, your proxy makes sure the instructions in your advance care plan are followed. A proxy doesn't make decisions based on his or her own opinions. They must put aside those opinions and values if needed, and carry out your wishes.  Filling out the legal documents. There are several kinds of legal documents for advance care planning. Each one tells healthcare providers your wishes. The documents may vary by state. They must be signed and may need to be witnessed or notarized. You can cancel or  change them whenever you wish. Depending on your state, the documents may include a Healthcare Proxy form, Living Will, Durable Medical Power of , and Advance Directive.  The family’s role  The best help a family can give is to support their loved one’s wishes. Open and honest communication is vital. Family should express any concerns they have about the patient’s choices while the patient can still make decisions in the event that his or her illness prevents communicating those wishes at a later time.    [employer] last reviewed this educational content on 12/1/2019 © 2000-2021 [employer]. All rights reserved. This information is not intended as a substitute for professional medical care. Always follow your healthcare professional's instructions.          Advance Medical Directive  An advance medical directive is a form that lets you plan ahead for the care you’d want if you could no longer express your wishes. This statement outlines the medical treatment you’d want or names the person you’d wish to make healthcare decisions for you. Be aware that laws vary from state to state, and it may be worthwhile to talk with an .     Writing down your wishes  An advance directive is important whether you’re young or old. Injury or illness can strike at any age.  Decide what is important to you and the kind of treatment you’d want, or not want to have.  Some states allow only one kind of advance directive. Some let you do both a durable power of  for healthcare and a living will. Some states put both kinds on the same form.    A durable power of  (POA) for healthcare   This form lets you name someone else that you have chosen and trust to speak and make decisions on your behalf.  This person can decide on treatment for you only when you can’t speak for yourself.  You don't need to be at the end of your life. They could speak for you if you were in a coma but were likely to  recover.    A living will  This form lets you list the care you want at the end of your life.  A living will applies only if you won’t live without medical treatment. It would apply if you had advanced cancer, a massive stroke, or other serious illness from which you will not recover.  It takes effect only when you can no longer express your wishes yourself.    [employer] last reviewed this educational content on 2/1/2021 © 2000-2021 [employer]. All rights reserved. This information is not intended as a substitute for professional medical care. Always follow your healthcare professional's instructions.          Choosing an Agent  A durable power of  for healthcare is only as good as the person you name to be your agent. Your agent is the person you have chosen to speak and make decisions on your behalf. If this person knows your treatment wishes and is willing to carry them out, you’ll probably be well represented. Be sure to tell your agent what’s important to you.     Who to choose  Here are suggestions for choosing an agent:  You can name a family member, close friend, , , or rabbi.  You should name one person as your agent. Then name one or two alternates. You need a backup person in case your first choice can’t be reached when needed.  Talk with each person you're thinking of naming as your agent or alternate. Do this before you decide who should carry out your wishes.  Your agent should be ...  A competent adult, age 18 or older  Someone you trust and can talk to about the care you want and what's important to you  Someone who supports your treatment choices    In many states, your agent can’t be ...   Your healthcare provider  An employee of your provider or of a hospital, nursing home, or hospice program where you receive care  Some states have other restrictions on who can be named as an agent for an advance directive.   Be prepared  Tip: It's a good idea to write down  your wishes and give a copy to your agent and all others who are involved with your healthcare.   Jaimee last reviewed this educational content on 8/1/2021 © 2000-2021 [employer]. All rights reserved. This information is not intended as a substitute for professional medical care. Always follow your healthcare professional's instructions.          Understanding DNR Orders  Do not resuscitate (DNR) orders tell hospital staff not to do potentially life-restoring measures, such as CPR, if you or your loved one's heart and lungs stop working. It allows a natural death, and prevents healthcare staff from artificially reviving a person and placing them on life support. In many states, a DNR order also applies to staff outside the hospital such as in nursing homes and emergency medical services. A DNR order must be written by a healthcare provider. Or in some cases, certain other healthcare workers write it. This can only be done with the person’s or family’s consent. If a person has not written an advance directive, their family will decide on a DNR with the help of the healthcare team.   The person can cancel a DNR order at any time. The healthcare team can answer questions about the DNR form. Have copies of a DNR form are readily available so that your wishes can be faithfully followed.   Writing a DNR order  When might a DNR order be written? When the person’s health condition is such that, in the case of cardiac arrest, CPR and other resuscitation methods are not desired. This could be because the chance of successful resuscitation is very low. Or it could be because the care plan now focuses on comfort measures instead of life-sustaining measures. Coma and terminal illness are instances when a DNR order might be used.   Irreversible coma  In a coma, a person does not respond to sight, sound, or touch. The heart and lungs could be working, but brain function is damaged due to trauma or disease.    Terminal illness  In the last stages of heart disease, AIDS, cancer, and other illnesses, some people don’t want to prolong their suffering. If recovery isn’t likely and quality of life is poor or getting worse, a person or their family may agree to a DNR order.   DNR orders and hospice care  A hospice program can offer care during the final weeks of life. Hospice programs provide dignity, pain control and comfort care in the home or at special facilities. Hospice does not provide aggressive treatment. In fact, a DNR order will likely be discussed before a person is admitted to hospice. A  or  may be able to help you arrange for hospice support.   Documenting end-of-life wishes  In addition to DNR orders, a person with a serious, life-limiting illness may wish to document their treatment wishes. This is called a POST form or by different names, depending on the state. It's meant to provide a portable medical order to help guide healthcare providers on the specific medical treatments a person wants during a medical emergency. It's meant to complement a DNR order, not replace it. Your healthcare provider can tell you more and help you complete the forms. The form may be called one of these:   MOLST (medical orders for life-sustaining treatment)  POLST (physician orders for life-sustaining treatment)  MOST (medical orders for scope of treatment)  POST (physician orders for scope of treatment)  TPOPP (transportable physician orders for patient preferences)  Jaimee last reviewed this educational content on 7/1/2021    © 9927-9262 [employer]. All rights reserved. This information is not intended as a substitute for professional medical care. Always follow your healthcare professional's instructions.          An Agent’s Role for Durable Power of  for Health Care   It’s impossible to know which medical treatment choices you might face in the future. What if you aren't able  to make these decisions for yourself? A durable power of  for healthcare lets you name an agent to speak and carry out your wishes on your behalf. This happens only if you can’t express your wishes yourself.     An agent’s duty  Your agent respects your wishes in the following ways:    Your agent’s duty is to see that your wishes are followed.  If your wishes aren’t known, your agent should try to decide what you want.  Your agent’s choices come before anyone else’s wishes for you.  A durable power of  for healthcare doesn't not give your agent control over your money . Your agent also can’t be made to pay your bills.  Find out what your agent can do  Restrictions on what an agent can and can’t do vary by state. Check your state laws. In most states your agent can:   Choose or refuse life-sustaining and other medical treatment on your behalf  Consent to treatment, and then stop treatment if your condition doesn’t improve  Access and release your medical records  Request an autopsy and donate your organs, unless you’ve stated otherwise in your advance directive  Find out whether your state allows your agent to do the following:   Refuse or withdraw life-enhancing care  Refuse or stop tube feeding or other life-sustaining care--even if you haven’t stated on your advance directive that you don’t want these treatments  Order sterilization or   Jaimee last reviewed this educational content on 2021 [employer]. All rights reserved. This information is not intended as a substitute for professional medical care. Always follow your healthcare professional's instructions.          Being a Healthcare Proxy  A healthcare proxy is someone who represents a person who can’t speak for themself. The name of this role varies by state. It may be called a Durable Medical Power of . It may be called a Durable Power of  for Healthcare. It may be called an agent,  surrogate, or advocate. Or it may be called a representative or decision maker. It's an official duty that is noted by a legal document. The document also varies by state. The person must name you as his or her proxy on the document.      A healthcare proxy speaks for another person when he or she is not able to do so. The proxy helps make sure the person’s healthcare wishes are known and followed.     What it means to be a healthcare proxy   Your role as healthcare proxy starts when the person can’t make medical decisions. This assessment can only be made by a licensed doctor. You then make the healthcare decisions as needed. You do this by carrying out the person’s wishes. These wishes are noted in his or her advance care planning documents. These declare what kind of treatment the person wishes to have or not have. You may need to put aside your own values and opinions to carry out the person’s wishes. This may include refusing or stopping life-sustaining treatments.   Documenting end-of-life wishes   As a healthcare proxy, encourage the person to discuss his or her wishes, while they are able. They can do this with their healthcare provider and then document the wishes as a medical order. The provider can help the person complete the form. The forms are known by different names depending on the state. The form may be called one of these:     MOLST (medical orders for life-sustaining treatment)  POLST (physician orders for life-sustaining treatment)  MOST (medical orders for scope of treatment)  POST (physician orders for scope of treatment)  TPOPP (transportable physician orders for patient preferences)    The form documents the person’s wishes at the end of life. It's not tied to a certain healthcare provider or facility. It's different than a living will. The form is an order written according to state regulations by a healthcare provider. To complete one, the person must express his or her wishes to an  advanced healthcare provider. If the person can’t make his or her own decisions, then this is done by the person’s healthcare proxy.   Carrying out your role  Your duties depend on what the person’s advance care planning documents say. Your duties may also depend on state law. In general:   Before accepting a role as a proxy, talk with the person. Be sure you know his or her wishes. Ask questions. This will help you be his or her voice if and when it is needed.  Be sure that the person’s healthcare team knows that you are his or her proxy. Carry a copy of the document and proof of your identity.  Make sure the healthcare team has a copy of the advance care planning documents.  Talk to the healthcare team. Ask questions as often as you need. Stay informed about the person’s condition.  Ask for any help you need to understand the medical situation. Ask about the person’s condition and prognosis. Ask about risks and benefits of tests and treatments. Find out all the facts and options.  Speak on the person’s behalf with the healthcare team when needed.  Talk with family members and keep them informed.  Know your rights. You have the right to ask for information. You can ask for consultations and second opinions. You have the right to request or refuse treatment for the person. You may be able to review his or her medical chart. You can authorize the person’s transfer to another facility. You can also request a new healthcare provider for him or her. If you are not sure what your rights are at any time, ask a .  When it’s time to make decisions  If the person’s wishes are clear in the advance care plan documents, ask for them to be carried out as noted. If they are not clear, talk with the healthcare team. Listen to the team’s recommendations. Talk with a  or counselor. It may be hard for you to make a decision at times. You may feel sad or upset about a decision. Being a healthcare proxy is  not an easy role. But it's an important one. Remember that the person trusts you to carry out his or her wishes.   If you need help  Ask the healthcare team if you have trouble with a decision. The healthcare team will help you.  Encourage the person you are helping to have a conversation with their provider about their end-of-life wishes. The provider can help them fill out the form.  You may need help in resolving family conflicts. Ask the hospital or clinic , ethics consultant, or a  for help.  If you are having trouble talking with the healthcare team while the person is in the hospital, reach out to the patient relations department. Or ask to speak to the hospital ombudsman or ethics committee.    [employer] last reviewed this educational content on 9/1/2019 © 2000-2021 [employer]. All rights reserved. This information is not intended as a substitute for professional medical care. Always follow your healthcare professional's instructions.          Life Support  If you understand how specific treatments may affect your quality of life, you can decide which ones you’d choose or refuse. You may want to talk to your healthcare provider about the possible benefits and risks of treatments. The chance of good results from these therapies varies based on each individual clinical situation and can be very hard to predict. Medical treatment, if your life is in danger, falls into 3 main categories.     Life supporting  This care keeps your heart and lungs going when they can no longer work on their own.  CPR restarts your heart and lungs if they stop working.  A respirator (or ventilator) keeps you breathing. Air is pumped into your lungs through a tube that’s put into your windpipe.    Life sustaining  This care keeps you alive longer when you have an illness that can’t be cured.  Tube feeding or TPN (total parenteral nutrition) provides food and fluids through a tube or IV  (intravenous). It is given if you can’t chew or swallow on your own.  Dialysis is a kidney machine that cleans your blood when your kidneys can no longer work on their own.    Life enhancing  This care controls pain and discomfort, such as nausea or trouble breathing. This type of care is not designed to prolong your life, but to enhance comfort and quality of life. Nothing is done to keep you alive longer.  Hospice care is comfort care. It might provide food and fluids by mouth or help with bathing. Hospice care is given during the last stages of a terminal illness.  Strong pain medicine can be given to help keep you comfortable.    Do Not Resuscitate (DNR)  Would you want CPR if your heart stops while you’re a patient in a hospital or nursing home? If not, talk to your healthcare provider about issuing a DNR (Do-Not-Resuscitate) order.   DNRs and advance directives may not apply during anesthesia, in emergency rooms, or when emergency medical teams respond to a 911 call. Ask your healthcare provider how you can make sure your wishes will be followed. Also, a DNR will not prevent you from getting other kinds of needed medical care such as treatment for pain, or bleeding.   [employer] last reviewed this educational content on 8/1/2021 © 2000-2021 [employer]. All rights reserved. This information is not intended as a substitute for professional medical care. Always follow your healthcare professional's instructions.          Stopping Life-Sustaining Treatments  Certain treatments can help sustain life when you have a serious illness. But as your illness progresses, there may come a time when these treatments are no longer a benefit. Decisions must then be made whether to continue or stop these treatments. This can be a hard task for you and your loved ones, but know that you’re not alone. Your healthcare provider and healthcare team will guide you through these treatment decisions. They will also help  answer any questions you may have.     What are life-sustaining treatments?  Life-sustaining treatments help keep you alive if a vital body function fails. These treatments can include CPR and the use of machines to help with heart, lung, or kidney function. They can also include the use of tubes to deliver food, fluids, blood, and medicines to the body. Blood transfusions and antibiotics are also types of life-sustaining treatments.   What happens if life-sustaining treatments are continued?  These treatments can help extend your life. But they will not cure your illness. If you are near the end of your life, you may find it hard to handle the side effects and problems that can occur with these treatments. In this case, the treatments may be too much of a burden on your body. They may cause more harm than good. They may also disrupt the natural dying process and prolong suffering.   What happens if life-sustaining treatments are stopped?  If these treatments are stopped, the focus of treatment will shift to comfort care. This involves measures to control pain and other symptoms you may have. These measures are not meant to cure your illness or help you live longer. Instead, they are meant to improve your quality of life during the time you have left. If you are in the end stage of your illness, you may be referred to hospice by your healthcare provider. Hospice provides end-of-life care. This includes emotional, spiritual, and social support for you and your loved ones.   How do I decide whether to stop life-sustaining treatments?  Your healthcare provider and healthcare team will talk with you about the specific treatments that are part of your care plan. If you want, you may include family and friends in these meetings. Here are some questions to think about or ask your healthcare team:   Is there is a chance that my illness will improve? Or will it continue to get worse?  What are my goals of care? Do I want to  extend the time I have left? Or do I want to focus my care on comfort and managing symptoms?  How will stopping or continuing these treatments affect my health? Will they enhance my comfort and quality of life? Or will they cause more problems?  Consider your own values or sami. Also ask for advice from those who share your values.  How do I state my decisions about life-sustaining treatments?  Once you’ve made your decision to continue or stop specific treatments, you can tell your healthcare provider directly. It's best to also put your treatment wishes in writing with advance directives. These are legal forms related to healthcare decisions. Laws about advance directives vary from state to state. Ask your healthcare provider about what forms are needed to make sure your wishes will be followed. Some common forms include:   A durable power of  for healthcare or a healthcare proxy form.  This form lets you name a person to make treatment decisions for you when you can’t. This person is often called a healthcare proxy, medical or healthcare power of , or agent.  A living will.  This form tells others the kinds of treatment you want or don’t want if you become too ill or injured to speak for yourself.  Orders for life-sustaining treatments.  These are actual healthcare provider's orders that must be followed by other medical providers. The form belongs to you, not to the healthcare provider or hospital.). These are legal forms obtained from your healthcare provider or hospital that document your wishes. The forms are known by different names depending on the state. Common names include:  MOLST (medical orders for life-sustaining treatment)  POLST (physician orders for life-sustaining treatment)  MOST (medical orders for scope of treatment)  POST (physician orders for scope of treatment)  TPOPP (transportable physician orders for patient preferences)     Keep in mind that you can change or cancel an  advance directive at any time. Make it a practice to review your decisions each time there is a change in your health or goals of care. Also be sure to tell your healthcare proxy and loved ones of any changes in your decisions.   Deciding whether to stop life-sustaining treatments for a loved one   The decision to stop treatment ideally is made with the person’s consent. If the person is not capable of making decisions and has no advance directive, the decision falls to the person’s healthcare proxy or other adult. If you need to make a decision about stopping treatment for a loved one, start by talking to his or her healthcare provider. Review the goals of care and the benefits and burdens of specific treatments on your loved one’s health. Also think about your loved one’s wishes and values. If needed, seek advice from other healthcare team members, like a  or .   [employer] last reviewed this educational content on 12/1/2019 © 2000-2021 [employer]. All rights reserved. This information is not intended as a substitute for professional medical care. Always follow your healthcare professional's instructions.         [1]   Allergies  Allergen Reactions    Pcn [Penicillins] NAUSEA AND VOMITING    Seasonal OTHER (SEE COMMENTS)     Watery eyes, coughing, sneezing     [2]   Current Outpatient Medications   Medication Sig Dispense Refill    triamcinolone 0.1 % External Cream Apply 1 Application topically 2 (two) times daily. 80 g 5    TRAMADOL 50 MG Oral Tab TAKE 1.5 TABLETS (75 MG TOTAL) BY MOUTH NIGHTLY AS NEEDED FOR PAIN. 45 tablet 0    polyethylene glycol, PEG 3350, 17 GM/SCOOP Oral Powder Take 17 g by mouth daily. 238 g 0    fluticasone propionate 50 MCG/ACT Nasal Suspension 2 sprays by Nasal route daily. 48 g 3    finasteride 5 MG Oral Tab Take 1 tablet (5 mg total) by mouth daily. 90 tablet 3    tacrolimus 0.1 % External Ointment Use twice daily to affected areas of face  60 g 11    donepezil 10 MG Oral Tab Take 1 tablet (10 mg total) by mouth nightly.      omega-3 fatty acids 1000 MG Oral Cap Take 2,000 mg by mouth daily.      gabapentin 100 MG Oral Cap Take 3 capsules (300 mg total) by mouth nightly. 270 capsule 3    simvastatin 20 MG Oral Tab Take 1 tablet (20 mg total) by mouth nightly. 90 tablet 3    losartan 25 MG Oral Tab Take 1 tablet (25 mg total) by mouth daily. 90 tablet 3    tamsulosin 0.4 MG Oral Cap Take 1 capsule (0.4 mg total) by mouth daily. 90 capsule 3    levocetirizine 5 MG Oral Tab Take 1 tablet (5 mg total) by mouth every evening. 90 tablet 3    omeprazole 20 MG Oral Capsule Delayed Release Take 1 capsule (20 mg total) by mouth daily. 90 capsule 3    Memantine HCl ER 28 MG Oral Capsule SR 24 Hr Take 1 capsule (28 mg total) by mouth daily.      QUEtiapine Fumarate 25 MG Oral Tab TAKE 1 TABLET BY MOUTH AT BEDTIME CAN GIVE ADDITIONAL TABLET AS NEEDED FOR AGITATION 180 tablet 0    aspirin 81 MG Oral Tab Take 1 tablet (81 mg total) by mouth daily.      acetaminophen (TYLENOL EXTRA STRENGTH) 500 MG Oral Tab Take 1 tablet (500 mg total) by mouth every 6 (six) hours as needed for Pain.      Vitamin B-12 (VITAMIN B12) 1000 MCG Oral Tab Take 1 tablet (1,000 mcg total) by mouth daily.     [3]   Past Medical History:   Acid reflux    Allergic rhinitis    Anxiety    Arthritis    Cataract    OU    Dementia (HCC)    Depression    Dermatochalasis of both eyelids    OU    Elevated PSA    Negative biopsies    Elevated PSA    and free PSA at 19%-Negative biopsies    Essential hypertension    Floaters    OD    Foot drop    w/ weakness    Obesity    Other and unspecified hyperlipidemia    Pinguecula of both eyes    OU    Stomach ulcer    per: NG-bleeding    Traumatic brain injury (HCC)   [4]   Past Surgical History:  Procedure Laterality Date    Back surgery      2 back surgeries    Colonoscopy  2000,2007    per: NG    Colonoscopy      Other surgical history  2000, 2003    x2  negative prostate biopsies   [5]   Family History  Problem Relation Age of Onset    Cancer Sister         metastatic cancer (brain)    Heart Disease Father 84        CAD-(cause of death)    Cancer Sister 37        bone cancer (cause of death)    Other (Other) Other         No vascular disease    Asthma Daughter     Obesity Daughter     Glaucoma Neg     Diabetes Neg     Macular degeneration Neg    [6]   Social History  Socioeconomic History    Marital status:    Tobacco Use    Smoking status: Former     Current packs/day: 0.00     Types: Cigarettes     Quit date: 1955     Years since quittin.3    Smokeless tobacco: Never   Vaping Use    Vaping status: Never Used   Substance and Sexual Activity    Alcohol use: No    Drug use: No   Other Topics Concern    Caffeine Concern Yes     Comment: decaf coffee,tea-1 cup/day    Exercise No    Reaction to local anesthetic No    Pt has a pacemaker No    Pt has a defibrillator No   [7]   Patient Active Problem List  Diagnosis    Memory loss    Dementia (HCC)    Hypothyroidism    Age-related nuclear cataract of both eyes    Essential hypertension, benign    Hyperlipidemia    Diverticulum of bladder    Vaccine counseling    CKD (chronic kidney disease) stage 3, GFR 30-59 ml/min ([facility])    Vitamin D deficiency    Vitamin B12 deficiency   [8]   Social History  Tobacco Use   Smoking Status Former    Current packs/day: 0.00    Types: Cigarettes    Quit date: 1955    Years since quittin.3   Smokeless Tobacco Never

## 2025-05-07 ENCOUNTER — LAB ENCOUNTER (OUTPATIENT)
Dept: LAB | Age: OVER 89
End: 2025-05-07
Attending: INTERNAL MEDICINE
Payer: MEDICARE

## 2025-05-07 DIAGNOSIS — E78.2 MIXED HYPERLIPIDEMIA: ICD-10-CM

## 2025-05-07 DIAGNOSIS — R68.89 SENSATION OF FEELING COLD: ICD-10-CM

## 2025-05-07 LAB
ALBUMIN SERPL-MCNC: 3.7 G/DL (ref 3.2–4.8)
ALBUMIN/GLOB SERPL: 1.2 {RATIO} (ref 1–2)
ALP LIVER SERPL-CCNC: 65 U/L (ref 45–117)
ALT SERPL-CCNC: 15 U/L (ref 10–49)
ANION GAP SERPL CALC-SCNC: 6 MMOL/L (ref 0–18)
AST SERPL-CCNC: 14 U/L (ref ?–34)
BASOPHILS # BLD AUTO: 0.03 X10(3) UL (ref 0–0.2)
BASOPHILS NFR BLD AUTO: 0.4 %
BILIRUB SERPL-MCNC: 0.5 MG/DL (ref 0.2–0.9)
BUN BLD-MCNC: 14 MG/DL (ref 9–23)
BUN/CREAT SERPL: 11.1 (ref 10–20)
CALCIUM BLD-MCNC: 8.7 MG/DL (ref 8.7–10.4)
CHLORIDE SERPL-SCNC: 106 MMOL/L (ref 98–112)
CHOLEST SERPL-MCNC: 121 MG/DL (ref ?–200)
CO2 SERPL-SCNC: 29 MMOL/L (ref 21–32)
CREAT BLD-MCNC: 1.26 MG/DL (ref 0.7–1.3)
DEPRECATED RDW RBC AUTO: 42.8 FL (ref 35.1–46.3)
EGFRCR SERPLBLD CKD-EPI 2021: 53 ML/MIN/1.73M2 (ref 60–?)
EOSINOPHIL # BLD AUTO: 0.54 X10(3) UL (ref 0–0.7)
EOSINOPHIL NFR BLD AUTO: 7.3 %
ERYTHROCYTE [DISTWIDTH] IN BLOOD BY AUTOMATED COUNT: 13 % (ref 11–15)
FASTING PATIENT LIPID ANSWER: YES
FASTING STATUS PATIENT QL REPORTED: YES
GLOBULIN PLAS-MCNC: 3 G/DL (ref 2–3.5)
GLUCOSE BLD-MCNC: 91 MG/DL (ref 70–99)
HCT VFR BLD AUTO: 41.9 % (ref 39–53)
HDLC SERPL-MCNC: 46 MG/DL (ref 40–59)
HGB BLD-MCNC: 13.7 G/DL (ref 13–17.5)
IMM GRANULOCYTES # BLD AUTO: 0.02 X10(3) UL (ref 0–1)
IMM GRANULOCYTES NFR BLD: 0.3 %
LDLC SERPL CALC-MCNC: 53 MG/DL (ref ?–100)
LYMPHOCYTES # BLD AUTO: 2.47 X10(3) UL (ref 1–4)
LYMPHOCYTES NFR BLD AUTO: 33.3 %
MCH RBC QN AUTO: 29.4 PG (ref 26–34)
MCHC RBC AUTO-ENTMCNC: 32.7 G/DL (ref 31–37)
MCV RBC AUTO: 89.9 FL (ref 80–100)
MONOCYTES # BLD AUTO: 0.88 X10(3) UL (ref 0.1–1)
MONOCYTES NFR BLD AUTO: 11.9 %
NEUTROPHILS # BLD AUTO: 3.47 X10 (3) UL (ref 1.5–7.7)
NEUTROPHILS # BLD AUTO: 3.47 X10(3) UL (ref 1.5–7.7)
NEUTROPHILS NFR BLD AUTO: 46.8 %
NONHDLC SERPL-MCNC: 75 MG/DL (ref ?–130)
OSMOLALITY SERPL CALC.SUM OF ELEC: 292 MOSM/KG (ref 275–295)
PLATELET # BLD AUTO: 184 10(3)UL (ref 150–450)
POTASSIUM SERPL-SCNC: 3.8 MMOL/L (ref 3.5–5.1)
PROT SERPL-MCNC: 6.7 G/DL (ref 5.7–8.2)
RBC # BLD AUTO: 4.66 X10(6)UL (ref 3.8–5.8)
SODIUM SERPL-SCNC: 141 MMOL/L (ref 136–145)
TRIGL SERPL-MCNC: 123 MG/DL (ref 30–149)
VLDLC SERPL CALC-MCNC: 18 MG/DL (ref 0–30)
WBC # BLD AUTO: 7.4 X10(3) UL (ref 4–11)

## 2025-05-07 PROCEDURE — 85025 COMPLETE CBC W/AUTO DIFF WBC: CPT

## 2025-05-07 PROCEDURE — 80053 COMPREHEN METABOLIC PANEL: CPT

## 2025-05-07 PROCEDURE — 80061 LIPID PANEL: CPT

## 2025-05-07 PROCEDURE — 36415 COLL VENOUS BLD VENIPUNCTURE: CPT

## 2025-05-07 RX ORDER — LEVOCETIRIZINE DIHYDROCHLORIDE 5 MG/1
5 TABLET, FILM COATED ORAL EVERY EVENING
Qty: 90 TABLET | Refills: 3 | Status: SHIPPED | OUTPATIENT
Start: 2025-05-07

## 2025-05-07 NOTE — PROGRESS NOTES
CBC Normal (white blood cells and red blood cells and platelets),   CMP Normal (electrolytes, sugar, and liver functions), decline in kidney function is stable but still declined. No motrin, ibuprofen, advil, alleve, naprosyn  with these medications.  Hydrate at least 48 ounces of water a day.  Lipid (choilesterol) is good,

## 2025-05-07 NOTE — TELEPHONE ENCOUNTER
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    [facility]    [facility]    6 months ago Stage 3a chronic kidney disease (HCC)    [facility]    [facility] Visit    7 months ago Chronic bilateral low back pain without sciatica    [facility] Terrence Mcgregor DO    Office Visit    8 months ago History of COVID-19    [facility] Randy Pryor MD    Office Visit          Future Appointments         Provider Department Appt Notes    In 1 month Vanessa Costa MD [facility] follow up    In 2 months Dacia Oseguera MD [facility]                     Passed - Medication is active on med list

## 2025-05-09 DIAGNOSIS — G89.29 CHRONIC BILATERAL LOW BACK PAIN WITHOUT SCIATICA: ICD-10-CM

## 2025-05-09 DIAGNOSIS — M54.50 CHRONIC BILATERAL LOW BACK PAIN WITHOUT SCIATICA: ICD-10-CM

## 2025-05-09 NOTE — TELEPHONE ENCOUNTER
Refill Request    Medication request: TRAMADOL 50 MG Oral Tab TAKE 1.5 TABLETS (75 MG TOTAL) BY MOUTH NIGHTLY AS NEEDED FOR PAIN.     LOV:10/3/2024 Terrence Mcgregor DO   Due back to clinic per last office note:  \"F/u in 4-6 weeks. \"  NOV: Visit date not found      [place]: 4/12/25 #45 - 30 day supply    Urine drug screen (if applicable): n/a  Pain contract: n/a    LOV plan (if weaning or changing medications): Per  at last office visit: \"May increase tramadol to 75mg at bedtime. \"

## 2025-05-10 DIAGNOSIS — R41.3 MEMORY LOSS: ICD-10-CM

## 2025-05-10 DIAGNOSIS — F03.90 DEMENTIA (HCC): ICD-10-CM

## 2025-05-10 DIAGNOSIS — E03.9 HYPOTHYROIDISM: ICD-10-CM

## 2025-05-11 RX ORDER — TRAMADOL HYDROCHLORIDE 50 MG/1
75 TABLET ORAL NIGHTLY PRN
Qty: 45 TABLET | Refills: 0 | Status: SHIPPED | OUTPATIENT
Start: 2025-05-12

## 2025-05-12 RX ORDER — OMEPRAZOLE 20 MG/1
20 CAPSULE, DELAYED RELEASE ORAL DAILY
Qty: 90 CAPSULE | Refills: 3 | Status: SHIPPED | OUTPATIENT
Start: 2025-05-12

## 2025-05-12 RX ORDER — LOSARTAN POTASSIUM 25 MG/1
25 TABLET ORAL DAILY
Qty: 90 TABLET | Refills: 3 | Status: SHIPPED | OUTPATIENT
Start: 2025-05-12

## 2025-05-12 RX ORDER — TAMSULOSIN HYDROCHLORIDE 0.4 MG/1
0.4 CAPSULE ORAL DAILY
Qty: 90 CAPSULE | Refills: 3 | Status: SHIPPED | OUTPATIENT
Start: 2025-05-12

## 2025-05-12 NOTE — TELEPHONE ENCOUNTER
Refill Per Protocol     Requested Prescriptions   Pending Prescriptions Disp Refills    OMEPRAZOLE 20 MG Oral Capsule Delayed Release [Pharmacy Med Name: OMEPRAZOLE DR 20 MG CAPSULE] 90 capsule 3     Sig: TAKE 1 CAPSULE BY MOUTH EVERY DAY       Gastrointestional Medication Protocol Passed - 5/12/2025  8:14 AM        Passed - In person appointment or virtual visit in the past 12 mos or appointment in next 3 mos     Recent Outpatient Visits              2 weeks ago Age-related nuclear cataract of both eyes    [facility]    [facility] Visit    5 months ago [facility]igo [facility]ularis    [facility]    6 months ago Stage 3a chronic kidney disease (HCC)    [facility]    Office Visit    7 months ago Chronic bilateral low back pain without sciatica    [facility] Terrence Mcgregor DO    Office Visit    8 months ago History of COVID-19    [facility] Randy Pryor MD    Office Visit          Future Appointments         Provider Department Appt Notes    In 1 month Vanessa Costa MD [facility] follow up    In 2 months Dacia Oseguera MD [facility]                     Passed - Medication is active on med list          TAMSULOSIN 0.4 MG Oral Cap [Pharmacy Med Name: TAMSULOSIN HCL 0.4 MG CAPSULE] 90 capsule 3     Sig: TAKE 1 CAPSULE BY MOUTH EVERY DAY       Genitourinary Medications Passed - 5/12/2025  8:14 AM        Passed - Patient does not have pulmonary hypertension on problem list        Passed - In person appointment or virtual visit in the past 12 mos or appointment in next 3 mos     Recent Outpatient Visits              2 weeks ago Age-related nuclear  cataract of both eyes    [facility]    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    [facility]    8 months ago History of COVID-19    [facility] Randy Pryor MD    Office Visit          Future Appointments         Provider Department Appt Notes    In 1 month Vanessa Costa MD [facility] follow up    In 2 months Dacia Oseguera MD [facility]                     Passed - Medication is active on med list          LOSARTAN 25 MG Oral Tab [Pharmacy Med Name: LOSARTAN POTASSIUM 25 MG TAB] 90 tablet 3     Sig: Take 1 tablet (25 mg total) by mouth daily.       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    [facility]    Office Visit    6 months ago Stage 3a chronic kidney disease (HCC)    [facility] Dacia Patel [facility]    Office Visit    7  months ago Chronic bilateral low back pain without sciatica    [facility]    Office Visit    8 months ago History of COVID-19    [facility] Randy Pryor MD    Office Visit          Future Appointments         Provider Department Appt Notes    In 1 month Vanessa Costa MD [facility] follow up    In 2 months Dacia Oseguera MD [facility]                     Passed - EGFRCR or GFRNAA > 50     GFR Evaluation  EGFRCR: 53 , resulted on 5/7/2025          Passed - Medication is active on med list

## 2025-05-21 NOTE — PROGRESS NOTES
Progress note    C/C:   Chief Complaint   Patient presents with    Follow - Up     10/17/23 LOV. He completed PT for gait and balance. His daughter, rodolfo states that the PT helps while he is in PT but he does not do HEP so there is no improvement after completing PT. No pain. Denies t/n. Tramadol nightly.       HPI: 89-year-old male presents for follow-up.  Did well while in physical therapy, but follow-through with home exercise program has been difficult as the daughter has not been able to regularly instruct him to do his home exercise program.  The patient has significant Alzheimer's dementia and will is able to follow instructions but will not do the exercises unless prompted.  They do have caregiver services to come 2 to 3 days a week. Was seen by PCP who would like him to transition off the tramadol to gabapentin.     Pertinent allergies:   Allergies   Allergen Reactions    Pcn [Penicillins] NAUSEA AND VOMITING    Seasonal OTHER (SEE COMMENTS)     Watery eyes, coughing, sneezing          Physical exam:  Pulse 94   Wt 215 lb (97.5 kg)   SpO2 97%   BMI 35.78 kg/m²      Lumbar spine exam:    No pain with lumbar flexion. No pain with lumbar extension.  5/5 LE strength b/l  1/4 quad, gastrocs reflexes b/l  Seated SLR negative b/l    Imaging: no new imaging to review    Assessment and plan  Chronic LBP, bilatera lower extremity weakness  Abnormal gait/balance  H/o alzheimer's dementia    Though he has significant Alzheimer's disease he does follow directions well, and needs regular reminding to do his HEP. Daughter and I discussed ways to keep up with his HEP and exercise; even walking or using the stationary bike will be good for him, and perhaps the caregiver can also help with this. He will take gabapentin 200mg at bedtime x1 week, then 300gm at bedtime if no side effects and no benefit; stop tramadol and see if the gabapentin is sufficient enough.     Follow up in 4-6 months or earlier MARILEE.     Terrence  Continue Regimen: tacrolimus 0.1 % topical ointment Meche DO  Physical Medicine and Rehabilitation  [facility]   Detail Level: Zone Render In Strict Bullet Format?: No

## 2025-06-02 ENCOUNTER — PATIENT MESSAGE (OUTPATIENT)
Dept: INTERNAL MEDICINE CLINIC | Facility: CLINIC | Age: OVER 89
End: 2025-06-02

## 2025-06-02 NOTE — TELEPHONE ENCOUNTER
Dr. Oseguera - patient reported vitals have been entered into Epic, please advise    Daughter Nereida (on WILIAN) called, patient's full name and date of birth verified.  Nereida calling to confirm we have received the blood pressures she sent for Dr. Oseguera's review.     A blood pressure medication was recently stopped, patient has some higher numbers again.     RN informed Nereida we will call back with Dr. Oseguera's recommendations.

## 2025-06-10 DIAGNOSIS — M54.50 CHRONIC BILATERAL LOW BACK PAIN WITHOUT SCIATICA: ICD-10-CM

## 2025-06-10 DIAGNOSIS — G89.29 CHRONIC BILATERAL LOW BACK PAIN WITHOUT SCIATICA: ICD-10-CM

## 2025-06-11 RX ORDER — TRAMADOL HYDROCHLORIDE 50 MG/1
75 TABLET ORAL NIGHTLY PRN
Qty: 45 TABLET | Refills: 0 | Status: SHIPPED | OUTPATIENT
Start: 2025-06-11

## 2025-06-11 NOTE — TELEPHONE ENCOUNTER
Refill Request    Medication request: traMADol 50 MG Oral Tab Take 1.5 tablets (75 mg total) by mouth nightly as needed for Pain.     LOV:10/3/2024 Terrence Mcgregor DO   Due back to clinic per last office note:  \"F/u in 4-6 weeks. \"   NOV: Visit date not found      [place] ref[place]: 5/11/25 #45- 30 day supply    Urine drug screen (if applicable): n/a  Pain contract: n/a    LOV plan (if weaning or changing medications): Per  at last office visit: \"May increase tramadol to 75mg at bedtime. \"

## 2025-07-10 DIAGNOSIS — G89.29 CHRONIC BILATERAL LOW BACK PAIN WITHOUT SCIATICA: ICD-10-CM

## 2025-07-10 DIAGNOSIS — M54.50 CHRONIC BILATERAL LOW BACK PAIN WITHOUT SCIATICA: ICD-10-CM

## 2025-07-10 RX ORDER — TRAMADOL HYDROCHLORIDE 50 MG/1
75 TABLET ORAL NIGHTLY PRN
Qty: 45 TABLET | Refills: 0 | Status: SHIPPED | OUTPATIENT
Start: 2025-07-11

## 2025-07-29 ENCOUNTER — OFFICE VISIT (OUTPATIENT)
Dept: INTERNAL MEDICINE CLINIC | Facility: CLINIC | Age: OVER 89
End: 2025-07-29

## 2025-07-29 VITALS
OXYGEN SATURATION: 96 % | TEMPERATURE: 98 F | HEART RATE: 57 BPM | BODY MASS INDEX: 31.82 KG/M2 | RESPIRATION RATE: 17 BRPM | DIASTOLIC BLOOD PRESSURE: 68 MMHG | HEIGHT: 65 IN | WEIGHT: 191 LBS | SYSTOLIC BLOOD PRESSURE: 126 MMHG

## 2025-07-29 DIAGNOSIS — N18.31 STAGE 3A CHRONIC KIDNEY DISEASE (HCC): Primary | ICD-10-CM

## 2025-07-29 DIAGNOSIS — E03.9 ACQUIRED HYPOTHYROIDISM: ICD-10-CM

## 2025-07-29 DIAGNOSIS — R25.2 LEG CRAMPS: ICD-10-CM

## 2025-07-29 DIAGNOSIS — R63.4 WEIGHT LOSS: ICD-10-CM

## 2025-07-29 DIAGNOSIS — E55.9 VITAMIN D DEFICIENCY: ICD-10-CM

## 2025-07-29 DIAGNOSIS — E53.8 VITAMIN B12 DEFICIENCY: ICD-10-CM

## 2025-07-29 DIAGNOSIS — Z71.85 VACCINE COUNSELING: ICD-10-CM

## 2025-07-29 DIAGNOSIS — I10 ESSENTIAL HYPERTENSION, BENIGN: ICD-10-CM

## 2025-07-29 LAB
ALBUMIN SERPL-MCNC: 4.1 G/DL (ref 3.2–4.8)
ALBUMIN/GLOB SERPL: 1.5 (ref 1–2)
ALP LIVER SERPL-CCNC: 65 U/L (ref 45–117)
ALT SERPL-CCNC: 30 U/L (ref 10–49)
ANION GAP SERPL CALC-SCNC: 6 MMOL/L (ref 0–18)
APPEARANCE: CLEAR
AST SERPL-CCNC: 21 U/L (ref ?–34)
BASOPHILS # BLD AUTO: 0.02 X10(3) UL (ref 0–0.2)
BASOPHILS NFR BLD AUTO: 0.2 %
BILIRUB SERPL-MCNC: 0.4 MG/DL (ref 0.2–0.9)
BILIRUBIN: NEGATIVE
BUN BLD-MCNC: 14 MG/DL (ref 9–23)
BUN/CREAT SERPL: 10.5 (ref 10–20)
CALCIUM BLD-MCNC: 8.9 MG/DL (ref 8.7–10.4)
CHLORIDE SERPL-SCNC: 106 MMOL/L (ref 98–112)
CO2 SERPL-SCNC: 29 MMOL/L (ref 21–32)
CREAT BLD-MCNC: 1.33 MG/DL (ref 0.7–1.3)
DEPRECATED RDW RBC AUTO: 45.7 FL (ref 35.1–46.3)
EGFRCR SERPLBLD CKD-EPI 2021: 50 ML/MIN/1.73M2 (ref 60–?)
EOSINOPHIL # BLD AUTO: 0.57 X10(3) UL (ref 0–0.7)
EOSINOPHIL NFR BLD AUTO: 7.1 %
ERYTHROCYTE [DISTWIDTH] IN BLOOD BY AUTOMATED COUNT: 13.5 % (ref 11–15)
FASTING STATUS PATIENT QL REPORTED: NO
GLOBULIN PLAS-MCNC: 2.7 G/DL (ref 2–3.5)
GLUCOSE (URINE DIPSTICK): NEGATIVE MG/DL
GLUCOSE BLD-MCNC: 98 MG/DL (ref 70–99)
HCT VFR BLD AUTO: 45.6 % (ref 39–53)
HGB BLD-MCNC: 14.4 G/DL (ref 13–17.5)
IMM GRANULOCYTES # BLD AUTO: 0.02 X10(3) UL (ref 0–1)
IMM GRANULOCYTES NFR BLD: 0.2 %
KETONES (URINE DIPSTICK): NEGATIVE MG/DL
LEUKOCYTES: NEGATIVE
LYMPHOCYTES # BLD AUTO: 2.18 X10(3) UL (ref 1–4)
LYMPHOCYTES NFR BLD AUTO: 27.2 %
MAGNESIUM SERPL-MCNC: 2.3 MG/DL (ref 1.6–2.6)
MCH RBC QN AUTO: 29.5 PG (ref 26–34)
MCHC RBC AUTO-ENTMCNC: 31.6 G/DL (ref 31–37)
MCV RBC AUTO: 93.4 FL (ref 80–100)
MONOCYTES # BLD AUTO: 0.86 X10(3) UL (ref 0.1–1)
MONOCYTES NFR BLD AUTO: 10.7 %
MULTISTIX LOT#: NORMAL NUMERIC
NEUTROPHILS # BLD AUTO: 4.36 X10 (3) UL (ref 1.5–7.7)
NEUTROPHILS # BLD AUTO: 4.36 X10(3) UL (ref 1.5–7.7)
NEUTROPHILS NFR BLD AUTO: 54.6 %
NITRITE, URINE: NEGATIVE
OCCULT BLOOD: NEGATIVE
OSMOLALITY SERPL CALC.SUM OF ELEC: 292 MOSM/KG (ref 275–295)
PH, URINE: 7 (ref 4.5–8)
PLATELET # BLD AUTO: 147 10(3)UL (ref 150–450)
POTASSIUM SERPL-SCNC: 4.1 MMOL/L (ref 3.5–5.1)
PROT SERPL-MCNC: 6.8 G/DL (ref 5.7–8.2)
PROTEIN (URINE DIPSTICK): NEGATIVE MG/DL
RBC # BLD AUTO: 4.88 X10(6)UL (ref 3.8–5.8)
SODIUM SERPL-SCNC: 141 MMOL/L (ref 136–145)
SPECIFIC GRAVITY: 1.01 (ref 1–1.03)
TSI SER-ACNC: 2.64 UIU/ML (ref 0.55–4.78)
URINE-COLOR: YELLOW
UROBILINOGEN,SEMI-QN: 1 MG/DL (ref 0–1.9)
VIT B12 SERPL-MCNC: 445 PG/ML (ref 211–911)
VIT D+METAB SERPL-MCNC: 45 NG/ML (ref 30–100)
WBC # BLD AUTO: 8 X10(3) UL (ref 4–11)

## 2025-07-29 PROCEDURE — 3008F BODY MASS INDEX DOCD: CPT | Performed by: INTERNAL MEDICINE

## 2025-07-29 PROCEDURE — 81003 URINALYSIS AUTO W/O SCOPE: CPT | Performed by: INTERNAL MEDICINE

## 2025-07-29 PROCEDURE — 3078F DIAST BP <80 MM HG: CPT | Performed by: INTERNAL MEDICINE

## 2025-07-29 PROCEDURE — 1159F MED LIST DOCD IN RCRD: CPT | Performed by: INTERNAL MEDICINE

## 2025-07-29 PROCEDURE — 3074F SYST BP LT 130 MM HG: CPT | Performed by: INTERNAL MEDICINE

## 2025-07-29 PROCEDURE — 99215 OFFICE O/P EST HI 40 MIN: CPT | Performed by: INTERNAL MEDICINE

## 2025-07-29 PROCEDURE — 1160F RVW MEDS BY RX/DR IN RCRD: CPT | Performed by: INTERNAL MEDICINE

## 2025-07-29 RX ORDER — MIRTAZAPINE 15 MG/1
15 TABLET, FILM COATED ORAL NIGHTLY
Qty: 30 TABLET | Refills: 1 | Status: SHIPPED | OUTPATIENT
Start: 2025-07-29

## 2025-08-02 DIAGNOSIS — R41.3 MEMORY LOSS: ICD-10-CM

## 2025-08-02 DIAGNOSIS — F03.90 DEMENTIA (HCC): ICD-10-CM

## 2025-08-02 DIAGNOSIS — E03.9 HYPOTHYROIDISM: ICD-10-CM

## 2025-08-05 ENCOUNTER — TELEPHONE (OUTPATIENT)
Dept: INTERNAL MEDICINE CLINIC | Facility: CLINIC | Age: OVER 89
End: 2025-08-05

## 2025-08-05 DIAGNOSIS — F02.83: Primary | ICD-10-CM

## 2025-08-05 DIAGNOSIS — R41.3 MEMORY LOSS: ICD-10-CM

## 2025-08-05 RX ORDER — SIMVASTATIN 20 MG
20 TABLET ORAL NIGHTLY
Qty: 90 TABLET | Refills: 3 | Status: SHIPPED | OUTPATIENT
Start: 2025-08-05

## 2025-08-08 DIAGNOSIS — M54.50 CHRONIC BILATERAL LOW BACK PAIN WITHOUT SCIATICA: ICD-10-CM

## 2025-08-08 DIAGNOSIS — G89.29 CHRONIC BILATERAL LOW BACK PAIN WITHOUT SCIATICA: ICD-10-CM

## 2025-08-11 RX ORDER — TRAMADOL HYDROCHLORIDE 50 MG/1
75 TABLET ORAL NIGHTLY PRN
Qty: 45 TABLET | Refills: 0 | Status: SHIPPED | OUTPATIENT
Start: 2025-08-11

## 2025-08-18 RX ORDER — GABAPENTIN 100 MG/1
300 CAPSULE ORAL NIGHTLY
Qty: 270 CAPSULE | Refills: 3 | Status: SHIPPED | OUTPATIENT
Start: 2025-08-18

## 2025-08-26 RX ORDER — MIRTAZAPINE 15 MG/1
15 TABLET, FILM COATED ORAL NIGHTLY
Qty: 30 TABLET | Refills: 1 | Status: SHIPPED | OUTPATIENT
Start: 2025-08-26

## (undated) DIAGNOSIS — M54.50 CHRONIC BILATERAL LOW BACK PAIN WITHOUT SCIATICA: ICD-10-CM

## (undated) DIAGNOSIS — G89.29 CHRONIC BILATERAL LOW BACK PAIN WITHOUT SCIATICA: ICD-10-CM

## (undated) DIAGNOSIS — M54.50 CHRONIC BILATERAL LOW BACK PAIN WITHOUT SCIATICA: Primary | ICD-10-CM

## (undated) DIAGNOSIS — G89.29 CHRONIC BILATERAL LOW BACK PAIN WITHOUT SCIATICA: Primary | ICD-10-CM

## (undated) DIAGNOSIS — M21.372 ACQUIRED LEFT FOOT DROP: ICD-10-CM

## (undated) NOTE — LETTER
[address], [facility]   Date:   5/31/2022     Name:   Gilmer Galeas    YOB: 1931   MRN:   [id number]       WHERE IS YOUR PAIN NOW? Erika the areas on your body where you feel the described sensations. Use the appropriate symbol. [place] the areas of radiation. Include all affected areas. Just to complete the picture, please draw in the face. ACHE:  ^ ^ ^   NUMBNESS:  0000   PINS & NEEDLES:  = = = =                              ^ ^ ^                       0000              = = = =                                    ^ ^ ^                       0000            = = = =      BURNING:  XXXX   STABBING: ////                  XXXX                ////                         XXXX          ////     Please erika the line below indicating your degree of pain right now  with 0 being no pain 10 being the worst pain possible.                                          0             1             2              3             4              5              6              7             8             9             10         Patient Signature:

## (undated) NOTE — LETTER
[address], [facility]   Date:   2/22/2022     Name:   Dena Mariscal    YOB: 1931   MRN:   [id number]       [facility]? Erika the areas on your body where you feel the described sensations. Use the appropriate symbol. Stephanie Show[place] the areas of radiation. Include all affected areas. Just to complete the picture, please draw in the face. ACHE:  ^ ^ ^   NUMBNESS:  0000   PINS & NEEDLES:  = = = =                              ^ ^ ^                       0000              = = = =                                    ^ ^ ^                       0000            = = = =      BURNING:  XXXX   STABBING: ////                  XXXX                ////                         XXXX          ////     Please erika the line below indicating your degree of pain right now  with 0 being no pain 10 being the worst pain possible.                                          0             1             2              3             4              5              6              7             8             9             10         Patient Signature:

## (undated) NOTE — LETTER
7/22/2021              Srinivasan Robertson        [phone] [phone]         To whom it may concern,    Srinivasan Robertson is currently a patient under my medical care.   The patient's medical condition makes serving on jury duty inadv

## (undated) NOTE — LETTER
[facility] [address] [place], [facility]   Date:   10/17/2023     Name:   Gilmer Galeas    YOB: 1931   MRN:   [id number]       [facility]? Erika the areas on your body where you feel the described sensations. Use the appropriate symbol. [place] the areas of radiation. Include all affected areas. Just to complete the picture, please draw in the face. ACHE:  ^ ^ ^   NUMBNESS:  0000   PINS & NEEDLES:  = = = =                              ^ ^ ^                       0000              = = = =                                    ^ ^ ^                       0000            = = = =      BURNING:  XXXX   STABBING: ////                  XXXX                ////                         XXXX          ////     Please erika the line below indicating your degree of pain right now  with 0 being no pain 10 being the worst pain possible.                                          0             1             2              3             4              5              6              7             8             9             10         Patient Signature:

## (undated) NOTE — LETTER
[facility] [address] , [place]   Date:   4/18/2023     Name:   Chadd Ervin    YOB: 1931   MRN:   [id number]       [facility]? Erika the areas on your body where you feel the described sensations. Use the appropriate symbol. Srinivasan [place] the areas of radiation. Include all affected areas. Just to complete the picture, please draw in the face. ACHE:  ^ ^ ^   NUMBNESS:  0000   PINS & NEEDLES:  = = = =                              ^ ^ ^                       0000              = = = =                                    ^ ^ ^                       0000            = = = =      BURNING:  XXXX   STABBING: ////                  XXXX                ////                         XXXX          ////     Please erika the line below indicating your degree of pain right now  with 0 being no pain 10 being the worst pain possible.                                          0             1             2              3             4              5              6              7             8             9             10         Patient Signature:

## (undated) NOTE — LETTER
[address], [facility]   Date:   8/9/2022     Name:   Soumya Pak    YOB: 1931   MRN:   [id number]       WHERE IS YOUR PAIN NOW? Erika the areas on your body where you feel the described sensations. Use the appropriate symbol. Nazario Evin the areas of radiation. Include all affected areas. Just to complete the picture, please draw in the face. ACHE:  ^ ^ ^   NUMBNESS:  0000   PINS & NEEDLES:  = = = =                              ^ ^ ^                       0000              = = = =                                    ^ ^ ^                       0000            = = = =      BURNING:  XXXX   STABBING: ////                  XXXX                ////                         XXXX          ////     Please erika the line below indicating your degree of pain right now  with 0 being no pain 10 being the worst pain possible.                                          0             1             2              3             4              5              6              7             8             9             10         Patient Signature:

## (undated) NOTE — LETTER
[facility] [address] , [place]   Date:   3/1/2023     Name:   Tomas Butt    YOB: 1931   MRN:   [id number]       WHERE IS YOUR PAIN NOW? Erika the areas on your body where you feel the described sensations. Use the appropriate symbol. Jr [place] the areas of radiation. Include all affected areas. Just to complete the picture, please draw in the face. ACHE:  ^ ^ ^   NUMBNESS:  0000   PINS & NEEDLES:  = = = =                              ^ ^ ^                       0000              = = = =                                    ^ ^ ^                       0000            = = = =      BURNING:  XXXX   STABBING: ////                  XXXX                ////                         XXXX          ////     Please erika the line below indicating your degree of pain right now  with 0 being no pain 10 being the worst pain possible.                                          0             1             2              3             4              5              6              7             8             9             10         Patient Signature:

## (undated) NOTE — LETTER
[address], [address] [facility]   [facility], [address] [address]  [phone]                [address]  :  3/3/1931        To Whom It May Concern:     This patient was seen in our office on

## (undated) NOTE — LETTER
WHERE IS YOUR PAIN NOW?  Erika the areas on your body where you feel the described sensations.  Use the appropriate symbol.  Erika the areas of radiation.  Include all affected areas.  Just to complete the picture, please draw in the face.     ACHE:  ^ ^ ^   NUMBNESS:  0000   PINS & NEEDLES:  = = = =                              ^ ^ ^                       0000              = = = =                                    ^ ^ ^                       0000            = = = =      BURNING:  XXXX   STABBING: ////                  XXXX                ////                         XXXX          ////     Please erika the line below indicating your degree of pain right now  with 0 being no pain 10 being the worst pain possible.                                         0             1             2              3             4              5              6              7             8             9             10         Patient Signature:

## (undated) NOTE — LETTER
Patient Name: Soo Ordonez  YOB: 1931          MRN number:  [id number]  Date:  8/6/2021  Referring Physician: Bright Galvan.  Oumar    ADULT VIDEOFLUOROSCOPIC SWALLOWING STUDY:    Referring Physician: Janki Hanna      Radiologist: Dr. Lauren Thao DYSPHAGIA ASSESSMENT  Test completed in conjunction with Radiologist.   Food/Liquid Types Presented: puree, solid and thin liquids. Study Position and View:  Patient was seated upright and viewed laterally.     Pain Assessment: The patient reports pain a Swallowing, 5  PLAN   Potential: Good    Diet Recommendations:  Solids: Minced and moist  Liquids:  Thin    Recommended compensatory strategies:   Sit upright  Small sips  Small bites  Eat slowly  Alternate liquids and solids    Medication Administration:

## (undated) NOTE — Clinical Note
Dr. Gee Fabian, I saw Hailey Patton for a Palliative Care consult. Please see attached note. I look forward to working with Hailey Patton and assisting him with any Palliative Care needs he may have. Thank you for the referral!Esha

## (undated) NOTE — LETTER
Patient Name: [address]  YOB: 1931          MRN number:  [id number]  Date:  8/6/2018  Referring Physician: Ron Varela     ADULT SWALLOWING EVALUATION:    Referring Physician: Dr. Laurie Montero  Oropharyngeal Dysphagia  Date of Service: 8/6/2 increased oral transit time, delayed swallow response, and reduced hyolaryngeal elevation/excursion via palpation.  Pt demonstrates overt CSA (e.g., Reflexive throat clear and increased respiratory effort ) across trials of soft solids and thin liquids via Patient and his daughter were advised of these findings, precautions, and treatment options and has agreed to actively participate in planning and for this course of care.     Thank you for your referral. Please co-sign or sign and return this letter via fa

## (undated) NOTE — LETTER
ADULT VIDEOFLUOROSCOPIC SWALLOWING STUDY    Referring Physician: Dr. Judy Concepcion  Diagnosis: Oropharyngeal Dysphagia    Date of Service: 8/23/2018   Radiologist: Dr. James Dhaliwal     Dear Dr. Judy Concepcion,    This letter is to inform you of Saadia Dasilvaing Videofluoroscopic S Oral phase:  Impaired. Pt demonstrates tongue pumping and increased A-P transit. Regular solids were attempted under fluoro; however, pt with no bite reflex and only able to scrape the barium off the cracker presented. Pharyngeal phase:  Impaired.  Pt Alternate liquids and solids  Swallow twice with each bite  No straw    Medication Administration:  Take pills one at a time    Further Follow-up:  No further follow up required     EDUCATION/INSTRUCTION  Reviewed results and recommendations with patient a

## (undated) NOTE — LETTER
[facility] [place]   Date:   2/22/2024     Name:   Audie Houston    YOB: 1931   MRN:   [id number]       WHERE IS YOUR PAIN NOW?  Erika the areas on your body where you feel the described sensations.  Use the appropriate symbol.  Erika the areas of radiation.  Include all affected areas.  Just to complete the picture, please draw in the face.     ACHE:  ^ ^ ^   NUMBNESS:  0000   PINS & NEEDLES:  = = = =                              ^ ^ ^                       0000              = = = =                                    ^ ^ ^                       0000            = = = =      BURNING:  XXXX   STABBING: ////                  XXXX                ////                         XXXX          ////     Please erika the line below indicating your degree of pain right now  with 0 being no pain 10 being the worst pain possible.                                         0             1             2              3             4              5              6              7             8             9             10         Patient Signature:

## (undated) NOTE — LETTER
[address], [facility]   Date:   11/22/2022     Name:   Jose Villarreal    YOB: 1931   MRN:   [id number]       [facility]? Erika the areas on your body where you feel the described sensations. Use the appropriate symbol. Wilfredo Shin the areas of radiation. Include all affected areas. Just to complete the picture, please draw in the face. ACHE:  ^ ^ ^   NUMBNESS:  0000   PINS & NEEDLES:  = = = =                              ^ ^ ^                       0000              = = = =                                    ^ ^ ^                       0000            = = = =      BURNING:  XXXX   STABBING: ////                  XXXX                ////                         XXXX          ////     Please erika the line below indicating your degree of pain right now  with 0 being no pain 10 being the worst pain possible.                                          0             1             2              3             4              5              6              7             8             9             10         Patient Signature:

## (undated) NOTE — Clinical Note
Dr. Nan Judd asked me to see Johnathan Steele for a Palliative Care consult. Please see attached note. I look forward to working with Johnathan Steele and assisting him with any Palliative Care needs he may have. Have a good day,Charlene Hooperp